# Patient Record
Sex: FEMALE | Race: WHITE | NOT HISPANIC OR LATINO | URBAN - METROPOLITAN AREA
[De-identification: names, ages, dates, MRNs, and addresses within clinical notes are randomized per-mention and may not be internally consistent; named-entity substitution may affect disease eponyms.]

---

## 2017-10-01 ENCOUNTER — INPATIENT (INPATIENT)
Facility: HOSPITAL | Age: 82
LOS: 10 days | Discharge: NURSING, REHAB OR LONG-TERM CARE | DRG: 291 | End: 2017-10-12
Attending: INTERNAL MEDICINE | Admitting: INTERNAL MEDICINE
Payer: MEDICARE

## 2017-10-01 VITALS
DIASTOLIC BLOOD PRESSURE: 69 MMHG | WEIGHT: 100.09 LBS | RESPIRATION RATE: 23 BRPM | SYSTOLIC BLOOD PRESSURE: 166 MMHG | HEIGHT: 60 IN | HEART RATE: 58 BPM | TEMPERATURE: 98 F | OXYGEN SATURATION: 95 %

## 2017-10-01 DIAGNOSIS — J90 PLEURAL EFFUSION, NOT ELSEWHERE CLASSIFIED: ICD-10-CM

## 2017-10-01 DIAGNOSIS — K21.9 GASTRO-ESOPHAGEAL REFLUX DISEASE WITHOUT ESOPHAGITIS: ICD-10-CM

## 2017-10-01 DIAGNOSIS — I35.0 NONRHEUMATIC AORTIC (VALVE) STENOSIS: ICD-10-CM

## 2017-10-01 DIAGNOSIS — F03.91 UNSPECIFIED DEMENTIA WITH BEHAVIORAL DISTURBANCE: ICD-10-CM

## 2017-10-01 DIAGNOSIS — E03.9 HYPOTHYROIDISM, UNSPECIFIED: ICD-10-CM

## 2017-10-01 DIAGNOSIS — I25.10 ATHEROSCLEROTIC HEART DISEASE OF NATIVE CORONARY ARTERY WITHOUT ANGINA PECTORIS: ICD-10-CM

## 2017-10-01 DIAGNOSIS — R06.00 DYSPNEA, UNSPECIFIED: ICD-10-CM

## 2017-10-01 DIAGNOSIS — J44.9 CHRONIC OBSTRUCTIVE PULMONARY DISEASE, UNSPECIFIED: ICD-10-CM

## 2017-10-01 DIAGNOSIS — S72.002A FRACTURE OF UNSPECIFIED PART OF NECK OF LEFT FEMUR, INITIAL ENCOUNTER FOR CLOSED FRACTURE: Chronic | ICD-10-CM

## 2017-10-01 DIAGNOSIS — I50.9 HEART FAILURE, UNSPECIFIED: ICD-10-CM

## 2017-10-01 DIAGNOSIS — R13.10 DYSPHAGIA, UNSPECIFIED: ICD-10-CM

## 2017-10-01 LAB
ALBUMIN SERPL ELPH-MCNC: 3.4 G/DL — SIGNIFICANT CHANGE UP (ref 3.3–5)
ALP SERPL-CCNC: 89 U/L — SIGNIFICANT CHANGE UP (ref 30–120)
ALT FLD-CCNC: 25 U/L DA — SIGNIFICANT CHANGE UP (ref 10–60)
ANION GAP SERPL CALC-SCNC: 10 MMOL/L — SIGNIFICANT CHANGE UP (ref 5–17)
APPEARANCE UR: CLEAR — SIGNIFICANT CHANGE UP
APTT BLD: 31 SEC — SIGNIFICANT CHANGE UP (ref 27.5–37.4)
AST SERPL-CCNC: 40 U/L — SIGNIFICANT CHANGE UP (ref 10–40)
BACTERIA # UR AUTO: ABNORMAL
BILIRUB SERPL-MCNC: 1.4 MG/DL — HIGH (ref 0.2–1.2)
BILIRUB UR-MCNC: NEGATIVE — SIGNIFICANT CHANGE UP
BUN SERPL-MCNC: 26 MG/DL — HIGH (ref 7–23)
CALCIUM SERPL-MCNC: 8.8 MG/DL — SIGNIFICANT CHANGE UP (ref 8.4–10.5)
CHLORIDE SERPL-SCNC: 105 MMOL/L — SIGNIFICANT CHANGE UP (ref 96–108)
CK MB CFR SERPL CALC: 3.8 NG/ML — HIGH (ref 0–3.6)
CO2 SERPL-SCNC: 24 MMOL/L — SIGNIFICANT CHANGE UP (ref 22–31)
COLOR SPEC: YELLOW — SIGNIFICANT CHANGE UP
CREAT SERPL-MCNC: 1.33 MG/DL — HIGH (ref 0.5–1.3)
DIFF PNL FLD: ABNORMAL
EOSINOPHIL NFR BLD AUTO: 1 % — SIGNIFICANT CHANGE UP (ref 0–6)
EPI CELLS # UR: SIGNIFICANT CHANGE UP
GIANT PLATELETS BLD QL SMEAR: PRESENT — SIGNIFICANT CHANGE UP
GLUCOSE SERPL-MCNC: 94 MG/DL — SIGNIFICANT CHANGE UP (ref 70–99)
GLUCOSE UR QL: NEGATIVE MG/DL — SIGNIFICANT CHANGE UP
HCT VFR BLD CALC: 40.5 % — SIGNIFICANT CHANGE UP (ref 34.5–45)
HGB BLD-MCNC: 12.6 G/DL — SIGNIFICANT CHANGE UP (ref 11.5–15.5)
INR BLD: 1.01 RATIO — SIGNIFICANT CHANGE UP (ref 0.88–1.16)
KETONES UR-MCNC: NEGATIVE — SIGNIFICANT CHANGE UP
LACTATE SERPL-SCNC: 2 MMOL/L — SIGNIFICANT CHANGE UP (ref 0.7–2)
LEUKOCYTE ESTERASE UR-ACNC: ABNORMAL
LYMPHOCYTES # BLD AUTO: 37 % — SIGNIFICANT CHANGE UP (ref 13–44)
MCHC RBC-ENTMCNC: 29.1 PG — SIGNIFICANT CHANGE UP (ref 27–34)
MCHC RBC-ENTMCNC: 31.1 GM/DL — LOW (ref 32–36)
MCV RBC AUTO: 93.4 FL — SIGNIFICANT CHANGE UP (ref 80–100)
MONOCYTES NFR BLD AUTO: 7 % — SIGNIFICANT CHANGE UP (ref 2–14)
NEUTROPHILS NFR BLD AUTO: 47 % — SIGNIFICANT CHANGE UP (ref 43–77)
NEUTS BAND # BLD: 5 % — SIGNIFICANT CHANGE UP (ref 0–8)
NITRITE UR-MCNC: NEGATIVE — SIGNIFICANT CHANGE UP
NT-PROBNP SERPL-SCNC: HIGH PG/ML (ref 0–450)
PH UR: 7 — SIGNIFICANT CHANGE UP (ref 5–8)
PLAT MORPH BLD: NORMAL — SIGNIFICANT CHANGE UP
PLATELET # BLD AUTO: 122 K/UL — LOW (ref 150–400)
POTASSIUM SERPL-MCNC: 5 MMOL/L — SIGNIFICANT CHANGE UP (ref 3.5–5.3)
POTASSIUM SERPL-SCNC: 5 MMOL/L — SIGNIFICANT CHANGE UP (ref 3.5–5.3)
PROT SERPL-MCNC: 6 G/DL — SIGNIFICANT CHANGE UP (ref 6–8.3)
PROT UR-MCNC: 30 MG/DL
PROTHROM AB SERPL-ACNC: 11 SEC — SIGNIFICANT CHANGE UP (ref 9.8–12.7)
RBC # BLD: 4.34 M/UL — SIGNIFICANT CHANGE UP (ref 3.8–5.2)
RBC # FLD: 15 % — HIGH (ref 10.3–14.5)
RBC BLD AUTO: NORMAL — SIGNIFICANT CHANGE UP
RBC CASTS # UR COMP ASSIST: ABNORMAL /HPF (ref 0–4)
SODIUM SERPL-SCNC: 139 MMOL/L — SIGNIFICANT CHANGE UP (ref 135–145)
SP GR SPEC: 1.01 — SIGNIFICANT CHANGE UP (ref 1.01–1.02)
TROPONIN I SERPL-MCNC: 0.04 NG/ML — SIGNIFICANT CHANGE UP (ref 0.02–0.06)
TSH SERPL-MCNC: 10.12 UIU/ML — HIGH (ref 0.27–4.2)
UROBILINOGEN FLD QL: NEGATIVE MG/DL — SIGNIFICANT CHANGE UP
VARIANT LYMPHS # BLD: 3 % — SIGNIFICANT CHANGE UP (ref 0–6)
WBC # BLD: 13 K/UL — HIGH (ref 3.8–10.5)
WBC # FLD AUTO: 13 K/UL — HIGH (ref 3.8–10.5)
WBC UR QL: ABNORMAL

## 2017-10-01 PROCEDURE — 99285 EMERGENCY DEPT VISIT HI MDM: CPT

## 2017-10-01 PROCEDURE — 71010: CPT | Mod: 26

## 2017-10-01 PROCEDURE — 76775 US EXAM ABDO BACK WALL LIM: CPT | Mod: 26

## 2017-10-01 PROCEDURE — 93010 ELECTROCARDIOGRAM REPORT: CPT

## 2017-10-01 RX ORDER — ALBUTEROL 90 UG/1
2 AEROSOL, METERED ORAL EVERY 6 HOURS
Qty: 0 | Refills: 0 | Status: DISCONTINUED | OUTPATIENT
Start: 2017-10-01 | End: 2017-10-03

## 2017-10-01 RX ORDER — LEVOTHYROXINE SODIUM 125 MCG
75 TABLET ORAL DAILY
Qty: 0 | Refills: 0 | Status: DISCONTINUED | OUTPATIENT
Start: 2017-10-01 | End: 2017-10-10

## 2017-10-01 RX ORDER — TIOTROPIUM BROMIDE 18 UG/1
1 CAPSULE ORAL; RESPIRATORY (INHALATION) DAILY
Qty: 0 | Refills: 0 | Status: DISCONTINUED | OUTPATIENT
Start: 2017-10-01 | End: 2017-10-03

## 2017-10-01 RX ORDER — FUROSEMIDE 40 MG
40 TABLET ORAL DAILY
Qty: 0 | Refills: 0 | Status: DISCONTINUED | OUTPATIENT
Start: 2017-10-02 | End: 2017-10-03

## 2017-10-01 RX ORDER — FLUTICASONE PROPIONATE 50 MCG
1 SPRAY, SUSPENSION NASAL DAILY
Qty: 0 | Refills: 0 | Status: DISCONTINUED | OUTPATIENT
Start: 2017-10-01 | End: 2017-10-12

## 2017-10-01 RX ORDER — FUROSEMIDE 40 MG
20 TABLET ORAL DAILY
Qty: 0 | Refills: 0 | Status: DISCONTINUED | OUTPATIENT
Start: 2017-10-01 | End: 2017-10-01

## 2017-10-01 RX ORDER — POTASSIUM CHLORIDE 20 MEQ
10 PACKET (EA) ORAL DAILY
Qty: 0 | Refills: 0 | Status: DISCONTINUED | OUTPATIENT
Start: 2017-10-01 | End: 2017-10-10

## 2017-10-01 RX ORDER — CARVEDILOL PHOSPHATE 80 MG/1
3.12 CAPSULE, EXTENDED RELEASE ORAL EVERY 12 HOURS
Qty: 0 | Refills: 0 | Status: DISCONTINUED | OUTPATIENT
Start: 2017-10-01 | End: 2017-10-12

## 2017-10-01 RX ORDER — ISOSORBIDE DINITRATE 5 MG/1
30 TABLET ORAL DAILY
Qty: 0 | Refills: 0 | Status: DISCONTINUED | OUTPATIENT
Start: 2017-10-01 | End: 2017-10-12

## 2017-10-01 RX ORDER — BUPROPION HYDROCHLORIDE 150 MG/1
150 TABLET, EXTENDED RELEASE ORAL DAILY
Qty: 0 | Refills: 0 | Status: DISCONTINUED | OUTPATIENT
Start: 2017-10-01 | End: 2017-10-12

## 2017-10-01 RX ORDER — IPRATROPIUM/ALBUTEROL SULFATE 18-103MCG
3 AEROSOL WITH ADAPTER (GRAM) INHALATION ONCE
Qty: 0 | Refills: 0 | Status: COMPLETED | OUTPATIENT
Start: 2017-10-01 | End: 2017-10-01

## 2017-10-01 RX ORDER — CARBIDOPA AND LEVODOPA 25; 100 MG/1; MG/1
1 TABLET ORAL DAILY
Qty: 0 | Refills: 0 | Status: DISCONTINUED | OUTPATIENT
Start: 2017-10-01 | End: 2017-10-12

## 2017-10-01 RX ORDER — PANTOPRAZOLE SODIUM 20 MG/1
40 TABLET, DELAYED RELEASE ORAL
Qty: 0 | Refills: 0 | Status: DISCONTINUED | OUTPATIENT
Start: 2017-10-01 | End: 2017-10-12

## 2017-10-01 RX ORDER — BUDESONIDE AND FORMOTEROL FUMARATE DIHYDRATE 160; 4.5 UG/1; UG/1
2 AEROSOL RESPIRATORY (INHALATION)
Qty: 0 | Refills: 0 | Status: DISCONTINUED | OUTPATIENT
Start: 2017-10-01 | End: 2017-10-03

## 2017-10-01 RX ORDER — INFLUENZA VIRUS VACCINE 15; 15; 15; 15 UG/.5ML; UG/.5ML; UG/.5ML; UG/.5ML
0.5 SUSPENSION INTRAMUSCULAR ONCE
Qty: 0 | Refills: 0 | Status: COMPLETED | OUTPATIENT
Start: 2017-10-01 | End: 2017-10-09

## 2017-10-01 RX ORDER — FUROSEMIDE 40 MG
20 TABLET ORAL ONCE
Qty: 0 | Refills: 0 | Status: COMPLETED | OUTPATIENT
Start: 2017-10-01 | End: 2017-10-01

## 2017-10-01 RX ADMIN — Medication 10 MILLIEQUIVALENT(S): at 12:51

## 2017-10-01 RX ADMIN — TIOTROPIUM BROMIDE 1 CAPSULE(S): 18 CAPSULE ORAL; RESPIRATORY (INHALATION) at 18:22

## 2017-10-01 RX ADMIN — PANTOPRAZOLE SODIUM 40 MILLIGRAM(S): 20 TABLET, DELAYED RELEASE ORAL at 10:51

## 2017-10-01 RX ADMIN — Medication 1 TABLET(S): at 12:27

## 2017-10-01 RX ADMIN — Medication 1 SPRAY(S): at 12:48

## 2017-10-01 RX ADMIN — Medication 75 MICROGRAM(S): at 12:26

## 2017-10-01 RX ADMIN — BUPROPION HYDROCHLORIDE 150 MILLIGRAM(S): 150 TABLET, EXTENDED RELEASE ORAL at 12:25

## 2017-10-01 RX ADMIN — Medication 20 MILLIGRAM(S): at 10:08

## 2017-10-01 RX ADMIN — CARVEDILOL PHOSPHATE 3.12 MILLIGRAM(S): 80 CAPSULE, EXTENDED RELEASE ORAL at 18:22

## 2017-10-01 RX ADMIN — ISOSORBIDE DINITRATE 30 MILLIGRAM(S): 5 TABLET ORAL at 12:29

## 2017-10-01 RX ADMIN — Medication 3 MILLILITER(S): at 08:39

## 2017-10-01 RX ADMIN — BUDESONIDE AND FORMOTEROL FUMARATE DIHYDRATE 2 PUFF(S): 160; 4.5 AEROSOL RESPIRATORY (INHALATION) at 18:59

## 2017-10-01 RX ADMIN — Medication 10 MILLIGRAM(S): at 10:54

## 2017-10-01 RX ADMIN — Medication 20 MILLIGRAM(S): at 10:55

## 2017-10-01 RX ADMIN — ALBUTEROL 2 PUFF(S): 90 AEROSOL, METERED ORAL at 18:59

## 2017-10-01 RX ADMIN — CARBIDOPA AND LEVODOPA 1 TABLET(S): 25; 100 TABLET ORAL at 12:26

## 2017-10-01 NOTE — ED ADULT NURSE REASSESSMENT NOTE - NS ED NURSE REASSESS COMMENT FT1
pt noted to have a bandage wrapped around bottom of left foot bandage taken down to reveal area approx the size of a half dollar area was raised and hard to palpation. no open wound seen. no drainage or swelling in or around the area.  Pts son Hiren La a podiatrist [place] are re bandaged area. He states that the area is a hard large calcification on the bottom of pts left foot. He requested that staff do not remove the bandage and the bandage should not get wet.  This information was passed on to the RN Lizzie on 1 east.

## 2017-10-01 NOTE — H&P ADULT - ASSESSMENT
89 yr old with PMH of Dementia,depression,severe aortic stenosis, chf, CVA- haemorruagic stroke in 2005,severe GI bleed 4 yrs back,pleural effusion, gait disbility following CVA,possible parkinsonafter CVa,hypothyroid, lactose intolerant,allergic to penicillin and contrast dye,recently treated with levofloxacin for possible lung infection, sent from assisted living for SOB, as per son pt does have intermittent episodes of SOB, and is usually better if stays upright, and possible also chokes on food, loosing weight , takes puree diet , no fever, no loss of appetite, ambulates with walker , no bowel complaint,    history obtained from SON who is a podiatry  Pt has living will is full code at  present  admitted with SOB has elevated pro BNP, with b/l pleural effusion, most likely acute exacerbation of CHF with severe aortic stenosis,h/o CVA, frailitity, ambulatory disabilty , with possible dysphagia, arf,

## 2017-10-01 NOTE — CONSULT NOTE ADULT - ASSESSMENT
89 year old female, history of chronic pleural effusions, Aortic Stenosis, CAD, depression, came in with SOB.  Given IV Lasix with improvement.  Now comfortable.

## 2017-10-01 NOTE — ED ADULT NURSE NOTE - OBJECTIVE STATEMENT
89 year old female BIBA from [facility] living with the chief complaint of difficulty breathing [id number] started last night . pt arrives in no acute distress   respirations even and non labored   oxygen saturation 94% on room air.

## 2017-10-01 NOTE — H&P ADULT - HISTORY OF PRESENT ILLNESS
89 yr old with PMH of Dementia,depression,severe aortic stenosis, chf, CVA- haemorruagic stroke in 2005,severe GI bleed 4 yrs back,pleural effusion, gait disbility following CVA,possible parkinsonafter CVa,hypothyroid, lactose intolerant,allergic to penicillin and contrast dye,recently treated with levofloxacin for possible lung infection, sent from assisted living for SOB, as per son pt does have intermittent episodes of SOB, and is usually better if stays upright, and possible also chokes on food, loosing weight , takes puree diet , no fever, no loss of appetite, ambulates with walker , no bowel complaint,    history obtained from SON who is a podiatry  Pt has living will is full code at  present

## 2017-10-01 NOTE — H&P ADULT - PMH
Anxiety    COPD (chronic obstructive pulmonary disease)    Coronary artery disease    Dementia with behavioral disturbance, unspecified dementia type    Gastrointestinal hemorrhage, unspecified gastrointestinal hemorrhage type    GERD (gastroesophageal reflux disease)    Hypothyroidism    Nontraumatic intracerebral hemorrhage, unspecified cerebral location, unspecified laterality    Parkinson's disease    Rhinitis

## 2017-10-01 NOTE — CONSULT NOTE ADULT - SUBJECTIVE AND OBJECTIVE BOX
PULMONARY/CRITICAL CARE        Patient is a 89y old  Female who presents with a chief complaint of acute sob, resolved with lasix. CXR showed bilat effusions, left greater than right. Denies CP.  BRIEF HOSPITAL COURSE: ***    Events last 24 hours: ***    PAST MEDICAL & SURGICAL HISTORY:  Dementia with behavioral disturbance, unspecified dementia type  Gastrointestinal hemorrhage, unspecified gastrointestinal hemorrhage type  Nontraumatic intracerebral hemorrhage, unspecified cerebral location, unspecified laterality  Rhinitis  Anxiety  GERD (gastroesophageal reflux disease)  Hypothyroidism  Coronary artery disease  COPD (chronic obstructive pulmonary disease)  Parkinson's disease  Aortic stenosis    Allergies    IV Contrast (Unknown)  penicillin (Unknown)    Intolerances      FAMILY HISTORY and SOCIAL HX: No cigs, etoh. lives in [facility].      Review of Systems:  CONSTITUTIONAL: No fever, chills, or fatigue  EYES: No eye pain, visual disturbances, or discharge  ENMT:  No difficulty hearing, tinnitus, vertigo; No sinus or throat pain  NECK: No pain or stiffness  RESPIRATORY: No cough, wheezing, chills or hemoptysis; Sob shortness of breath  CARDIOVASCULAR: No chest pain, palpitations, dizziness, or leg swelling  GASTROINTESTINAL: No abdominal or epigastric pain. No nausea, vomiting, or hematemesis; No diarrhea or constipation. No melena or hematochezia.  GENITOURINARY: No dysuria, frequency, hematuria, or incontinence  NEUROLOGICAL: No headaches, memory loss, loss of strength, numbness, or tremors  SKIN: No itching, burning, rashes, or lesions   MUSCULOSKELETAL: No joint pain or swelling; No muscle, back, or extremity pain  PSYCHIATRIC: No depression, anxiety, mood swings, or difficulty sleeping      Medications:    enalapril 10 milliGRAM(s) Oral daily  isosorbide   dinitrate Tablet (ISORDIL) 30 milliGRAM(s) Oral daily  carvedilol 3.125 milliGRAM(s) Oral every 12 hours  furosemide   Injectable 20 milliGRAM(s) IV Push daily    ALBUTerol    90 MICROgram(s) HFA Inhaler 2 Puff(s) Inhalation every 6 hours  tiotropium 18 MICROgram(s) Capsule 1 Capsule(s) Inhalation daily  buDESOnide 160 MICROgram(s)/formoterol 4.5 MICROgram(s) Inhaler 2 Puff(s) Inhalation two times a day    carbidopa/levodopa  25/100 1 Tablet(s) Oral daily  buPROPion XL . 150 milliGRAM(s) Oral daily        pantoprazole    Tablet 40 milliGRAM(s) Oral before breakfast      levothyroxine 75 MICROGram(s) Oral daily    potassium chloride    Tablet ER 10 milliEquivalent(s) Oral daily  multivitamin 1 Tablet(s) Oral daily      fluticasone propionate 50 MICROgram(s)/spray Nasal Spray 1 Spray(s) Both Nostrils daily            ICU Vital Signs Last 24 Hrs  T(C): 36.3 (01 Oct 2017 08:56), Max: 36.4 (01 Oct 2017 07:44)  T(F): 97.3 (01 Oct 2017 08:56), Max: 97.5 (01 Oct 2017 07:44)  HR: 60 (01 Oct 2017 08:56) (55 - 60)  BP: 184/70 (01 Oct 2017 08:56) (166/69 - 184/70)  BP(mean): --  ABP: --  ABP(mean): --  RR: 20 (01 Oct 2017 08:56) (20 - 23)  SpO2: 97% (01 Oct 2017 08:56) (95% - 97%)    Vital Signs Last 24 Hrs  T(C): 36.3 (01 Oct 2017 08:56), Max: 36.4 (01 Oct 2017 07:44)  T(F): 97.3 (01 Oct 2017 08:56), Max: 97.5 (01 Oct 2017 07:44)  HR: 60 (01 Oct 2017 08:56) (55 - 60)  BP: 184/70 (01 Oct 2017 08:56) (166/69 - 184/70)  BP(mean): --  RR: 20 (01 Oct 2017 08:56) (20 - 23)  SpO2: 97% (01 Oct 2017 08:56) (95% - 97%)        I&O's Detail        LABS:                        12.6   13.0  )-----------( 122      ( 01 Oct 2017 08:30 )             40.5     10-    139  |  105  |  26<H>  ----------------------------<  94  5.0   |  24  |  1.33<H>    Ca    8.8      01 Oct 2017 08:30    TPro  6.0  /  Alb  3.4  /  TBili  1.4<H>  /  DBili  x   /  AST  40  /  ALT  25  /  AlkPhos  89  10-01      CARDIAC MARKERS ( 01 Oct 2017 08:30 )  .045 ng/mL / x     / x     / x     / 3.8 ng/mL      CAPILLARY BLOOD GLUCOSE        PT/INR - ( 01 Oct 2017 08:30 )   PT: 11.0 sec;   INR: 1.01 ratio         PTT - ( 01 Oct 2017 08:30 )  PTT:31.0 sec  Urinalysis Basic - ( 01 Oct 2017 09:22 )    Color: Yellow / Appearance: Clear / S.010 / pH: x  Gluc: x / Ketone: Negative  / Bili: Negative / Urobili: Negative mg/dL   Blood: x / Protein: 30 mg/dL / Nitrite: Negative   Leuk Esterase: Small / RBC: 6-10 /HPF / WBC 6-10   Sq Epi: x / Non Sq Epi: Few / Bacteria: Few      CULTURES:      Physical Examination:    General: No acute distress.      HEENT: Pupils equal, reactive to light.  Symmetric.    PULM: Crackles right base, decreased bs, dull to percuss 1/3 left, right base.    CVS: Regular rate and rhythm, 1/6 sys murmur base, has JVD, no rubs, or gallops    ABD: Soft, nondistended, nontender, normoactive bowel sounds, no masses    EXT: 1 plus edema, nontender    SKIN: Warm and well perfused, no rashes noted.    NEURO: Alert, oriented, interactive, nonfocal    RADIOLOGY: ***    CRITICAL CARE TIME SPENT: ***

## 2017-10-01 NOTE — PHYSICAL THERAPY INITIAL EVALUATION ADULT - PERTINENT HX OF CURRENT PROBLEM, REHAB EVAL
weakness, SOB:sent from assisted living for SOB, as per son pt does have intermittent episodes of SOB, and is usually better if stays upright, and possible also chokes on food, loosing weight

## 2017-10-01 NOTE — PATIENT PROFILE ADULT. - PMH
Anxiety    CLL (chronic lymphocytic leukemia)    COPD (chronic obstructive pulmonary disease)    Coronary artery disease    Dementia with behavioral disturbance, unspecified dementia type    Gastrointestinal hemorrhage, unspecified gastrointestinal hemorrhage type    GERD (gastroesophageal reflux disease)    Hypothyroidism    Nontraumatic intracerebral hemorrhage, unspecified cerebral location, unspecified laterality    Rhinitis

## 2017-10-01 NOTE — ED PROVIDER NOTE - MEDICAL DECISION MAKING DETAILS
89 yo female from [facility] with resp distress today. PE unrevealing. Plan - cxr, ekg, labs, duoneb. COPD vs CHF vs Pneumonia.

## 2017-10-01 NOTE — PHYSICAL THERAPY INITIAL EVALUATION ADULT - CRITERIA FOR SKILLED THERAPEUTIC INTERVENTIONS
anticipated equipment needs at discharge/d/c home with HCPT, pt has RW/anticipated discharge recommendation/impairments found

## 2017-10-01 NOTE — ED PROVIDER NOTE - PMH
Anxiety    COPD (chronic obstructive pulmonary disease)    Coronary artery disease    GERD (gastroesophageal reflux disease)    Hypothyroidism    Parkinson's disease    Rhinitis

## 2017-10-01 NOTE — CONSULT NOTE ADULT - SUBJECTIVE AND OBJECTIVE BOX
CARDIOLOGY CONSULT NOTE    Patient is a 89y Female with a known history of :  Coronary artery disease (I25.10)  Hypothyroidism (E03.9)  Pleural effusion (J90)  Dysphagia, unspecified type (R13.10)  Gastroesophageal reflux disease without esophagitis (K21.9)  Hypothyroidism, unspecified type (E03.9)  Chronic obstructive pulmonary disease, unspecified COPD type (J44.9)  Dementia with behavioral disturbance, unspecified dementia type (F03.91)  Congestive heart failure, unspecified congestive heart failure chronicity, unspecified congestive heart failure type (I50.9)    HPI:  89 yr old with PMH of Dementia,depression,severe aortic stenosis, chf, CVA- haemorruagic stroke in 2005,severe GI bleed 4 yrs back,pleural effusion, gait disbility following CVA,possible parkinsonafter CVa,hypothyroid, lactose intolerant,allergic to penicillin and contrast dye,recently treated with levofloxacin for possible lung infection, sent from assisted living for SOB, as per son pt does have intermittent episodes of SOB, and is usually better if stays upright, and possible also chokes on food, loosing weight , takes puree diet , no fever, no loss of appetite, ambulates with walker , no bowel complaint,    history obtained from SON who is a podiatry  Pt has living will is full code at  present (01 Oct 2017 10:07)    In ER given IV Lasix with resolution of SOB.      REVIEW OF SYSTEMS:    CONSTITUTIONAL: Sitting up in bed eating breakfast.  EYES: No eye pain, visual disturbances, or discharge  ENMT:  No difficulty hearing, tinnitus, vertigo; No sinus or throat pain  NECK: No pain or stiffness    RESPIRATORY:  Periodic episodes of SOB  CARDIOVASCULAR: No chest pain, palpitations, dizziness, or leg swelling  GASTROINTESTINAL: No abdominal or epigastric pain. No nausea, vomiting, or hematemesis; No diarrhea or constipation. No melena or hematochezia.    NEUROLOGICAL: No headaches, memory loss, loss of strength, numbness, or dougie      MUSCULOSKELETAL: No joint pain or swelling; No muscle, back, or extremity pain  PSYCHIATRIC: very pleasant, answers appropriately    ALLERGY AND IMMUNOLOGIC: No hives or eczema    MEDICATIONS  (STANDING):  enalapril 10 milliGRAM(s) Oral daily  isosorbide   dinitrate Tablet (ISORDIL) 30 milliGRAM(s) Oral daily  carbidopa/levodopa  25/100 1 Tablet(s) Oral daily  carvedilol 3.125 milliGRAM(s) Oral every 12 hours  ALBUTerol    90 MICROgram(s) HFA Inhaler 2 Puff(s) Inhalation every 6 hours  tiotropium 18 MICROgram(s) Capsule 1 Capsule(s) Inhalation daily  buDESOnide 160 MICROgram(s)/formoterol 4.5 MICROgram(s) Inhaler 2 Puff(s) Inhalation two times a day  furosemide   Injectable 20 milliGRAM(s) IV Push daily  potassium chloride    Tablet ER 10 milliEquivalent(s) Oral daily  fluticasone propionate 50 MICROgram(s)/spray Nasal Spray 1 Spray(s) Both Nostrils daily  pantoprazole    Tablet 40 milliGRAM(s) Oral before breakfast  buPROPion XL . 150 milliGRAM(s) Oral daily  levothyroxine 75 MICROGram(s) Oral daily  multivitamin 1 Tablet(s) Oral daily    MEDICATIONS  (PRN):      ALLERGIES: IV Contrast (Unknown)  penicillin (Unknown)      FAMILY HISTORY:      Social History:  Alochol:   Smoking:   Drug Use:   Marital Status:     PHYSICAL EXAMINATION:  -----------------------------  T(C): 36.3 (10-01-17 @ 08:56), Max: 36.4 (10-01-17 @ 07:44)  HR: 60 (10-01-17 @ 08:56) (55 - 60)  BP: 184/70 (10-01-17 @ 08:56) (166/69 - 184/70)  RR: 20 (10-01-17 @ 08:56) (20 - 23)  SpO2: 97% (10-01-17 @ 08:56) (95% - 97%)  Wt(kg): --    Height (cm): 152.4 (10-01 @ 07:44)  Weight (kg): 45.4 (10-01 @ 07:44)  BMI (kg/m2): 19.5 (10-01 @ 07:44)  BSA (m2): 1.39 (10-01 @ 07:44)    Constitutional: well developed, normal appearance, well groomed, well nourished, no deformities and no acute distress.   Eyes: the conjunctiva exhibited no abnormalities and the eyelids demonstrated no xanthelasmas.   HEENT: normal oral mucosa, no oral pallor and no oral cyanosis.   Neck: no JVD, carotid upstroke minimal delay   Pulmonary: good air flow both sides.  Difficult to evaluate as patient eating  Cardiovascular: heart rate and rhythm were normal, normal S1 and S2 good.  I/VI systolic blow Aorta  Musculoskeletal: the gait could not be assessed..   Extremities: no clubbing of the fingernails, no localized cyanosis, no petechial hemorrhages and no ischemic changes.   Skin: normal skin color and pigmentation, no rash, no venous stasis, no skin lesions, no skin ulcer and no xanthoma was observed.   Psychiatric: pleasant disposition.    LABS:   --------    CBC Full  -  ( 01 Oct 2017 08:30 )  WBC Count : 13.0 K/uL  Hemoglobin : 12.6 g/dL  Hematocrit : 40.5 %  Platelet Count - Automated : 122 K/uL  Mean Cell Volume : 93.4 fl  Mean Cell Hemoglobin : 29.1 pg  Mean Cell Hemoglobin Concentration : 31.1 gm/dL  Auto Neutrophil # : x  Auto Lymphocyte # : x  Auto Monocyte # : x  Auto Eosinophil # : x  Auto Basophil # : x  Auto Neutrophil % : x  Auto Lymphocyte % : x  Auto Monocyte % : x  Auto Eosinophil % : x  Auto Basophil % : x        10-01    139  |  105  |  26<H>  ----------------------------<  94  5.0   |  24  |  1.33<H>    Ca    8.8      01 Oct 2017 08:30    TPro  6.0  /  Alb  3.4  /  TBili  1.4<H>  /  DBili  x   /  AST  40  /  ALT  25  /  AlkPhos  89  10-01      CARDIAC MARKERS ( 01 Oct 2017 08:30 )  .045 ng/mL / x     / x     / x     / 3.8 ng/mL         PT/INR - ( 01 Oct 2017 08:30 )   PT: 11.0 sec;   INR: 1.01 ratio         PTT - ( 01 Oct 2017 08:30 )  PTT:31.0 sec  10-01 @ 08:30 BNP: 18136 pg/mL    10-01 @ 08:30 CPK total:--, CKMB --, Troponin I - .045 ng/mL            RADIOLOGY:  -----------------      ECG:     ECHO: CARDIOLOGY CONSULT NOTE    Patient is a 89y Female with a known history of :  Coronary artery disease (I25.10)  Hypothyroidism (E03.9)  Pleural effusion (J90)  Dysphagia, unspecified type (R13.10)  Gastroesophageal reflux disease without esophagitis (K21.9)  Hypothyroidism, unspecified type (E03.9)  Chronic obstructive pulmonary disease, unspecified COPD type (J44.9)  Dementia with behavioral disturbance, unspecified dementia type (F03.91)  Congestive heart failure, unspecified congestive heart failure chronicity, unspecified congestive heart failure type (I50.9)    HPI:  89 yr old with PMH of Dementia,depression,severe aortic stenosis, chf, CVA- haemorruagic stroke in 2005,severe GI bleed 4 yrs back,pleural effusion, gait disbility following CVA,possible parkinsonafter CVa,hypothyroid, lactose intolerant,allergic to penicillin and contrast dye,recently treated with levofloxacin for possible lung infection, sent from assisted living for SOB, as per son pt does have intermittent episodes of SOB, and is usually better if stays upright, and possible also chokes on food, loosing weight , takes puree diet , no fever, no loss of appetite, ambulates with walker , no bowel complaint,    history obtained from SON who is a podiatry  Pt has living will is full code at  present (01 Oct 2017 10:07)    In ER given IV Lasix with resolution of SOB.      REVIEW OF SYSTEMS:    CONSTITUTIONAL: Sitting up in bed eating breakfast.  EYES: No eye pain, visual disturbances, or discharge  ENMT:  No difficulty hearing, tinnitus, vertigo; No sinus or throat pain  NECK: No pain or stiffness    RESPIRATORY:  Periodic episodes of SOB  CARDIOVASCULAR: No chest pain, palpitations, dizziness, or leg swelling  GASTROINTESTINAL: No abdominal or epigastric pain. No nausea, vomiting, or hematemesis; No diarrhea or constipation. No melena or hematochezia.    NEUROLOGICAL: No headaches, memory loss, loss of strength, numbness, or dougie      MUSCULOSKELETAL: No joint pain or swelling; No muscle, back, or extremity pain  PSYCHIATRIC: very pleasant, answers appropriately    ALLERGY AND IMMUNOLOGIC: No hives or eczema    MEDICATIONS  (STANDING):  enalapril 10 milliGRAM(s) Oral daily  isosorbide   dinitrate Tablet (ISORDIL) 30 milliGRAM(s) Oral daily  carbidopa/levodopa  25/100 1 Tablet(s) Oral daily  carvedilol 3.125 milliGRAM(s) Oral every 12 hours  ALBUTerol    90 MICROgram(s) HFA Inhaler 2 Puff(s) Inhalation every 6 hours  tiotropium 18 MICROgram(s) Capsule 1 Capsule(s) Inhalation daily  buDESOnide 160 MICROgram(s)/formoterol 4.5 MICROgram(s) Inhaler 2 Puff(s) Inhalation two times a day  furosemide   Injectable 20 milliGRAM(s) IV Push daily  potassium chloride    Tablet ER 10 milliEquivalent(s) Oral daily  fluticasone propionate 50 MICROgram(s)/spray Nasal Spray 1 Spray(s) Both Nostrils daily  pantoprazole    Tablet 40 milliGRAM(s) Oral before breakfast  buPROPion XL . 150 milliGRAM(s) Oral daily  levothyroxine 75 MICROGram(s) Oral daily  multivitamin 1 Tablet(s) Oral daily    MEDICATIONS  (PRN):      ALLERGIES: IV Contrast (Unknown)  penicillin (Unknown)      FAMILY HISTORY:      Social History:  Alochol:   Smoking:   Drug Use:   Marital Status:     PHYSICAL EXAMINATION:  -----------------------------  T(C): 36.3 (10-01-17 @ 08:56), Max: 36.4 (10-01-17 @ 07:44)  HR: 60 (10-01-17 @ 08:56) (55 - 60)  BP: 184/70 (10-01-17 @ 08:56) (166/69 - 184/70)  RR: 20 (10-01-17 @ 08:56) (20 - 23)  SpO2: 97% (10-01-17 @ 08:56) (95% - 97%)  Wt(kg): --    Height (cm): 152.4 (10-01 @ 07:44)  Weight (kg): 45.4 (10-01 @ 07:44)  BMI (kg/m2): 19.5 (10-01 @ 07:44)  BSA (m2): 1.39 (10-01 @ 07:44)    Constitutional: well developed, normal appearance, well groomed, well nourished, no deformities and no acute distress.   Eyes: the conjunctiva exhibited no abnormalities and the eyelids demonstrated no xanthelasmas.   HEENT: normal oral mucosa, no oral pallor and no oral cyanosis.   Neck: no JVD, carotid upstroke minimal delay   Pulmonary: good air flow both sides.  Difficult to evaluate as patient eating  Cardiovascular: heart rate and rhythm were normal, normal S1 and S2 good.  I/VI systolic blow Aorta  Musculoskeletal: the gait could not be assessed..   Extremities: no clubbing of the fingernails, no localized cyanosis, no petechial hemorrhages and no ischemic changes.   Skin: normal skin color and pigmentation, no rash, no venous stasis, no skin lesions, no skin ulcer and no xanthoma was observed.   Psychiatric: pleasant disposition.    LABS:   --------    CBC Full  -  ( 01 Oct 2017 08:30 )  WBC Count : 13.0 K/uL  Hemoglobin : 12.6 g/dL  Hematocrit : 40.5 %  Platelet Count - Automated : 122 K/uL  Mean Cell Volume : 93.4 fl  Mean Cell Hemoglobin : 29.1 pg  Mean Cell Hemoglobin Concentration : 31.1 gm/dL  Auto Neutrophil # : x  Auto Lymphocyte # : x  Auto Monocyte # : x  Auto Eosinophil # : x  Auto Basophil # : x  Auto Neutrophil % : x  Auto Lymphocyte % : x  Auto Monocyte % : x  Auto Eosinophil % : x  Auto Basophil % : x        10-01    139  |  105  |  26<H>  ----------------------------<  94  5.0   |  24  |  1.33<H>    Ca    8.8      01 Oct 2017 08:30    TPro  6.0  /  Alb  3.4  /  TBili  1.4<H>  /  DBili  x   /  AST  40  /  ALT  25  /  AlkPhos  89  10-01      CARDIAC MARKERS ( 01 Oct 2017 08:30 )  .045 ng/mL / x     / x     / x     / 3.8 ng/mL         PT/INR - ( 01 Oct 2017 08:30 )   PT: 11.0 sec;   INR: 1.01 ratio         PTT - ( 01 Oct 2017 08:30 )  PTT:31.0 sec  10-01 @ 08:30 BNP: 03251 pg/mL    10-01 @ 08:30 CPK total:--, CKMB --, Troponin I - .045 ng/mL            RADIOLOGY:  Bilat pleural effusions.  -----------------      ECG: NSR 60/min., PAC, LAHB    ECHO:  Await

## 2017-10-02 LAB
ANION GAP SERPL CALC-SCNC: 10 MMOL/L — SIGNIFICANT CHANGE UP (ref 5–17)
BUN SERPL-MCNC: 29 MG/DL — HIGH (ref 7–23)
CALCIUM SERPL-MCNC: 8.7 MG/DL — SIGNIFICANT CHANGE UP (ref 8.4–10.5)
CHLORIDE SERPL-SCNC: 105 MMOL/L — SIGNIFICANT CHANGE UP (ref 96–108)
CO2 SERPL-SCNC: 26 MMOL/L — SIGNIFICANT CHANGE UP (ref 22–31)
CREAT SERPL-MCNC: 1.41 MG/DL — HIGH (ref 0.5–1.3)
GLUCOSE SERPL-MCNC: 90 MG/DL — SIGNIFICANT CHANGE UP (ref 70–99)
HCT VFR BLD CALC: 35.1 % — SIGNIFICANT CHANGE UP (ref 34.5–45)
HGB BLD-MCNC: 11.2 G/DL — LOW (ref 11.5–15.5)
MCHC RBC-ENTMCNC: 29.6 PG — SIGNIFICANT CHANGE UP (ref 27–34)
MCHC RBC-ENTMCNC: 31.8 GM/DL — LOW (ref 32–36)
MCV RBC AUTO: 92.9 FL — SIGNIFICANT CHANGE UP (ref 80–100)
MRSA PCR RESULT.: DETECTED
PLATELET # BLD AUTO: 120 K/UL — LOW (ref 150–400)
POTASSIUM SERPL-MCNC: 4 MMOL/L — SIGNIFICANT CHANGE UP (ref 3.5–5.3)
POTASSIUM SERPL-SCNC: 4 MMOL/L — SIGNIFICANT CHANGE UP (ref 3.5–5.3)
PROCALCITONIN SERPL-MCNC: <0.05 NG/ML — SIGNIFICANT CHANGE UP (ref 0–0.04)
RBC # BLD: 3.78 M/UL — LOW (ref 3.8–5.2)
RBC # FLD: 14.1 % — SIGNIFICANT CHANGE UP (ref 10.3–14.5)
S AUREUS DNA NOSE QL NAA+PROBE: DETECTED
SODIUM SERPL-SCNC: 141 MMOL/L — SIGNIFICANT CHANGE UP (ref 135–145)
WBC # BLD: 9.4 K/UL — SIGNIFICANT CHANGE UP (ref 3.8–10.5)
WBC # FLD AUTO: 9.4 K/UL — SIGNIFICANT CHANGE UP (ref 3.8–10.5)

## 2017-10-02 PROCEDURE — 71010: CPT | Mod: 26

## 2017-10-02 PROCEDURE — 93306 TTE W/DOPPLER COMPLETE: CPT | Mod: 26

## 2017-10-02 RX ADMIN — Medication 40 MILLIGRAM(S): at 05:39

## 2017-10-02 RX ADMIN — ALBUTEROL 2 PUFF(S): 90 AEROSOL, METERED ORAL at 17:52

## 2017-10-02 RX ADMIN — TIOTROPIUM BROMIDE 1 CAPSULE(S): 18 CAPSULE ORAL; RESPIRATORY (INHALATION) at 06:31

## 2017-10-02 RX ADMIN — Medication 10 MILLIGRAM(S): at 05:38

## 2017-10-02 RX ADMIN — BUDESONIDE AND FORMOTEROL FUMARATE DIHYDRATE 2 PUFF(S): 160; 4.5 AEROSOL RESPIRATORY (INHALATION) at 17:54

## 2017-10-02 RX ADMIN — BUDESONIDE AND FORMOTEROL FUMARATE DIHYDRATE 2 PUFF(S): 160; 4.5 AEROSOL RESPIRATORY (INHALATION) at 06:31

## 2017-10-02 RX ADMIN — CARBIDOPA AND LEVODOPA 1 TABLET(S): 25; 100 TABLET ORAL at 12:02

## 2017-10-02 RX ADMIN — BUPROPION HYDROCHLORIDE 150 MILLIGRAM(S): 150 TABLET, EXTENDED RELEASE ORAL at 12:02

## 2017-10-02 RX ADMIN — ISOSORBIDE DINITRATE 30 MILLIGRAM(S): 5 TABLET ORAL at 12:02

## 2017-10-02 RX ADMIN — ALBUTEROL 2 PUFF(S): 90 AEROSOL, METERED ORAL at 08:16

## 2017-10-02 RX ADMIN — PANTOPRAZOLE SODIUM 40 MILLIGRAM(S): 20 TABLET, DELAYED RELEASE ORAL at 06:32

## 2017-10-02 RX ADMIN — Medication 10 MILLIEQUIVALENT(S): at 12:02

## 2017-10-02 RX ADMIN — Medication 1 TABLET(S): at 12:02

## 2017-10-02 RX ADMIN — CARVEDILOL PHOSPHATE 3.12 MILLIGRAM(S): 80 CAPSULE, EXTENDED RELEASE ORAL at 17:52

## 2017-10-02 RX ADMIN — CARVEDILOL PHOSPHATE 3.12 MILLIGRAM(S): 80 CAPSULE, EXTENDED RELEASE ORAL at 05:38

## 2017-10-02 RX ADMIN — Medication 75 MICROGRAM(S): at 05:37

## 2017-10-02 NOTE — SWALLOW BEDSIDE ASSESSMENT ADULT - SWALLOW EVAL: RECOMMENDED FEEDING/EATING TECHNIQUES
allow for swallow between intakes/maintain upright posture during/after eating for 30 mins/check mouth frequently for oral residue/pocketing/no straws/position upright (90 degrees)/small sips/bites/oral hygiene

## 2017-10-02 NOTE — SWALLOW BEDSIDE ASSESSMENT ADULT - COMMENTS
Pt alert, oriented x2, cooperative. Pt with PMH significant for CHF, hemorraghic CVA, dementia.  According to pt's daughter, pt consumes a puree diet with thin liquids PTA at assisted living facility.  Pt has incomplete dentition.  Pt's daughter reported that the pt's remaining teeth will soon be removed.  Pt presents with oropharyngeal dysphagia characterized by adequate oral prep, labored formation of the bolus, sufficient mastication, latent AP transport, swallow delay.  Overt s/s of aspiration noted for thin liquids.  Pt safely tolerating dysphagia 2 mechanical soft with nectar thickened liquids at this time.  When pt has her teeth removed, pt may need puree consistencies.

## 2017-10-02 NOTE — PROGRESS NOTE ADULT - SUBJECTIVE AND OBJECTIVE BOX
PULMONARY/CRITICAL CARE      INTERVAL HPI/OVERNIGHT EVENTS: Still c/o sob. No cp. Alert, confused. No fever.    89y FemaleHPI:  89 yr old with PMH of Dementia,depression,severe aortic stenosis, chf, CVA- haemorruagic stroke in 2005,severe GI bleed 4 yrs back,pleural effusion, gait disbility following CVA,possible parkinsonafter CVa,hypothyroid, lactose intolerant,allergic to penicillin and contrast dye,recently treated with levofloxacin for possible lung infection, sent from assisted living for SOB, as per son pt does have intermittent episodes of SOB, and is usually better if stays upright, and possible also chokes on food, loosing weight , takes puree diet , no fever, no loss of appetite, ambulates with walker , no bowel complaint,    history obtained from SON who is a podiatry  Pt has living will is full code at  present (01 Oct 2017 10:07)        PAST MEDICAL & SURGICAL HISTORY:  CLL (chronic lymphocytic leukemia)  Dementia with behavioral disturbance, unspecified dementia type  Gastrointestinal hemorrhage, unspecified gastrointestinal hemorrhage type  Nontraumatic intracerebral hemorrhage, unspecified cerebral location, unspecified laterality  Rhinitis  Anxiety  GERD (gastroesophageal reflux disease)  Hypothyroidism  Coronary artery disease  COPD (chronic obstructive pulmonary disease)  Closed fracture of neck of left femur, initial encounter      CXR unchanged  ICU Vital Signs Last 24 Hrs  T(C): 36.8 (02 Oct 2017 10:42), Max: 36.8 (02 Oct 2017 10:42)  T(F): 98.2 (02 Oct 2017 10:42), Max: 98.2 (02 Oct 2017 10:42)  HR: 57 (02 Oct 2017 10:42) (57 - 91)  BP: 151/80 (02 Oct 2017 10:42) (143/71 - 181/72)  BP(mean): --  ABP: --  ABP(mean): --  RR: 18 (02 Oct 2017 10:42) (18 - 22)  SpO2: 96% (02 Oct 2017 10:42) (95% - 100%)    Qtts:     I&O's Summary          REVIEW OF SYSTEMS:    CONSTITUTIONAL: No fever, weight loss, or fatigue  EYES: No eye pain, visual disturbances, or discharge  ENMT:  No difficulty hearing, tinnitus, vertigo; No sinus or throat pain  NECK: No pain or stiffness  BREASTS: No pain, masses, or nipple discharge  RESPIRATORY: No cough, wheezing, chills or hemoptysis; Mild shortness of breath  CARDIOVASCULAR: No chest pain, palpitations, dizziness, or leg swelling  GASTROINTESTINAL: No abdominal or epigastric pain. No nausea, vomiting, or hematemesis; No diarrhea or constipation. No melena or hematochezia.  GENITOURINARY: No dysuria, frequency, hematuria, or incontinence  NEUROLOGICAL: No headaches, , numbness, or tremors  SKIN: No itching, burning, rashes, or lesions   LYMPH NODES: No enlarged glands  ENDOCRINE: No heat or cold intolerance; No hair loss  MUSCULOSKELETAL: No joint pain or swelling; No muscle, back, or extremity pain, no calf tenderness  PSYCHIATRIC: No depression, anxiety,  or difficulty sleeping  HEME/LYMPH: No easy bruising, or bleeding gums  ALLERGY AND IMMUNOLOGIC: No hives or eczema      PHYSICAL EXAM:    GENERAL: NAD, well-groomed, well-developed, NAD  HEAD:  Atraumatic, Normocephalic  EYES: EOMI, PERRLA, conjunctiva and sclera clear  ENMT: No tonsillar erythema, exudates, or enlargement; Moist mucous membranes, Good dentition, No lesions  NECK: Supple, No JVD, Normal thyroid  NERVOUS SYSTEM:  Alert ; Motor Strength 5/5 B/L upper and lower extremities  CHEST/LUNG: Clear to percussion bilaterally; No rales, rhonchi, wheezing, or rubs  HEART: Regular rate and rhythm; No murmurs, rubs, or gallops  ABDOMEN: Soft, Nontender, Nondistended; Bowel sounds present  EXTREMITIES:  2+ Peripheral Pulses, No clubbing, cyanosis, or edema  LYMPH: No lymphadenopathy noted  SKIN: No rashes or lesions        LABS:                        11.2   9.4   )-----------( 120      ( 02 Oct 2017 07:05 )             35.1     10-    141  |  105  |  29<H>  ----------------------------<  90  4.0   |  26  |  1.41<H>    Ca    8.7      02 Oct 2017 07:05    TPro  6.0  /  Alb  3.4  /  TBili  1.4<H>  /  DBili  x   /  AST  40  /  ALT  25  /  AlkPhos  89  10-01    PT/INR - ( 01 Oct 2017 08:30 )   PT: 11.0 sec;   INR: 1.01 ratio         PTT - ( 01 Oct 2017 08:30 )  PTT:31.0 sec  Urinalysis Basic - ( 01 Oct 2017 09:22 )    Color: Yellow / Appearance: Clear / S.010 / pH: x  Gluc: x / Ketone: Negative  / Bili: Negative / Urobili: Negative mg/dL   Blood: x / Protein: 30 mg/dL / Nitrite: Negative   Leuk Esterase: Small / RBC: 6-10 /HPF / WBC 6-10   Sq Epi: x / Non Sq Epi: Few / Bacteria: Few        vanco through     RADIOLOGY & ADDITIONAL STUDIES:      CRITICAL CARE TIME SPENT:

## 2017-10-02 NOTE — DIETITIAN INITIAL EVALUATION ADULT. - NS AS NUTRI INTERV MEALS SNACK
Fluid - modified diet/General/healthful diet/pureed diet c nectar flds, diet consistency going forward per SLP eval, rec adding low na restriction given chf, rec adding ensure pudding tid

## 2017-10-02 NOTE — SWALLOW BEDSIDE ASSESSMENT ADULT - ASR SWALLOW ASPIRATION MONITOR
fever/pneumonia/upper respiratory infection/throat clearing/change of breathing pattern/oral hygiene/position upright (89Y)/cough/gurgly voice

## 2017-10-02 NOTE — PROGRESS NOTE ADULT - SUBJECTIVE AND OBJECTIVE BOX
Patient is a 89y Female with a known history of :  Aortic stenosis (I35.0)  Coronary artery disease (I25.10)  Hypothyroidism (E03.9)  Pleural effusion (J90)  Dysphagia, unspecified type (R13.10)  Gastroesophageal reflux disease without esophagitis (K21.9)  Hypothyroidism, unspecified type (E03.9)  Chronic obstructive pulmonary disease, unspecified COPD type (J44.9)  Dementia with behavioral disturbance, unspecified dementia type (F03.91)  Congestive heart failure, unspecified congestive heart failure chronicity, unspecified congestive heart failure type (I50.9)    HPI:  89 yr old with PMH of Dementia,depression,severe aortic stenosis, chf, CVA- haemorruagic stroke in 2005,severe GI bleed 4 yrs back,pleural effusion, gait disbility following CVA,possible parkinsonafter CVa,hypothyroid, lactose intolerant,allergic to penicillin and contrast dye,recently treated with levofloxacin for possible lung infection, sent from assisted living for SOB, as per son pt does have intermittent episodes of SOB, and is usually better if stays upright, and possible also chokes on food, loosing weight , takes puree diet , no fever, no loss of appetite, ambulates with walker , no bowel complaint,    history obtained from SON who is a podiatry  Pt has living will is full code at  present (01 Oct 2017 10:07)        MEDICATIONS  (STANDING):  enalapril 10 milliGRAM(s) Oral daily  isosorbide   dinitrate Tablet (ISORDIL) 30 milliGRAM(s) Oral daily  carbidopa/levodopa  25/100 1 Tablet(s) Oral daily  carvedilol 3.125 milliGRAM(s) Oral every 12 hours  ALBUTerol    90 MICROgram(s) HFA Inhaler 2 Puff(s) Inhalation every 6 hours  tiotropium 18 MICROgram(s) Capsule 1 Capsule(s) Inhalation daily  buDESOnide 160 MICROgram(s)/formoterol 4.5 MICROgram(s) Inhaler 2 Puff(s) Inhalation two times a day  potassium chloride    Tablet ER 10 milliEquivalent(s) Oral daily  fluticasone propionate 50 MICROgram(s)/spray Nasal Spray 1 Spray(s) Both Nostrils daily  pantoprazole    Tablet 40 milliGRAM(s) Oral before breakfast  buPROPion XL . 150 milliGRAM(s) Oral daily  levothyroxine 75 MICROGram(s) Oral daily  multivitamin 1 Tablet(s) Oral daily  furosemide   Injectable 40 milliGRAM(s) IV Push daily  influenza   Vaccine 0.5 milliLiter(s) IntraMuscular once    MEDICATIONS  (PRN):      ALLERGIES: IV Contrast (Unknown)  penicillin (Unknown)      FAMILY HISTORY:      Social history:  Alochol:   Smoking:   Drug Use:   Marital Status:     PHYSICAL EXAMINATION:  -----------------------------  T(C): 36.5 (10-02-17 @ 05:48), Max: 36.7 (10-02-17 @ 00:44)  HR: 63 (10-02-17 @ 05:48) (55 - 91)  BP: 164/77 (10-02-17 @ 05:48) (143/71 - 184/70)  RR: 18 (10-02-17 @ 05:48) (18 - 22)  SpO2: 100% (10-02-17 @ 05:48) (95% - 100%)  Wt(kg): --        Constitutional: in bed, confused  Eyes: the conjunctiva exhibited no abnormalities and the eyelids demonstrated no xanthelasmas.   HEENT: normal oral mucosa, no oral pallor and no oral cyanosis.   Neck: normal jugular venous A waves present, normal jugular venous V waves present and no jugular venous leong A waves.   Pulmonary:clear, good air flow, but bases could not be evaluated  Cardiovascular: heart rate and rhythm were normal, normal S1 and S2 distant  Musculoskeletal: the gait could not be assessed..   Extremities: no clubbing of the fingernails, no localized cyanosis, no petechial hemorrhages and no ischemic changes.   Skin: normal skin color and pigmentation, no rash, no venous stasis, no skin lesions, no skin ulcer and no xanthoma was observed.   Psychiatric: confused    LABS:   --------  CBC Full  -  ( 02 Oct 2017 07:05 )  WBC Count : 9.4 K/uL  Hemoglobin : 11.2 g/dL  Hematocrit : 35.1 %  Platelet Count - Automated : 120 K/uL  Mean Cell Volume : 92.9 fl  Mean Cell Hemoglobin : 29.6 pg  Mean Cell Hemoglobin Concentration : 31.8 gm/dL  Auto Neutrophil # : x  Auto Lymphocyte # : x  Auto Monocyte # : x  Auto Eosinophil # : x  Auto Basophil # : x  Auto Neutrophil % : x  Auto Lymphocyte % : x  Auto Monocyte % : x  Auto Eosinophil % : x  Auto Basophil % : x      10-02    141  |  105  |  29<H>  ----------------------------<  90  4.0   |  26  |  1.41<H>    Ca    8.7      02 Oct 2017 07:05    TPro  6.0  /  Alb  3.4  /  TBili  1.4<H>  /  DBili  x   /  AST  40  /  ALT  25  /  AlkPhos  89  10-01       PT/INR - ( 01 Oct 2017 08:30 )   PT: 11.0 sec;   INR: 1.01 ratio         PTT - ( 01 Oct 2017 08:30 )  PTT:31.0 sec  10-01 @ 08:30 BNP: 80295 pg/mL    10-01 @ 08:30 CPK total:--, CKMB --, Troponin I - .045 ng/mL    10/2/2017:  On Tele bed = NSR minimal ectopy.  Confused, but no apparent SOB.  Await Echo  Labs = Hypothyroid        Radiology:

## 2017-10-02 NOTE — SWALLOW BEDSIDE ASSESSMENT ADULT - PHARYNGEAL PHASE
Cough post oral intake/Wet vocal quality post oral intake/Delayed pharyngeal swallow/Throat clear post oral intake Delayed pharyngeal swallow

## 2017-10-02 NOTE — PROGRESS NOTE ADULT - ASSESSMENT
89 year old female, brought to ER for bouts of SOB. Chronic? bilateral effusions  noted. Given IV Lasix with relief.  Now feeling better. Await Echo for EF and to evaluate the Aortic Valve.

## 2017-10-02 NOTE — PROGRESS NOTE ADULT - SUBJECTIVE AND OBJECTIVE BOX
Patient is a 89y old  Female who presents with a chief complaint of Dysnea (01 Oct 2017 13:50)      INTERVAL HPI/OVERNIGHT EVENTS:noacute event    MEDICATIONS  (STANDING):  enalapril 10 milliGRAM(s) Oral daily  isosorbide   dinitrate Tablet (ISORDIL) 30 milliGRAM(s) Oral daily  carbidopa/levodopa  25/100 1 Tablet(s) Oral daily  carvedilol 3.125 milliGRAM(s) Oral every 12 hours  ALBUTerol    90 MICROgram(s) HFA Inhaler 2 Puff(s) Inhalation every 6 hours  tiotropium 18 MICROgram(s) Capsule 1 Capsule(s) Inhalation daily  buDESOnide 160 MICROgram(s)/formoterol 4.5 MICROgram(s) Inhaler 2 Puff(s) Inhalation two times a day  potassium chloride    Tablet ER 10 milliEquivalent(s) Oral daily  fluticasone propionate 50 MICROgram(s)/spray Nasal Spray 1 Spray(s) Both Nostrils daily  pantoprazole    Tablet 40 milliGRAM(s) Oral before breakfast  buPROPion XL . 150 milliGRAM(s) Oral daily  levothyroxine 75 MICROGram(s) Oral daily  multivitamin 1 Tablet(s) Oral daily  furosemide   Injectable 40 milliGRAM(s) IV Push daily  influenza   Vaccine 0.5 milliLiter(s) IntraMuscular once    MEDICATIONS  (PRN):      Allergies    IV Contrast (Unknown)  penicillin (Unknown)    Intolerances          Vital Signs Last 24 Hrs  T(C): 36.5 (02 Oct 2017 05:48), Max: 36.7 (02 Oct 2017 00:44)  T(F): 97.7 (02 Oct 2017 05:48), Max: 98 (02 Oct 2017 00:44)  HR: 63 (02 Oct 2017 05:48) (63 - 91)  BP: 164/77 (02 Oct 2017 05:48) (143/71 - 181/72)  BP(mean): --  RR: 18 (02 Oct 2017 05:48) (18 - 22)  SpO2: 100% (02 Oct 2017 05:48) (95% - 100%)    LABS:                        11.2   9.4   )-----------( 120      ( 02 Oct 2017 07:05 )             35.1     10-02    141  |  105  |  29<H>  ----------------------------<  90  4.0   |  26  |  1.41<H>    Ca    8.7      02 Oct 2017 07:05    TPro  6.0  /  Alb  3.4  /  TBili  1.4<H>  /  DBili  x   /  AST  40  /  ALT  25  /  AlkPhos  89  10-01    PT/INR - ( 01 Oct 2017 08:30 )   PT: 11.0 sec;   INR: 1.01 ratio         PTT - ( 01 Oct 2017 08:30 )  PTT:31.0 sec  Urinalysis Basic - ( 01 Oct 2017 09:22 )    Color: Yellow / Appearance: Clear / S.010 / pH: x  Gluc: x / Ketone: Negative  / Bili: Negative / Urobili: Negative mg/dL   Blood: x / Protein: 30 mg/dL / Nitrite: Negative   Leuk Esterase: Small / RBC: 6-10 /HPF / WBC 6-10   Sq Epi: x / Non Sq Epi: Few / Bacteria: Few      CAPILLARY BLOOD GLUCOSE                  RADIOLOGY & ADDITIONAL TESTS:    Imaging Personally Reviewed:  [x ] YES  [ ] NO    Consultant(s) Notes Reviewed:  [x ] YES  [ ] NO    Care Discussed with Consultants/Other Providers [x ] YES  [ ] NO

## 2017-10-02 NOTE — DIETITIAN INITIAL EVALUATION ADULT. - OTHER INFO
89F adm c [address] [facility] c SOB, found to be in CHF; on lasix. Pt c hx dementia, unable to obtain significant past nutrition hx, pt does states she has a good appetite at present, which was confirmed by NA who reports ~100% po intake of entree at breakfast. Pt currently on Dys#1 pureed diet c nectar thick flds, as per H&P, pt noted to have choked on food in past- also states pt was on pureed diet pta, however, per transfer papers pt on OBEY, lactose free diet at [facility]. SLP consult pending, asp precautions in place. Skin: sacrum, L heel, R heel, L elbow- all stage I pressure ulcers. Pt would benefit from nutritional supplement given compromised skin integrity, <BMI for age; recommend ensure pudding at meals.

## 2017-10-02 NOTE — SWALLOW BEDSIDE ASSESSMENT ADULT - ORAL PHASE
Decreased anterior-posterior movement of the bolus Within functional limits Delayed oral transit time/Decreased anterior-posterior movement of the bolus

## 2017-10-03 DIAGNOSIS — C91.10 CHRONIC LYMPHOCYTIC LEUKEMIA OF B-CELL TYPE NOT HAVING ACHIEVED REMISSION: ICD-10-CM

## 2017-10-03 DIAGNOSIS — J44.9 CHRONIC OBSTRUCTIVE PULMONARY DISEASE, UNSPECIFIED: ICD-10-CM

## 2017-10-03 RX ORDER — ALBUTEROL 90 UG/1
2.5 AEROSOL, METERED ORAL EVERY 6 HOURS
Qty: 0 | Refills: 0 | Status: DISCONTINUED | OUTPATIENT
Start: 2017-10-03 | End: 2017-10-12

## 2017-10-03 RX ORDER — FUROSEMIDE 40 MG
20 TABLET ORAL DAILY
Qty: 0 | Refills: 0 | Status: DISCONTINUED | OUTPATIENT
Start: 2017-10-03 | End: 2017-10-09

## 2017-10-03 RX ORDER — ACETAMINOPHEN 500 MG
650 TABLET ORAL EVERY 6 HOURS
Qty: 0 | Refills: 0 | Status: DISCONTINUED | OUTPATIENT
Start: 2017-10-03 | End: 2017-10-12

## 2017-10-03 RX ADMIN — ALBUTEROL 2 PUFF(S): 90 AEROSOL, METERED ORAL at 00:26

## 2017-10-03 RX ADMIN — ISOSORBIDE DINITRATE 30 MILLIGRAM(S): 5 TABLET ORAL at 11:17

## 2017-10-03 RX ADMIN — BUDESONIDE AND FORMOTEROL FUMARATE DIHYDRATE 2 PUFF(S): 160; 4.5 AEROSOL RESPIRATORY (INHALATION) at 05:26

## 2017-10-03 RX ADMIN — Medication 1 TABLET(S): at 11:17

## 2017-10-03 RX ADMIN — Medication 75 MICROGRAM(S): at 05:25

## 2017-10-03 RX ADMIN — CARVEDILOL PHOSPHATE 3.12 MILLIGRAM(S): 80 CAPSULE, EXTENDED RELEASE ORAL at 17:13

## 2017-10-03 RX ADMIN — Medication 650 MILLIGRAM(S): at 13:46

## 2017-10-03 RX ADMIN — ALBUTEROL 2 PUFF(S): 90 AEROSOL, METERED ORAL at 05:32

## 2017-10-03 RX ADMIN — Medication 40 MILLIGRAM(S): at 05:25

## 2017-10-03 RX ADMIN — PANTOPRAZOLE SODIUM 40 MILLIGRAM(S): 20 TABLET, DELAYED RELEASE ORAL at 06:44

## 2017-10-03 RX ADMIN — Medication 10 MILLIGRAM(S): at 05:30

## 2017-10-03 RX ADMIN — TIOTROPIUM BROMIDE 1 CAPSULE(S): 18 CAPSULE ORAL; RESPIRATORY (INHALATION) at 06:44

## 2017-10-03 RX ADMIN — Medication 1 SPRAY(S): at 11:18

## 2017-10-03 RX ADMIN — CARVEDILOL PHOSPHATE 3.12 MILLIGRAM(S): 80 CAPSULE, EXTENDED RELEASE ORAL at 05:31

## 2017-10-03 RX ADMIN — Medication 10 MILLIEQUIVALENT(S): at 11:17

## 2017-10-03 RX ADMIN — CARBIDOPA AND LEVODOPA 1 TABLET(S): 25; 100 TABLET ORAL at 11:17

## 2017-10-03 RX ADMIN — BUPROPION HYDROCHLORIDE 150 MILLIGRAM(S): 150 TABLET, EXTENDED RELEASE ORAL at 11:17

## 2017-10-03 NOTE — PROGRESS NOTE ADULT - ASSESSMENT
89 yr old with PMH of Dementia,depression,severe aortic stenosis, chf, CVA- haemorruagic stroke in 2005,severe GI bleed 4 yrs back,pleural effusion, gait disbility following CVA,possible parkinsonafter CVa,hypothyroid, lactose intolerant,allergic to penicillin and contrast dye,recently treated with levofloxacin for possible lung infection, sent from assisted living for SOB, as per son pt does have intermittent episodes of SOB, and is usually better if stays upright, and possible also chokes on food, loosing weight , takes puree diet , no fever, no loss of appetite, ambulates with walker , no bowel complaint,    history obtained from SON who is a podiatry  Pt has living will is full code at  present  admitted with SOB has elevated pro BNP, with b/l pleural effusion, most likely acute exacerbation of CHF with aortic insufficiency,h/o CVA, frailitity, ambulatory disabilty , with possible dysphagia, arf,

## 2017-10-03 NOTE — PROGRESS NOTE ADULT - SUBJECTIVE AND OBJECTIVE BOX
Patient is a 89y Female with a known history of :  COPD (chronic obstructive pulmonary disease) (J44.9)  CLL (chronic lymphocytic leukemia) (C91.10)  Aortic stenosis (I35.0)  Coronary artery disease (I25.10)  Hypothyroidism (E03.9)  Pleural effusion (J90)  Dysphagia, unspecified type (R13.10)  Gastroesophageal reflux disease without esophagitis (K21.9)  Hypothyroidism, unspecified type (E03.9)  Chronic obstructive pulmonary disease, unspecified COPD type (J44.9)  Dementia with behavioral disturbance, unspecified dementia type (F03.91)  Congestive heart failure, unspecified congestive heart failure chronicity, unspecified congestive heart failure type (I50.9)    HPI:  89 yr old with PMH of Dementia,depression,severe aortic stenosis, chf, CVA- haemorruagic stroke in 2005,severe GI bleed 4 yrs back,pleural effusion, gait disbility following CVA,possible parkinsonafter CVa,hypothyroid, lactose intolerant,allergic to penicillin and contrast dye,recently treated with levofloxacin for possible lung infection, sent from assisted living for SOB, as per son pt does have intermittent episodes of SOB, and is usually better if stays upright, and possible also chokes on food, loosing weight , takes puree diet , no fever, no loss of appetite, ambulates with walker , no bowel complaint,    history obtained from SON who is a podiatry  Pt has living will is full code at  present (01 Oct 2017 10:07)      REVIEW OF SYSTEMS:    CONSTITUTIONAL: No fever, weight loss, or fatigue  EYES: No eye pain, visual disturbances, or discharge  ENMT:  No difficulty hearing, tinnitus, vertigo; No sinus or throat pain  NECK: No pain or stiffness  BREASTS: No pain, masses, or nipple discharge  RESPIRATORY: No cough, wheezing, chills or hemoptysis; No shortness of breath  CARDIOVASCULAR: No chest pain, palpitations, dizziness, or leg swelling  GASTROINTESTINAL: No abdominal or epigastric pain. No nausea, vomiting, or hematemesis; No diarrhea or constipation. No melena or hematochezia.  GENITOURINARY: No dysuria, frequency, hematuria, or incontinence  NEUROLOGICAL: No headaches, memory loss, loss of strength, numbness, or tremors  SKIN: No itching, burning, rashes, or lesions   LYMPH NODES: No enlarged glands  ENDOCRINE: No heat or cold intolerance; No hair loss  MUSCULOSKELETAL: No joint pain or swelling; No muscle, back, or extremity pain  PSYCHIATRIC: No depression, anxiety, mood swings, or difficulty sleeping  HEME/LYMPH: No easy bruising, or bleeding gums  ALLERGY AND IMMUNOLOGIC: No hives or eczema    MEDICATIONS  (STANDING):  buPROPion XL . 150 milliGRAM(s) Oral daily  carbidopa/levodopa  25/100 1 Tablet(s) Oral daily  carvedilol 3.125 milliGRAM(s) Oral every 12 hours  enalapril 10 milliGRAM(s) Oral daily  fluticasone propionate 50 MICROgram(s)/spray Nasal Spray 1 Spray(s) Both Nostrils daily  furosemide   Injectable 40 milliGRAM(s) IV Push daily  influenza   Vaccine 0.5 milliLiter(s) IntraMuscular once  isosorbide   dinitrate Tablet (ISORDIL) 30 milliGRAM(s) Oral daily  levothyroxine 75 MICROGram(s) Oral daily  multivitamin 1 Tablet(s) Oral daily  pantoprazole    Tablet 40 milliGRAM(s) Oral before breakfast  potassium chloride    Tablet ER 10 milliEquivalent(s) Oral daily    MEDICATIONS  (PRN):  ALBUTerol    0.083% 2.5 milliGRAM(s) Nebulizer every 6 hours PRN Shortness of Breath and/or Wheezing      ALLERGIES: IV Contrast (Unknown)  penicillin (Unknown)      FAMILY HISTORY:      Social history:  Alochol:   Smoking:   Drug Use:   Marital Status:     PHYSICAL EXAMINATION:  -----------------------------  T(C): 36.4 (10-03-17 @ 05:39), Max: 37.4 (10-02-17 @ 17:06)  HR: 58 (10-03-17 @ 05:39) (57 - 74)  BP: 185/48 (10-03-17 @ 05:39) (119/69 - 185/48)  RR: 18 (10-03-17 @ 05:39) (18 - 18)  SpO2: 92% (10-03-17 @ 05:39) (91% - 96%)  Wt(kg): --    10-02 @ 07:01  -  10-03 @ 07:00  --------------------------------------------------------  IN:    Oral Fluid: 250 mL  Total IN: 250 mL    OUT:  Total OUT: 0 mL    Total NET: 250 mL      10-03 @ 07:01  -  10-03 @ 08:58  --------------------------------------------------------  IN:    Oral Fluid: 420 mL  Total IN: 420 mL    OUT:  Total OUT: 0 mL    Total NET: 420 mL            Constitutional: well developed, normal appearance, well groomed, well nourished, no deformities and no acute distress.   Eyes: the conjunctiva exhibited no abnormalities and the eyelids demonstrated no xanthelasmas.   HEENT: normal oral mucosa, no oral pallor and no oral cyanosis.   Neck: normal jugular venous A waves present, normal jugular venous V waves present and no jugular venous leong A waves.   Pulmonary: no respiratory distress, normal respiratory rhythm and effort, no accessory muscle use and lungs were clear to auscultation bilaterally.   Cardiovascular: heart rate and rhythm were normal, normal S1 and S2 and no murmur, gallop, rub, heave or thrill are present. Distant sounds.  Musculoskeletal: the gait could not be assessed.  Extremities: no clubbing of the fingernails, no localized cyanosis, no petechial hemorrhages and no ischemic changes.   Skin: normal skin color and pigmentation, no rash, no venous stasis, no skin lesions, no skin ulcer and no xanthoma was observed.   Psychiatric: oriented to person, place, and time, the affect was normal, the mood was normal and not feeling anxious.     LABS:   --------  10-02    141  |  105  |  29<H>  ----------------------------<  90  4.0   |  26  |  1.41<H>    Ca    8.7      02 Oct 2017 07:05                           11.2   9.4   )-----------( 120      ( 02 Oct 2017 07:05 )             35.1       EKG: NSR, APC, LAD/LAHB, NSST-T wave changes.      Culture Results:   10,000 - 49,000 CFU/mL Escherichia coli (10-01 @ 19:22)  Culture Results:   No growth to date. (10-01 @ 11:54)  Culture Results:   No growth to date. (10-01 @ 11:54)    10-01 @ 19:22    Organism --   Gram Stain Blood -- Gram Stain --  Specimen Source .Urine Catheterized  Culture-Blood --    10-01 @ 11:54    Organism --   Gram Stain Blood -- Gram Stain --  Specimen Source .Blood Blood  Culture-Blood --        Radiology:    < from: US Transthoracic Echocardiogram w/Doppler Complete (10.02.17 @ 09:05) >    EXAM:  US TTE W DOPPLER COMPLETE                                  PROCEDURE DATE:  10/02/2017          INTERPRETATION:    Indication: CHF    Technician: Mannie Tolliver    Study Quality:  fair     Measurements:   A root3.0 cm,LA 5.9cm, LVEDD 4.5cm,2.6cm, septal wall   thickness 1.3 cm, posterior wall thickness 1.3 cm   AV velocity 1,7   m/sec, MV velocity 1.1 m /sec  EF 65%    Mitral Valve: mild mitral annular calcification .thickened mitral valve   with moderate regurgitation ,  Aortic Valve:Thickened aortic valve with moderate to  severe   regurgitation   Left Atrium:  severely enlarged  Left Ventricle: Normal size , moderate left ventricular hypertrophy with   normal EF   ,  Pericardium: Trace pericardial effusion   Tricuspid Valve: Appears normal with mild regurgitation with RVSp 31  mm   hg   Pulmonic Valve: appears normal with mild regurgitation   Right Ventricle: Normal size with normal systolic function   Right Atrium: enlarged    Large pleural effusion noted     SUMMARY:  1. Left ventricular hypertrophy with normal EF  2. Biatrial enlargement   3. aortic sclerosis moderate to  severe AI   4. mild Tricuspid regurgitation with RVSP 31 mm hg .  5. Moderate mitral regurgitation.                  MINE [facility] MEDICINE/CARDIOLOGY  This document has been electronically signed. Oct  3 2017  8:16AM                < end of copied text >

## 2017-10-03 NOTE — PROGRESS NOTE ADULT - SUBJECTIVE AND OBJECTIVE BOX
Date/Time Patient Seen:  		  Referring MD:   Data Reviewed	       Patient is a 89y old  Female who presents with a chief complaint of Dysnea (01 Oct 2017 13:50)  in bed  seen and examined  vs and meds reviewed      Subjective/HPI     PAST MEDICAL & SURGICAL HISTORY:  CLL (chronic lymphocytic leukemia)  Dementia with behavioral disturbance, unspecified dementia type  Gastrointestinal hemorrhage, unspecified gastrointestinal hemorrhage type  Nontraumatic intracerebral hemorrhage, unspecified cerebral location, unspecified laterality  Rhinitis  Anxiety  GERD (gastroesophageal reflux disease)  Hypothyroidism  Coronary artery disease  COPD (chronic obstructive pulmonary disease)  Parkinson's disease  Closed fracture of neck of left femur, initial encounter        Medication list         MEDICATIONS  (STANDING):  buPROPion XL . 150 milliGRAM(s) Oral daily  carbidopa/levodopa  25/100 1 Tablet(s) Oral daily  carvedilol 3.125 milliGRAM(s) Oral every 12 hours  enalapril 10 milliGRAM(s) Oral daily  fluticasone propionate 50 MICROgram(s)/spray Nasal Spray 1 Spray(s) Both Nostrils daily  furosemide   Injectable 40 milliGRAM(s) IV Push daily  influenza   Vaccine 0.5 milliLiter(s) IntraMuscular once  isosorbide   dinitrate Tablet (ISORDIL) 30 milliGRAM(s) Oral daily  levothyroxine 75 MICROGram(s) Oral daily  multivitamin 1 Tablet(s) Oral daily  pantoprazole    Tablet 40 milliGRAM(s) Oral before breakfast  potassium chloride    Tablet ER 10 milliEquivalent(s) Oral daily    MEDICATIONS  (PRN):  ALBUTerol    0.083% 2.5 milliGRAM(s) Nebulizer every 6 hours PRN Shortness of Breath and/or Wheezing         Vitals log        ICU Vital Signs Last 24 Hrs  T(C): 36.4 (03 Oct 2017 05:39), Max: 37.4 (02 Oct 2017 17:06)  T(F): 97.5 (03 Oct 2017 05:39), Max: 99.4 (02 Oct 2017 17:06)  HR: 58 (03 Oct 2017 05:39) (57 - 74)  BP: 185/48 (03 Oct 2017 05:39) (119/69 - 185/48)  BP(mean): --  ABP: --  ABP(mean): --  RR: 18 (03 Oct 2017 05:39) (18 - 18)  SpO2: 92% (03 Oct 2017 05:39) (91% - 96%)           Input and Output:  I&O's Detail    02 Oct 2017 07:01  -  03 Oct 2017 07:00  --------------------------------------------------------  IN:    Oral Fluid: 250 mL  Total IN: 250 mL    OUT:  Total OUT: 0 mL    Total NET: 250 mL          Lab Data                        11.2   9.4   )-----------( 120      ( 02 Oct 2017 07:05 )             35.1     10-02    141  |  105  |  29<H>  ----------------------------<  90  4.0   |  26  |  1.41<H>    Ca    8.7      02 Oct 2017 07:05    TPro  6.0  /  Alb  3.4  /  TBili  1.4<H>  /  DBili  x   /  AST  40  /  ALT  25  /  AlkPhos  89  10-01      CARDIAC MARKERS ( 01 Oct 2017 08:30 )  .045 ng/mL / x     / x     / x     / 3.8 ng/mL        Review of Systems	      Objective     Physical Examination    head at  heart - s1s2  lungs - dec BS  abd - soft      Pertinent Lab findings & Imaging      Desire:  NO   Adequate UO     I&O's Detail    02 Oct 2017 07:01  -  03 Oct 2017 07:00  --------------------------------------------------------  IN:    Oral Fluid: 250 mL  Total IN: 250 mL    OUT:  Total OUT: 0 mL    Total NET: 250 mL               Discussed with:     Cultures:	        Radiology

## 2017-10-03 NOTE — PROGRESS NOTE ADULT - SUBJECTIVE AND OBJECTIVE BOX
Patient is a 89y old  Female who presents with a chief complaint of Dysnea (01 Oct 2017 13:50)      INTERVAL HPI/OVERNIGHT EVENTS:no acute event     MEDICATIONS  (STANDING):  buPROPion XL . 150 milliGRAM(s) Oral daily  carbidopa/levodopa  25/100 1 Tablet(s) Oral daily  carvedilol 3.125 milliGRAM(s) Oral every 12 hours  enalapril 10 milliGRAM(s) Oral daily  fluticasone propionate 50 MICROgram(s)/spray Nasal Spray 1 Spray(s) Both Nostrils daily  furosemide    Tablet 20 milliGRAM(s) Oral daily  influenza   Vaccine 0.5 milliLiter(s) IntraMuscular once  isosorbide   dinitrate Tablet (ISORDIL) 30 milliGRAM(s) Oral daily  levothyroxine 75 MICROGram(s) Oral daily  multivitamin 1 Tablet(s) Oral daily  pantoprazole    Tablet 40 milliGRAM(s) Oral before breakfast  potassium chloride    Tablet ER 10 milliEquivalent(s) Oral daily    MEDICATIONS  (PRN):  ALBUTerol    0.083% 2.5 milliGRAM(s) Nebulizer every 6 hours PRN Shortness of Breath and/or Wheezing      Allergies    IV Contrast (Unknown)  penicillin (Unknown)    Intolerances          Vital Signs Last 24 Hrs  T(C): 36.4 (03 Oct 2017 05:39), Max: 37.4 (02 Oct 2017 17:06)  T(F): 97.5 (03 Oct 2017 05:39), Max: 99.4 (02 Oct 2017 17:06)  HR: 58 (03 Oct 2017 05:39) (57 - 74)  BP: 155/75 (03 Oct 2017 09:32) (119/69 - 185/48)  BP(mean): --  RR: 18 (03 Oct 2017 09:32) (18 - 18)  SpO2: 95% (03 Oct 2017 09:32) (91% - 96%)    LABS:                        11.2   9.4   )-----------( 120      ( 02 Oct 2017 07:05 )             35.1     10-02    141  |  105  |  29<H>  ----------------------------<  90  4.0   |  26  |  1.41<H>    Ca    8.7      02 Oct 2017 07:05          CAPILLARY BLOOD GLUCOSE              10-02 @ 07:01  -  10-03 @ 07:00  --------------------------------------------------------  IN: 250 mL / OUT: 0 mL / NET: 250 mL    10-03 @ 07:01  -  10-03 @ 09:35  --------------------------------------------------------  IN: 420 mL / OUT: 0 mL / NET: 420 mL          RADIOLOGY & ADDITIONAL TESTS:    Imaging Personally Reviewed:  [x ] YES  [ ] NO    Consultant(s) Notes Reviewed:  [x ] YES  [ ] NO    Care Discussed with Consultants/Other Providers [ x] YES  [ ] NO

## 2017-10-03 NOTE — PROGRESS NOTE ADULT - ASSESSMENT
89 year old female, history dementia, CLL, ?aortic stenosis, pleural effuisons, s/p CVA. Brought to ER for shortness of breath. Chronic? bilateral effusions  noted. Originally given IV Lasix with relief.  Now feeling better.     10/3/17   No complaints offered.  Patient eating breakfast.  No significant arrhythmias noted.    Plan:  - Continue present therapy.  - Decrease Lasix.  - Ambulate with assistance if possible.  - See above Echocardiogram report.  - Being followed by Pulmonary.

## 2017-10-04 DIAGNOSIS — I35.1 NONRHEUMATIC AORTIC (VALVE) INSUFFICIENCY: ICD-10-CM

## 2017-10-04 DIAGNOSIS — A49.9 BACTERIAL INFECTION, UNSPECIFIED: ICD-10-CM

## 2017-10-04 LAB
ANION GAP SERPL CALC-SCNC: 8 MMOL/L — SIGNIFICANT CHANGE UP (ref 5–17)
BUN SERPL-MCNC: 44 MG/DL — HIGH (ref 7–23)
CALCIUM SERPL-MCNC: 8.8 MG/DL — SIGNIFICANT CHANGE UP (ref 8.4–10.5)
CHLORIDE SERPL-SCNC: 104 MMOL/L — SIGNIFICANT CHANGE UP (ref 96–108)
CO2 SERPL-SCNC: 28 MMOL/L — SIGNIFICANT CHANGE UP (ref 22–31)
CREAT SERPL-MCNC: 1.74 MG/DL — HIGH (ref 0.5–1.3)
GLUCOSE SERPL-MCNC: 93 MG/DL — SIGNIFICANT CHANGE UP (ref 70–99)
HCT VFR BLD CALC: 35.2 % — SIGNIFICANT CHANGE UP (ref 34.5–45)
HGB BLD-MCNC: 11.6 G/DL — SIGNIFICANT CHANGE UP (ref 11.5–15.5)
MCHC RBC-ENTMCNC: 30.1 PG — SIGNIFICANT CHANGE UP (ref 27–34)
MCHC RBC-ENTMCNC: 32.8 GM/DL — SIGNIFICANT CHANGE UP (ref 32–36)
MCV RBC AUTO: 91.8 FL — SIGNIFICANT CHANGE UP (ref 80–100)
PLATELET # BLD AUTO: 110 K/UL — LOW (ref 150–400)
POTASSIUM SERPL-MCNC: 4.2 MMOL/L — SIGNIFICANT CHANGE UP (ref 3.5–5.3)
POTASSIUM SERPL-SCNC: 4.2 MMOL/L — SIGNIFICANT CHANGE UP (ref 3.5–5.3)
RBC # BLD: 3.84 M/UL — SIGNIFICANT CHANGE UP (ref 3.8–5.2)
RBC # FLD: 14.5 % — SIGNIFICANT CHANGE UP (ref 10.3–14.5)
SODIUM SERPL-SCNC: 140 MMOL/L — SIGNIFICANT CHANGE UP (ref 135–145)
WBC # BLD: 9.2 K/UL — SIGNIFICANT CHANGE UP (ref 3.8–10.5)
WBC # FLD AUTO: 9.2 K/UL — SIGNIFICANT CHANGE UP (ref 3.8–10.5)

## 2017-10-04 RX ORDER — HYDRALAZINE HCL 50 MG
25 TABLET ORAL EVERY 8 HOURS
Qty: 0 | Refills: 0 | Status: DISCONTINUED | OUTPATIENT
Start: 2017-10-04 | End: 2017-10-06

## 2017-10-04 RX ORDER — ERTAPENEM SODIUM 1 G/1
1000 INJECTION, POWDER, LYOPHILIZED, FOR SOLUTION INTRAMUSCULAR; INTRAVENOUS ONCE
Qty: 0 | Refills: 0 | Status: COMPLETED | OUTPATIENT
Start: 2017-10-04 | End: 2017-10-04

## 2017-10-04 RX ADMIN — Medication 25 MILLIGRAM(S): at 21:41

## 2017-10-04 RX ADMIN — ISOSORBIDE DINITRATE 30 MILLIGRAM(S): 5 TABLET ORAL at 12:11

## 2017-10-04 RX ADMIN — Medication 1 SPRAY(S): at 12:11

## 2017-10-04 RX ADMIN — ERTAPENEM SODIUM 120 MILLIGRAM(S): 1 INJECTION, POWDER, LYOPHILIZED, FOR SOLUTION INTRAMUSCULAR; INTRAVENOUS at 08:22

## 2017-10-04 RX ADMIN — CARVEDILOL PHOSPHATE 3.12 MILLIGRAM(S): 80 CAPSULE, EXTENDED RELEASE ORAL at 05:49

## 2017-10-04 RX ADMIN — Medication 10 MILLIGRAM(S): at 05:49

## 2017-10-04 RX ADMIN — BUPROPION HYDROCHLORIDE 150 MILLIGRAM(S): 150 TABLET, EXTENDED RELEASE ORAL at 12:11

## 2017-10-04 RX ADMIN — Medication 10 MILLIEQUIVALENT(S): at 12:11

## 2017-10-04 RX ADMIN — Medication 25 MILLIGRAM(S): at 13:25

## 2017-10-04 RX ADMIN — Medication 1 TABLET(S): at 12:11

## 2017-10-04 RX ADMIN — PANTOPRAZOLE SODIUM 40 MILLIGRAM(S): 20 TABLET, DELAYED RELEASE ORAL at 05:49

## 2017-10-04 RX ADMIN — Medication 20 MILLIGRAM(S): at 05:49

## 2017-10-04 RX ADMIN — CARBIDOPA AND LEVODOPA 1 TABLET(S): 25; 100 TABLET ORAL at 12:11

## 2017-10-04 RX ADMIN — Medication 75 MICROGRAM(S): at 05:49

## 2017-10-04 NOTE — CONSULT NOTE ADULT - SUBJECTIVE AND OBJECTIVE BOX
HPI:  89 yr old with PMH of Dementia,depression, severe AS, chf, sent from assisted living due  to SOB. No documented fever chills n/v/diarrhea. Pt denies any symptoms. Pt is a poor   historian, Denies urinary symptoms. uA unremarkable but Ucx grew ESBL 10-50K cfu/ml.    Infectious Disease consult was called to evaluate pt for poss UTI.    Past Medical & Surgical Hx:  PAST MEDICAL & SURGICAL HISTORY:  CVA- haemorrhagic stroke in 2005  CLL (chronic lymphocytic leukemia)  Dementia with behavioral disturbance, unspecified dementia type  Gastrointestinal hemorrhage, unspecified gastrointestinal hemorrhage type  Nontraumatic intracerebral hemorrhage, unspecified cerebral location, unspecified laterality  Rhinitis  Anxiety  GERD (gastroesophageal reflux disease)  Hypothyroidism  Coronary artery disease  COPD (chronic obstructive pulmonary disease)  Closed fracture of neck of left femur, initial encounter      Social History--  EtOH: denies   Tobacco: denies   Drug Use: denies   Resident of [facility]    FAMILY HISTORY:  Noncontributory    Allergies  IV Contrast (Unknown)  penicillin (Unknown)    Home Medications:   * Patient Currently Takes Medications as of 01-Oct-2017 09:20 documented in Structured Notes  · 	Wellbutrin  mg/24 hours oral tablet, extended release: 1 tab(s) orally every 24 hours, Last Dose Taken:    · 	Calcium 600+D 600 mg-200 intl units oral tablet: 1 tab(s) orally once a day, Last Dose Taken:    · 	carbidopa-levodopa 25 mg-100 mg oral tablet: 1 tab(s) orally once a day, Last Dose Taken:    · 	enalapril 10 mg oral tablet: 1 tab(s) orally once a day, Last Dose Taken:    · 	isosorbide dinitrate 30 mg oral tablet: 1 tab(s) orally once a day, Last Dose Taken:    · 	levothyroxine 75 mcg (0.075 mg) oral tablet: 1 tab(s) orally once a day, Last Dose Taken:    · 	Multiple Vitamins oral tablet: 1 tab(s) orally once a day, Last Dose Taken:    · 	omeprazole 20 mg oral delayed release capsule: 1 cap(s) orally once a day, Last Dose Taken:    · 	carvedilol 3.125 mg oral tablet: 1 tab(s) orally 2 times a day, Last Dose Taken:    · 	fluticasone 50 mcg/inh nasal spray: 1 spray(s) in each nostril once a day, Last Dose Taken:    · 	DuoNeb 0.5 mg-2.5 mg/3 mL inhalation solution: 3 milliliter(s) inhaled 2 times a day, Last Dose Taken:    · 	Symbicort 160 mcg-4.5 mcg/inh inhalation aerosol: 2 puff(s) inhaled 2 times a day, Last Dose Taken:    · 	bacitracin 500 units/g topical ointment: Apply topically to affected area once a day, Last Dose Taken:    · 	furosemide 20 mg oral tablet: 1 tab(s) orally once a day  · 	potassium chloride 10 mEq oral tablet, extended release: 1 tab(s) orally once a day, Last Dose Taken:        Current Inpatient Medications :    ANTIBIOTICS:   NONE    OTHER RELEVANT MEDICATIONS :  acetaminophen   Tablet 650 milliGRAM(s) Oral every 6 hours PRN  ALBUTerol    0.083% 2.5 milliGRAM(s) Nebulizer every 6 hours PRN  buPROPion XL . 150 milliGRAM(s) Oral daily  carbidopa/levodopa  25/100 1 Tablet(s) Oral daily  carvedilol 3.125 milliGRAM(s) Oral every 12 hours  fluticasone propionate 50 MICROgram(s)/spray Nasal Spray 1 Spray(s) Both Nostrils daily  furosemide    Tablet 20 milliGRAM(s) Oral daily  hydrALAZINE 25 milliGRAM(s) Oral every 8 hours  influenza   Vaccine 0.5 milliLiter(s) IntraMuscular once  isosorbide   dinitrate Tablet (ISORDIL) 30 milliGRAM(s) Oral daily  levothyroxine 75 MICROGram(s) Oral daily  multivitamin 1 Tablet(s) Oral daily  pantoprazole    Tablet 40 milliGRAM(s) Oral before breakfast  potassium chloride    Tablet ER 10 milliEquivalent(s) Oral daily      ROS:  Unable to obtain due to :     ROS:  CONSTITUTIONAL:  Negative fever or chills, feels well,   EYES:  Negative  blurry vision or double vision  CARDIOVASCULAR:  Negative for chest pain or palpitations  RESPIRATORY:  Negative for cough, wheezing, or SOB   GASTROINTESTINAL:  Negative for nausea, vomiting, diarrhea, constipation, or abdominal pain  GENITOURINARY:  Negative frequency, urgency , dysuria or hematuria   NEUROLOGIC:  No headache, dizziness, lightheadedness +confusion  All other systems were reviewed and are negative      I&O's Detail    03 Oct 2017 07:01  -  04 Oct 2017 07:00  --------------------------------------------------------  IN:    IV PiggyBack: 100 mL    Oral Fluid: 1160 mL  Total IN: 1260 mL    OUT:  Total OUT: 0 mL    Total NET: 1260 mL      04 Oct 2017 07:01  -  04 Oct 2017 17:08  --------------------------------------------------------  IN:    Oral Fluid: 670 mL  Total IN: 670 mL    OUT:  Total OUT: 0 mL    Total NET: 670 mL    Physical Exam:  Vital Signs Last 24 Hrs  T(C): 36.8 (04 Oct 2017 11:59), Max: 36.8 (03 Oct 2017 17:24)  T(F): 98.3 (04 Oct 2017 11:59), Max: 98.3 (03 Oct 2017 17:24)  HR: 64 (04 Oct 2017 11:59) (64 - 75)  BP: 159/80 (04 Oct 2017 11:59) (122/78 - 159/80)  BP(mean): --  RR: 17 (04 Oct 2017 11:59) (16 - 18)  SpO2: 99% (04 Oct 2017 11:59) (92% - 99%)      General: No acute distress  Eyes: sclera anicteric, pupils equal and reactive to light  ENMT: buccal mucosa moist, pharynx not injected  Neck: supple, trachea midline  Lungs: clear, no wheeze/rhonchi  Cardiovascular: regular rate and rhythm, S1 S2  Abdomen: soft, nontender, no organomegaly present, bowel sounds normal  Neurological:  alert confused, Cranial Nerves II-XII grossly intact  Skin:no increased ecchymosis/petechiae/purpura  Lymph Nodes: no palpable cervical/supraclavicular lymph nodes enlargements  Extremities: no cyanosis/clubbing/edema    Labs:     10-04    140  |  104  |  44<H>  ----------------------------<  93  4.2   |  28  |  1.74<H>    Ca    8.8      04 Oct 2017 07:42                            11.6   9.2   )-----------( 110      ( 04 Oct 2017 07:42 )             35.2     RECENT CULTURES:  Culture - Urine (10.01.17 @ 19:22)    -  Amikacin: S <=8    -  Ampicillin: R >16    -  Ampicillin/Sulbactam: R 8/4    -  Aztreonam: R 16    -  Cefazolin: R >16    -  Cefepime: R >16    -  Cefoxitin: S 8    -  Ceftazidime: R 8    -  Ceftriaxone: R >32    -  Ciprofloxacin: R >2    -  Ertapenem: S <=0.5    -  Gentamicin: S <=1    -  Imipenem: S <=1    -  Levofloxacin: R >4    -  Meropenem: S <=1    -  Nitrofurantoin: S <=32    -  Piperacillin/Tazobactam: R <=8    -  Tobramycin: S <=2    -  Trimethoprim/Sulfamethoxazole: R >2/38    Specimen Source: .Urine Catheterized    Culture Results:   10,000 - 49,000 CFU/mL Escherichia coli ESBL    Organism Identification: Escherichia coli ESBL    Organism: Escherichia coli ESBL    Method Type: ZAINA    Culture - Blood (10.01.17 @ 11:54)    Specimen Source: .Blood Blood    Culture Results:   No growth to date.          RADIOLOGY & ADDITIONAL STUDIES:    EXAM:  CHEST PORTABLE ROUTINE                                  PROCEDURE DATE:  10/02/2017          INTERPRETATION:  Clinical information: CHF, effusion    Portable study, 6:14 AM.    Comparison exam dated 10/1/2017, 8:00 AM.    Cardiac monitor leads present. Bilateral pleural effusions left greater   than right, unchanged since prior study. Vascular congestive changes   present.   Heart size could not be evaluated, loss of definition of   cardiac contours. No acute osseous abnormalities.    IMPRESSION:    No significant change since prior exam.      Assessment :   89 yr old with PMH of Dementia, severe AS, admitted  with SOB   COPD/CHF exacerbation  Asymptomatic bacteruria with ESBL EColi  No clinical evidence for UTI    Plan :   No indication for antibiotic  Stable from ID standpoint    Continue with present regiment .  Appropriate use of antibiotics and adverse effects reviewed.    I have discussed the above plan of care with patient/family in detail. They expressed understanding of the treatment plan . Risks, benefits and alternatives discussed in detail.   I have asked if they have any questions or concerns and appropriately addressed them to the best of my ability .      > 35 minutes spent in direct patient care reviewing  the notes, lab data/ imaging , discussion with multidisciplinary team. All questions were addressed and answered to the best of my capacity .    Thank you for allowing me to participate in the care of your patient .      Darien Boateng MD  Infectious Disease  [phone]

## 2017-10-04 NOTE — PROGRESS NOTE ADULT - SUBJECTIVE AND OBJECTIVE BOX
Date/Time Patient Seen:  		  Referring MD:   Data Reviewed	       Patient is a 89y old  Female who presents with a chief complaint of Dysnea (01 Oct 2017 13:50)  in bed  seen and examined  vs and meds reviewed      Subjective/HPI     PAST MEDICAL & SURGICAL HISTORY:  CLL (chronic lymphocytic leukemia)  Dementia with behavioral disturbance, unspecified dementia type  Gastrointestinal hemorrhage, unspecified gastrointestinal hemorrhage type  Nontraumatic intracerebral hemorrhage, unspecified cerebral location, unspecified laterality  Rhinitis  Anxiety  GERD (gastroesophageal reflux disease)  Hypothyroidism  Coronary artery disease  COPD (chronic obstructive pulmonary disease)  Parkinson's disease  Closed fracture of neck of left femur, initial encounter        Medication list         MEDICATIONS  (STANDING):  buPROPion XL . 150 milliGRAM(s) Oral daily  carbidopa/levodopa  25/100 1 Tablet(s) Oral daily  carvedilol 3.125 milliGRAM(s) Oral every 12 hours  enalapril 10 milliGRAM(s) Oral daily  ertapenem  IVPB 1000 milliGRAM(s) IV Intermittent once  fluticasone propionate 50 MICROgram(s)/spray Nasal Spray 1 Spray(s) Both Nostrils daily  furosemide    Tablet 20 milliGRAM(s) Oral daily  influenza   Vaccine 0.5 milliLiter(s) IntraMuscular once  isosorbide   dinitrate Tablet (ISORDIL) 30 milliGRAM(s) Oral daily  levothyroxine 75 MICROGram(s) Oral daily  multivitamin 1 Tablet(s) Oral daily  pantoprazole    Tablet 40 milliGRAM(s) Oral before breakfast  potassium chloride    Tablet ER 10 milliEquivalent(s) Oral daily    MEDICATIONS  (PRN):  acetaminophen   Tablet 650 milliGRAM(s) Oral every 6 hours PRN moderate pain 4-6  ALBUTerol    0.083% 2.5 milliGRAM(s) Nebulizer every 6 hours PRN Shortness of Breath and/or Wheezing         Vitals log        ICU Vital Signs Last 24 Hrs  T(C): 36.7 (04 Oct 2017 05:37), Max: 36.8 (03 Oct 2017 17:24)  T(F): 98 (04 Oct 2017 05:37), Max: 98.3 (03 Oct 2017 17:24)  HR: 65 (04 Oct 2017 05:37) (64 - 75)  BP: 155/75 (04 Oct 2017 05:37) (109/58 - 155/75)  BP(mean): --  ABP: --  ABP(mean): --  RR: 18 (04 Oct 2017 05:37) (18 - 18)  SpO2: 95% (04 Oct 2017 05:37) (92% - 98%)           Input and Output:  I&O's Detail    02 Oct 2017 07:01  -  03 Oct 2017 07:00  --------------------------------------------------------  IN:    Oral Fluid: 250 mL  Total IN: 250 mL    OUT:  Total OUT: 0 mL    Total NET: 250 mL      03 Oct 2017 07:01  -  04 Oct 2017 06:48  --------------------------------------------------------  IN:    IV PiggyBack: 100 mL    Oral Fluid: 1160 mL  Total IN: 1260 mL    OUT:  Total OUT: 0 mL    Total NET: 1260 mL          Lab Data                        11.2   9.4   )-----------( 120      ( 02 Oct 2017 07:05 )             35.1     10-02    141  |  105  |  29<H>  ----------------------------<  90  4.0   |  26  |  1.41<H>    Ca    8.7      02 Oct 2017 07:05              Review of Systems	      Objective     Physical Examination    frail  weak  head at  heart - s1s2  lungs - dec BS  abd - soft      Pertinent Lab findings & Imaging      Desire:  NO   Adequate UO     I&O's Detail    02 Oct 2017 07:01  -  03 Oct 2017 07:00  --------------------------------------------------------  IN:    Oral Fluid: 250 mL  Total IN: 250 mL    OUT:  Total OUT: 0 mL    Total NET: 250 mL      03 Oct 2017 07:01  -  04 Oct 2017 06:48  --------------------------------------------------------  IN:    IV PiggyBack: 100 mL    Oral Fluid: 1160 mL  Total IN: 1260 mL    OUT:  Total OUT: 0 mL    Total NET: 1260 mL               Discussed with:     Cultures:	        Radiology

## 2017-10-04 NOTE — CONSULT NOTE ADULT - SUBJECTIVE AND OBJECTIVE BOX
Patient is a 89y old  Female who presents with a chief complaint of Dysnea (01 Oct 2017 13:50)    HPI:  89 yr old with PMH of Dementia,depression,severe aortic stenosis, chf, CVA- haemorruagic stroke in 2005,severe GI bleed 4 yrs back,pleural effusion, gait disbility following CVA,possible parkinsonafter CVa,hypothyroid, lactose intolerant,allergic to penicillin and contrast dye,recently treated with levofloxacin for possible lung infection, sent from assisted living for SOB, as per son pt does have intermittent episodes of SOB, and is usually better if stays upright, and possible also chokes on food, loosing weight , takes puree diet , no fever, no loss of appetite, ambulates with walker , no bowel complaint,    history obtained from SON who is a podiatry  Pt has living will is full code at  present (01 Oct 2017 10:07)    Renal consult called for worsening renal function. Pt on IV lasix.     PAST MEDICAL HISTORY:  CLL (chronic lymphocytic leukemia)  Dementia with behavioral disturbance, unspecified dementia type  Gastrointestinal hemorrhage, unspecified gastrointestinal hemorrhage type  Nontraumatic intracerebral hemorrhage, unspecified cerebral location, unspecified laterality  Rhinitis  Anxiety  GERD (gastroesophageal reflux disease)  Hypothyroidism  Coronary artery disease  COPD (chronic obstructive pulmonary disease)  Parkinson's disease      PAST SURGICAL HISTORY:  Closed fracture of neck of left femur, initial encounter      FAMILY HISTORY:      SOCIAL HISTORY: No smoking or alcohol use     Allergies    IV Contrast (Unknown)  penicillin (Unknown)    Home Medications:   * Patient Currently Takes Medications as of 01-Oct-2017 09:20 documented in Structured Notes  · 	Wellbutrin  mg/24 hours oral tablet, extended release: 1 tab(s) orally every 24 hours, Last Dose Taken:    · 	Calcium 600+D 600 mg-200 intl units oral tablet: 1 tab(s) orally once a day, Last Dose Taken:    · 	carbidopa-levodopa 25 mg-100 mg oral tablet: 1 tab(s) orally once a day, Last Dose Taken:    · 	enalapril 10 mg oral tablet: 1 tab(s) orally once a day, Last Dose Taken:    · 	isosorbide dinitrate 30 mg oral tablet: 1 tab(s) orally once a day, Last Dose Taken:    · 	levothyroxine 75 mcg (0.075 mg) oral tablet: 1 tab(s) orally once a day, Last Dose Taken:    · 	Multiple Vitamins oral tablet: 1 tab(s) orally once a day, Last Dose Taken:    · 	omeprazole 20 mg oral delayed release capsule: 1 cap(s) orally once a day, Last Dose Taken:    · 	carvedilol 3.125 mg oral tablet: 1 tab(s) orally 2 times a day, Last Dose Taken:    · 	fluticasone 50 mcg/inh nasal spray: 1 spray(s) in each nostril once a day, Last Dose Taken:    · 	DuoNeb 0.5 mg-2.5 mg/3 mL inhalation solution: 3 milliliter(s) inhaled 2 times a day, Last Dose Taken:    · 	Symbicort 160 mcg-4.5 mcg/inh inhalation aerosol: 2 puff(s) inhaled 2 times a day, Last Dose Taken:    · 	bacitracin 500 units/g topical ointment: Apply topically to affected area once a day, Last Dose Taken:    · 	furosemide 20 mg oral tablet: 1 tab(s) orally once a day  · 	potassium chloride 10 mEq oral tablet, extended release: 1 tab(s) orally once a day, Last Dose Taken:        MEDICATIONS  (STANDING):  buPROPion XL . 150 milliGRAM(s) Oral daily  carbidopa/levodopa  25/100 1 Tablet(s) Oral daily  carvedilol 3.125 milliGRAM(s) Oral every 12 hours  enalapril 10 milliGRAM(s) Oral daily  fluticasone propionate 50 MICROgram(s)/spray Nasal Spray 1 Spray(s) Both Nostrils daily  furosemide    Tablet 20 milliGRAM(s) Oral daily  influenza   Vaccine 0.5 milliLiter(s) IntraMuscular once  isosorbide   dinitrate Tablet (ISORDIL) 30 milliGRAM(s) Oral daily  levothyroxine 75 MICROGram(s) Oral daily  multivitamin 1 Tablet(s) Oral daily  pantoprazole    Tablet 40 milliGRAM(s) Oral before breakfast  potassium chloride    Tablet ER 10 milliEquivalent(s) Oral daily    MEDICATIONS  (PRN):  acetaminophen   Tablet 650 milliGRAM(s) Oral every 6 hours PRN moderate pain 4-6  ALBUTerol    0.083% 2.5 milliGRAM(s) Nebulizer every 6 hours PRN Shortness of Breath and/or Wheezing      REVIEW OF SYSTEMS:  General: NAD  Respiratory: + SOB  Cardiovascular: No CP or Palpitations	  Gastrointestinal: No nausea, Vomiting. No diarrhea  Genitourinary: No urinary complaints	  Musculoskeletal: No edema, No new rash or lesions	      T(F): 97.8 (10-04-17 @ 09:22), Max: 98.3 (10-03-17 @ 17:24)  HR: 66 (10-04-17 @ 09:22) (64 - 75)  BP: 156/75 (10-04-17 @ 09:22) (109/58 - 156/75)  RR: 16 (10-04-17 @ 09:22) (16 - 18)  SpO2: 97% (10-04-17 @ 09:22) (92% - 98%)  Wt(kg): --    PHYSICAL EXAM:  General: NAD  Respiratory: b/l air entry  Cardiovascular: S1 S2  Gastrointestinal: soft  Extremities: no Edema        10-04    140  |  104  |  44<H>  ----------------------------<  93  4.2   |  28  |  1.74<H>    Ca    8.8      04 Oct 2017 07:42                            11.6   9.2   )-----------( 110      ( 04 Oct 2017 07:42 )             35.2       Hematocrit: 35.2 % (10-04 @ 07:42)  Hemoglobin: 11.6 g/dL (10-04 @ 07:42)  Calcium, Total Serum: 8.8 mg/dL (10-04 @ 07:42)  Potassium, Serum: 4.2 mmol/L (10-04 @ 07:42)      Creatinine, Serum: 1.74 (10-04 @ 07:42)  Creatinine, Serum: 1.41 (10-02 @ 07:05)    < from: US Renal (10.01.17 @ 14:05) >  EXAM:  US KIDNEY(S)                                  PROCEDURE DATE:  10/01/2017          INTERPRETATION:  History: Elevated BUN/creatinine.    Renal ultrasound.    Limited study. Left kidney could not be visualized due to difficulty in   positioning, as per technologist note.  Right kidney relatively small measuring 7.8 cm and demonstrates diffuse   increased cortical echogenicity consistent with chronic medical renal   disease. No hydronephrosis calculus or renal mass on the right.  Incidental bilateral pleural effusions    Impression:   Limited. Left kidney not visualized.  Chronic medical renal disease right kidney.  No right hydronephrosis.    < end of copied text >                          I&O's Detail    03 Oct 2017 07:01  -  04 Oct 2017 07:00  --------------------------------------------------------  IN:    IV PiggyBack: 100 mL    Oral Fluid: 1160 mL  Total IN: 1260 mL    OUT:  Total OUT: 0 mL    Total NET: 1260 mL      04 Oct 2017 07:01  -  04 Oct 2017 10:51  --------------------------------------------------------  IN:    Oral Fluid: 250 mL  Total IN: 250 mL    OUT:  Total OUT: 0 mL    Total NET: 250 mL

## 2017-10-04 NOTE — PROGRESS NOTE ADULT - ASSESSMENT
89 yr old with PMH of Dementia,depression,severe aortic stenosis, chf, CVA- haemorruagic stroke in 2005,severe GI bleed 4 yrs back,pleural effusion, gait disbility following CVA,possible parkinsonafter CVa,hypothyroid, lactose intolerant,allergic to penicillin and contrast dye,recently treated with levofloxacin for possible lung infection, sent from assisted living for SOB, as per son pt does have intermittent episodes of SOB, and is usually better if stays upright, and possible also chokes on food, loosing weight , takes puree diet , no fever, no loss of appetite, ambulates with walker , no bowel complaint,    history obtained from SON who is a podiatry  Pt has living will is full code at  present  admitted with SOB has elevated pro BNP, with b/l pleural effusion, most likely acute exacerbation of CHF diastolic with aortic insufficiency,h/o CVA, frailitity, ambulatory disabilty , with possible dysphagia, arf, has ESBL UTI, received invanz one dose awaiting ID consult, echo b/l atrial enlargement with severe aortic incompetence

## 2017-10-04 NOTE — PROGRESS NOTE ADULT - SUBJECTIVE AND OBJECTIVE BOX
Patient is a 89y Female with a known history of :  ESBL (extended spectrum beta-lactamase) producing bacteria infection (A49.9)  COPD (chronic obstructive pulmonary disease) (J44.9)  CLL (chronic lymphocytic leukemia) (C91.10)  Aortic stenosis (I35.0)  Coronary artery disease (I25.10)  Hypothyroidism (E03.9)  Pleural effusion (J90)  Dysphagia, unspecified type (R13.10)  Gastroesophageal reflux disease without esophagitis (K21.9)  Hypothyroidism, unspecified type (E03.9)  Chronic obstructive pulmonary disease, unspecified COPD type (J44.9)  Dementia with behavioral disturbance, unspecified dementia type (F03.91)  Congestive heart failure, unspecified congestive heart failure chronicity, unspecified congestive heart failure type (I50.9)    HPI:  89 yr old with PMH of Dementia,depression,severe aortic stenosis, chf, CVA- haemorruagic stroke in 2005,severe GI bleed 4 yrs back,pleural effusion, gait disbility following CVA,possible parkinsonafter CVa,hypothyroid, lactose intolerant,allergic to penicillin and contrast dye,recently treated with levofloxacin for possible lung infection, sent from assisted living for SOB, as per son pt does have intermittent episodes of SOB, and is usually better if stays upright, and possible also chokes on food, loosing weight , takes puree diet , no fever, no loss of appetite, ambulates with walker , no bowel complaint,    history obtained from SON who is a podiatry  Pt has living will is full code at  present (01 Oct 2017 10:07)        MEDICATIONS  (STANDING):  buPROPion XL . 150 milliGRAM(s) Oral daily  carbidopa/levodopa  25/100 1 Tablet(s) Oral daily  carvedilol 3.125 milliGRAM(s) Oral every 12 hours  enalapril 10 milliGRAM(s) Oral daily  ertapenem  IVPB 1000 milliGRAM(s) IV Intermittent once  fluticasone propionate 50 MICROgram(s)/spray Nasal Spray 1 Spray(s) Both Nostrils daily  furosemide    Tablet 20 milliGRAM(s) Oral daily  influenza   Vaccine 0.5 milliLiter(s) IntraMuscular once  isosorbide   dinitrate Tablet (ISORDIL) 30 milliGRAM(s) Oral daily  levothyroxine 75 MICROGram(s) Oral daily  multivitamin 1 Tablet(s) Oral daily  pantoprazole    Tablet 40 milliGRAM(s) Oral before breakfast  potassium chloride    Tablet ER 10 milliEquivalent(s) Oral daily    MEDICATIONS  (PRN):  acetaminophen   Tablet 650 milliGRAM(s) Oral every 6 hours PRN moderate pain 4-6  ALBUTerol    0.083% 2.5 milliGRAM(s) Nebulizer every 6 hours PRN Shortness of Breath and/or Wheezing      ALLERGIES: IV Contrast (Unknown)  penicillin (Unknown)      FAMILY HISTORY:      Social history:  Alochol:   Smoking:   Drug Use:   Marital Status:     PHYSICAL EXAMINATION:  -----------------------------  T(C): 36.7 (10-04-17 @ 05:37), Max: 36.8 (10-03-17 @ 17:24)  HR: 65 (10-04-17 @ 05:37) (64 - 75)  BP: 155/75 (10-04-17 @ 05:37) (109/58 - 155/75)  RR: 18 (10-04-17 @ 05:37) (18 - 18)  SpO2: 95% (10-04-17 @ 05:37) (92% - 98%)  Wt(kg): --    10-03 @ 07:01  -  10-04 @ 07:00  --------------------------------------------------------  IN:    IV PiggyBack: 100 mL    Oral Fluid: 1160 mL  Total IN: 1260 mL    OUT:  Total OUT: 0 mL    Total NET: 1260 mL            Constitutional: frail  Eyes: the conjunctiva exhibited no abnormalities and the eyelids demonstrated no xanthelasmas.   HEENT: normal oral mucosa, no oral pallor and no oral cyanosis.   Neck: normal jugular venous A waves present, normal jugular venous V waves present and no jugular venous leong A waves.   Pulmonary: no respiratory distress, normal respiratory rhythm and effort, no accessory muscle use and lungs were clear to auscultation bilaterally.   Cardiovascular: heart rate and rhythm were normal, normal S1 and S2 distant  Abdomen: soft, non-tender, no hepato-splenomegaly and no abdominal mass palpated.   Musculoskeletal: the gait could not be assessed..   Extremities: no clubbing of the fingernails, no localized cyanosis, no petechial hemorrhages and no ischemic changes.   Skin: normal skin color and pigmentation, no rash, no venous stasis, no skin lesions, no skin ulcer and no xanthoma was observed.   Psychiatric: confused    LABS:   --------    10/4/2017:  On Tele = NSR minimal ectopy.  Echo demonstrated LVH with a good EF, moderate Aortic insufficiency, no Aortic stenosis, no pulmonary hypertension. Large pleural effusion seen.                         Radiology:

## 2017-10-04 NOTE — PROGRESS NOTE ADULT - SUBJECTIVE AND OBJECTIVE BOX
Patient is a 89y old  Female who presents with a chief complaint of Dysnea (01 Oct 2017 13:50)      INTERVAL HPI/OVERNIGHT EVENTS:c/o sob    MEDICATIONS  (STANDING):  buPROPion XL . 150 milliGRAM(s) Oral daily  carbidopa/levodopa  25/100 1 Tablet(s) Oral daily  carvedilol 3.125 milliGRAM(s) Oral every 12 hours  enalapril 10 milliGRAM(s) Oral daily  fluticasone propionate 50 MICROgram(s)/spray Nasal Spray 1 Spray(s) Both Nostrils daily  furosemide    Tablet 20 milliGRAM(s) Oral daily  influenza   Vaccine 0.5 milliLiter(s) IntraMuscular once  isosorbide   dinitrate Tablet (ISORDIL) 30 milliGRAM(s) Oral daily  levothyroxine 75 MICROGram(s) Oral daily  multivitamin 1 Tablet(s) Oral daily  pantoprazole    Tablet 40 milliGRAM(s) Oral before breakfast  potassium chloride    Tablet ER 10 milliEquivalent(s) Oral daily    MEDICATIONS  (PRN):  acetaminophen   Tablet 650 milliGRAM(s) Oral every 6 hours PRN moderate pain 4-6  ALBUTerol    0.083% 2.5 milliGRAM(s) Nebulizer every 6 hours PRN Shortness of Breath and/or Wheezing      Allergies    IV Contrast (Unknown)  penicillin (Unknown)    Intolerances          Vital Signs Last 24 Hrs  T(C): 36.6 (04 Oct 2017 09:22), Max: 36.8 (03 Oct 2017 17:24)  T(F): 97.8 (04 Oct 2017 09:22), Max: 98.3 (03 Oct 2017 17:24)  HR: 66 (04 Oct 2017 09:22) (64 - 75)  BP: 156/75 (04 Oct 2017 09:22) (109/58 - 156/75)  BP(mean): --  RR: 16 (04 Oct 2017 09:22) (16 - 18)  SpO2: 97% (04 Oct 2017 09:22) (92% - 98%)    LABS:                        11.6   9.2   )-----------( 110      ( 04 Oct 2017 07:42 )             35.2     10-04    140  |  104  |  44<H>  ----------------------------<  93  4.2   |  28  |  1.74<H>    Ca    8.8      04 Oct 2017 07:42          CAPILLARY BLOOD GLUCOSE              10-03 @ 07:01  -  10-04 @ 07:00  --------------------------------------------------------  IN: 1260 mL / OUT: 0 mL / NET: 1260 mL    10-04 @ 07:01  -  10-04 @ 10:21  --------------------------------------------------------  IN: 250 mL / OUT: 0 mL / NET: 250 mL  < from: US Transthoracic Echocardiogram w/Doppler Complete (10.02.17 @ 09:05) >  Measurements:   A root3.0 cm,LA 5.9cm, LVEDD 4.5cm,2.6cm, septal wall   thickness 1.3 cm, posterior wall thickness 1.3 cm   AV velocity 1,7   m/sec, MV velocity 1.1 m /sec  EF 65%    Mitral Valve: mild mitral annular calcification .thickened mitral valve   with moderate regurgitation ,  Aortic Valve:Thickened aortic valve with moderate to  severe   regurgitation   Left Atrium:  severely enlarged  Left Ventricle: Normal size , moderate left ventricular hypertrophy with   normal EF   ,  Pericardium: Trace pericardial effusion   Tricuspid Valve: Appears normal with mild regurgitation with RVSp 31  mm   hg   Pulmonic Valve: appears normal with mild regurgitation   Right Ventricle: Normal size with normal systolic function   Right Atrium: enlarged    Large pleural effusion noted     SUMMARY:  1. Left ventricular hypertrophy with normal EF  2. Biatrial enlargement   3. aortic sclerosis moderate to  severe AI   4. mild Tricuspid regurgitation with RVSP 31 mm hg .  5. Moderate mitral regurgitation.              < end of copied text >          RADIOLOGY & ADDITIONAL TESTS:    Imaging Personally Reviewed:  [x ] YES  [ ] NO    Consultant(s) Notes Reviewed:  [x ] YES  [ ] NO    Care Discussed with Consultants/Other Providers [x ] YES  [ ] NO

## 2017-10-04 NOTE — PROGRESS NOTE ADULT - ASSESSMENT
89 year old female, brought to ER for episodes of SOB.  Echo demonstrates LVH with moderate AI and no AS, no pulmonary hypertension. Chronic pleural effusions.

## 2017-10-04 NOTE — CONSULT NOTE ADULT - ASSESSMENT
·	CHAVO: On diuresis and ACEI rx  ·	Hypertension  ·	CHF    Will lower ACEI dose. Add hydralazine for BP control. Sono results noted. Small / atrophic kidney. One not visualized. Cardiology follow up. Monitor BP trend. Titrate BP meds as needed. Salt restriction. Will follow electrolytes and renal function trend. Further recommendations pending clinical course. Thank you for the courtesy of this referral.

## 2017-10-05 LAB
ANION GAP SERPL CALC-SCNC: 10 MMOL/L — SIGNIFICANT CHANGE UP (ref 5–17)
BUN SERPL-MCNC: 48 MG/DL — HIGH (ref 7–23)
CALCIUM SERPL-MCNC: 8.8 MG/DL — SIGNIFICANT CHANGE UP (ref 8.4–10.5)
CHLORIDE SERPL-SCNC: 103 MMOL/L — SIGNIFICANT CHANGE UP (ref 96–108)
CO2 SERPL-SCNC: 26 MMOL/L — SIGNIFICANT CHANGE UP (ref 22–31)
CREAT SERPL-MCNC: 1.82 MG/DL — HIGH (ref 0.5–1.3)
GLUCOSE SERPL-MCNC: 91 MG/DL — SIGNIFICANT CHANGE UP (ref 70–99)
HCT VFR BLD CALC: 36.2 % — SIGNIFICANT CHANGE UP (ref 34.5–45)
HGB BLD-MCNC: 11 G/DL — LOW (ref 11.5–15.5)
MCHC RBC-ENTMCNC: 28.2 PG — SIGNIFICANT CHANGE UP (ref 27–34)
MCHC RBC-ENTMCNC: 30.3 GM/DL — LOW (ref 32–36)
MCV RBC AUTO: 93.1 FL — SIGNIFICANT CHANGE UP (ref 80–100)
PLATELET # BLD AUTO: 104 K/UL — LOW (ref 150–400)
POTASSIUM SERPL-MCNC: 4.3 MMOL/L — SIGNIFICANT CHANGE UP (ref 3.5–5.3)
POTASSIUM SERPL-SCNC: 4.3 MMOL/L — SIGNIFICANT CHANGE UP (ref 3.5–5.3)
RBC # BLD: 3.88 M/UL — SIGNIFICANT CHANGE UP (ref 3.8–5.2)
RBC # FLD: 14.8 % — HIGH (ref 10.3–14.5)
SODIUM SERPL-SCNC: 139 MMOL/L — SIGNIFICANT CHANGE UP (ref 135–145)
WBC # BLD: 9.3 K/UL — SIGNIFICANT CHANGE UP (ref 3.8–10.5)
WBC # FLD AUTO: 9.3 K/UL — SIGNIFICANT CHANGE UP (ref 3.8–10.5)

## 2017-10-05 RX ADMIN — Medication 25 MILLIGRAM(S): at 22:08

## 2017-10-05 RX ADMIN — Medication 10 MILLIEQUIVALENT(S): at 13:00

## 2017-10-05 RX ADMIN — BUPROPION HYDROCHLORIDE 150 MILLIGRAM(S): 150 TABLET, EXTENDED RELEASE ORAL at 12:59

## 2017-10-05 RX ADMIN — Medication 1 TABLET(S): at 12:59

## 2017-10-05 RX ADMIN — Medication 2.5 MILLIGRAM(S): at 06:14

## 2017-10-05 RX ADMIN — CARVEDILOL PHOSPHATE 3.12 MILLIGRAM(S): 80 CAPSULE, EXTENDED RELEASE ORAL at 17:58

## 2017-10-05 RX ADMIN — Medication 25 MILLIGRAM(S): at 06:14

## 2017-10-05 RX ADMIN — PANTOPRAZOLE SODIUM 40 MILLIGRAM(S): 20 TABLET, DELAYED RELEASE ORAL at 06:14

## 2017-10-05 RX ADMIN — CARVEDILOL PHOSPHATE 3.12 MILLIGRAM(S): 80 CAPSULE, EXTENDED RELEASE ORAL at 06:14

## 2017-10-05 RX ADMIN — Medication 20 MILLIGRAM(S): at 06:14

## 2017-10-05 RX ADMIN — CARBIDOPA AND LEVODOPA 1 TABLET(S): 25; 100 TABLET ORAL at 13:00

## 2017-10-05 RX ADMIN — Medication 75 MICROGRAM(S): at 06:15

## 2017-10-05 RX ADMIN — Medication 25 MILLIGRAM(S): at 13:01

## 2017-10-05 RX ADMIN — ISOSORBIDE DINITRATE 30 MILLIGRAM(S): 5 TABLET ORAL at 13:00

## 2017-10-05 RX ADMIN — Medication 1 SPRAY(S): at 13:03

## 2017-10-05 NOTE — PROGRESS NOTE ADULT - ASSESSMENT
·	CHAVO: On diuresis and ACEI rx  ·	Hypertension  ·	CHF    Rising creatinine trend. On low dose ACEI now. If creatinine stable can be discharged tomorrow. Cardiology follow up. Monitor BP trend. Titrate BP meds as needed. Salt restriction. Will follow electrolytes and renal function trend. Avoid nephrotoxic meds as possible.

## 2017-10-05 NOTE — PROGRESS NOTE ADULT - SUBJECTIVE AND OBJECTIVE BOX
Patient is a 89y old  Female who presents with a chief complaint of Dysnea (01 Oct 2017 13:50)      Patient seen in follow up for CHAVO. Clinically better.     PAST MEDICAL HISTORY:  CLL (chronic lymphocytic leukemia)  Dementia with behavioral disturbance, unspecified dementia type  Gastrointestinal hemorrhage, unspecified gastrointestinal hemorrhage type  Nontraumatic intracerebral hemorrhage, unspecified cerebral location, unspecified laterality  Rhinitis  Anxiety  GERD (gastroesophageal reflux disease)  Hypothyroidism  Coronary artery disease  COPD (chronic obstructive pulmonary disease)  Parkinson's disease    MEDICATIONS  (STANDING):  buPROPion XL . 150 milliGRAM(s) Oral daily  carbidopa/levodopa  25/100 1 Tablet(s) Oral daily  carvedilol 3.125 milliGRAM(s) Oral every 12 hours  enalapril 2.5 milliGRAM(s) Oral daily  fluticasone propionate 50 MICROgram(s)/spray Nasal Spray 1 Spray(s) Both Nostrils daily  furosemide    Tablet 20 milliGRAM(s) Oral daily  hydrALAZINE 25 milliGRAM(s) Oral every 8 hours  influenza   Vaccine 0.5 milliLiter(s) IntraMuscular once  isosorbide   dinitrate Tablet (ISORDIL) 30 milliGRAM(s) Oral daily  levothyroxine 75 MICROGram(s) Oral daily  multivitamin 1 Tablet(s) Oral daily  pantoprazole    Tablet 40 milliGRAM(s) Oral before breakfast  potassium chloride    Tablet ER 10 milliEquivalent(s) Oral daily    MEDICATIONS  (PRN):  acetaminophen   Tablet 650 milliGRAM(s) Oral every 6 hours PRN moderate pain 4-6  ALBUTerol    0.083% 2.5 milliGRAM(s) Nebulizer every 6 hours PRN Shortness of Breath and/or Wheezing    T(C): 36.9 (10-05-17 @ 10:00), Max: 37 (10-05-17 @ 04:40)  HR: 65 (10-05-17 @ 10:00) (63 - 77)  BP: 155/80 (10-05-17 @ 10:00) (134/72 - 179/90)  RR: 22 (10-05-17 @ 10:00) (16 - 22)  SpO2: 95% (10-05-17 @ 10:00) (91% - 99%)  Wt(kg): --  I&O's Detail    04 Oct 2017 07:01  -  05 Oct 2017 07:00  --------------------------------------------------------  IN:    Oral Fluid: 670 mL  Total IN: 670 mL    OUT:  Total OUT: 0 mL    Total NET: 670 mL      05 Oct 2017 07:01  -  05 Oct 2017 12:37  --------------------------------------------------------  IN:    Oral Fluid: 250 mL  Total IN: 250 mL    OUT:  Total OUT: 0 mL    Total NET: 250 mL          PHYSICAL EXAM:  General: NAD  Respiratory: b/l air entry  Cardiovascular: S1 S2  Gastrointestinal: soft  Extremities:  no edema                          11.0   9.3   )-----------( 104      ( 05 Oct 2017 07:46 )             36.2     10-05    139  |  103  |  48<H>  ----------------------------<  91  4.3   |  26  |  1.82<H>    Ca    8.8      05 Oct 2017 07:46                Sodium, Serum: 139 (10-05 @ 07:46)  Sodium, Serum: 140 (10-04 @ 07:42)  Sodium, Serum: 141 (10-02 @ 07:05)    Creatinine, Serum: 1.82 (10-05 @ 07:46)  Creatinine, Serum: 1.74 (10-04 @ 07:42)  Creatinine, Serum: 1.41 (10-02 @ 07:05)    Potassium, Serum: 4.3 (10-05 @ 07:46)  Potassium, Serum: 4.2 (10-04 @ 07:42)  Potassium, Serum: 4.0 (10-02 @ 07:05)    Hemoglobin: 11.0 (10-05 @ 07:46)  Hemoglobin: 11.6 (10-04 @ 07:42)  Hemoglobin: 11.2 (10-02 @ 07:05)

## 2017-10-05 NOTE — PROGRESS NOTE ADULT - SUBJECTIVE AND OBJECTIVE BOX
Patient is a 89y old  Female who presents with a chief complaint of Dysnea (01 Oct 2017 13:50)      INTERVAL HPI/OVERNIGHT EVENTS:no acute event,     MEDICATIONS  (STANDING):  buPROPion XL . 150 milliGRAM(s) Oral daily  carbidopa/levodopa  25/100 1 Tablet(s) Oral daily  carvedilol 3.125 milliGRAM(s) Oral every 12 hours  enalapril 2.5 milliGRAM(s) Oral daily  fluticasone propionate 50 MICROgram(s)/spray Nasal Spray 1 Spray(s) Both Nostrils daily  furosemide    Tablet 20 milliGRAM(s) Oral daily  hydrALAZINE 25 milliGRAM(s) Oral every 8 hours  influenza   Vaccine 0.5 milliLiter(s) IntraMuscular once  isosorbide   dinitrate Tablet (ISORDIL) 30 milliGRAM(s) Oral daily  levothyroxine 75 MICROGram(s) Oral daily  multivitamin 1 Tablet(s) Oral daily  pantoprazole    Tablet 40 milliGRAM(s) Oral before breakfast  potassium chloride    Tablet ER 10 milliEquivalent(s) Oral daily    MEDICATIONS  (PRN):  acetaminophen   Tablet 650 milliGRAM(s) Oral every 6 hours PRN moderate pain 4-6  ALBUTerol    0.083% 2.5 milliGRAM(s) Nebulizer every 6 hours PRN Shortness of Breath and/or Wheezing      Allergies    IV Contrast (Unknown)  penicillin (Unknown)    Intolerances          Vital Signs Last 24 Hrs  T(C): 36.9 (05 Oct 2017 10:00), Max: 37 (05 Oct 2017 04:40)  T(F): 98.5 (05 Oct 2017 10:00), Max: 98.6 (05 Oct 2017 04:40)  HR: 65 (05 Oct 2017 10:00) (63 - 77)  BP: 155/80 (05 Oct 2017 10:00) (142/53 - 179/90)  BP(mean): --  RR: 22 (05 Oct 2017 10:00) (17 - 22)  SpO2: 95% (05 Oct 2017 10:00) (91% - 99%)    LABS:                        11.0   9.3   )-----------( 104      ( 05 Oct 2017 07:46 )             36.2     10-05    139  |  103  |  48<H>  ----------------------------<  91  4.3   |  26  |  1.82<H>    Ca    8.8      05 Oct 2017 07:46          CAPILLARY BLOOD GLUCOSE              10-04 @ 07:01  -  10-05 @ 07:00  --------------------------------------------------------  IN: 670 mL / OUT: 0 mL / NET: 670 mL    10-05 @ 07:01  -  10-05 @ 11:38  --------------------------------------------------------  IN: 250 mL / OUT: 0 mL / NET: 250 mL          RADIOLOGY & ADDITIONAL TESTS:    Imaging Personally Reviewed:  [x] YES  [ ] NO    Consultant(s) Notes Reviewed:  [ x] YES  [ ] NO    Care Discussed with Consultants/Other Providers [x ] YES  [ ] NO

## 2017-10-05 NOTE — PROGRESS NOTE ADULT - ASSESSMENT
89 yr old with PMH of Dementia,depression,severe aortic stenosis, chf, CVA- haemorruagic stroke in 2005,severe GI bleed 4 yrs back,pleural effusion, gait disbility following CVA,possible parkinsonafter CVa,hypothyroid, lactose intolerant,allergic to penicillin and contrast dye,recently treated with levofloxacin for possible lung infection, sent from assisted living for SOB, as per son pt does have intermittent episodes of SOB, and is usually better if stays upright, and possible also chokes on food, loosing weight , takes puree diet , no fever, no loss of appetite, ambulates with walker , no bowel complaint,    history obtained from SON who is a podiatry  Pt has living will is full code at  present  admitted with SOB has elevated pro BNP, with b/l pleural effusion, most likely acute exacerbation of CHF diastolic with aortic insufficiency,h/o CVA, frailitity, ambulatory disabilty , with possible dysphagia, arf, has ESBL UTI, received invanz one dose awaiting ID consult, echo b/l atrial enlargement with severe aortic incompetence, ARF with diuresis likely, nephro f up

## 2017-10-05 NOTE — PROGRESS NOTE ADULT - SUBJECTIVE AND OBJECTIVE BOX
LATOSHA MOONEY is a 89yFemale , patient examined and chart reviewed.     INTERVAL HPI/ OVERNIGHT EVENTS:   No events. Afebrile.    PAST MEDICAL & SURGICAL HISTORY:  CLL (chronic lymphocytic leukemia)  Dementia with behavioral disturbance, unspecified dementia type  Gastrointestinal hemorrhage, unspecified gastrointestinal hemorrhage type  Nontraumatic intracerebral hemorrhage, unspecified cerebral location, unspecified laterality  Rhinitis  Anxiety  GERD (gastroesophageal reflux disease)  Hypothyroidism  Coronary artery disease  COPD (chronic obstructive pulmonary disease)  Closed fracture of neck of left femur, initial encounter    For details regarding the patient's social history, family history, and other miscellaneous elements, please refer the initial infectious diseases consultation and/or the admitting history and physical examination for this admission.    ROS:  CONSTITUTIONAL:  Negative fever or chills  EYES:  Negative  blurry vision or double vision  CARDIOVASCULAR:  Negative for chest pain or palpitations  RESPIRATORY:  Negative for cough, wheezing, or SOB   GASTROINTESTINAL:  Negative for nausea, vomiting, diarrhea, constipation, or abdominal pain  GENITOURINARY:  Negative frequency, urgency or dysuria  NEUROLOGIC:  No headache, confusion, dizziness, lightheadedness  All other systems were reviewed and are negative     IV Contrast (Unknown)  penicillin (Unknown)      Current inpatient medications :    ANTIBIOTICS/RELEVANT:  NONE    acetaminophen   Tablet 650 milliGRAM(s) Oral every 6 hours PRN  ALBUTerol    0.083% 2.5 milliGRAM(s) Nebulizer every 6 hours PRN  buPROPion XL . 150 milliGRAM(s) Oral daily  carbidopa/levodopa  25/100 1 Tablet(s) Oral daily  carvedilol 3.125 milliGRAM(s) Oral every 12 hours  enalapril 2.5 milliGRAM(s) Oral daily  fluticasone propionate 50 MICROgram(s)/spray Nasal Spray 1 Spray(s) Both Nostrils daily  furosemide    Tablet 20 milliGRAM(s) Oral daily  hydrALAZINE 25 milliGRAM(s) Oral every 8 hours  isosorbide   dinitrate Tablet (ISORDIL) 30 milliGRAM(s) Oral daily  levothyroxine 75 MICROGram(s) Oral daily  multivitamin 1 Tablet(s) Oral daily  pantoprazole    Tablet 40 milliGRAM(s) Oral before breakfast  potassium chloride    Tablet ER 10 milliEquivalent(s) Oral daily      Objective:    10-04 @ 07:01  -  10-05 @ 07:00  --------------------------------------------------------  IN: 670 mL / OUT: 0 mL / NET: 670 mL    10-05 @ 07:01  -  10-05 @ 19:09  --------------------------------------------------------  IN: 670 mL / OUT: 0 mL / NET: 670 mL      T(C): 36.9 (10-05-17 @ 17:16), Max: 37 (10-05-17 @ 04:40)  HR: 67 (10-05-17 @ 17:16) (65 - 77)  BP: 164/64 (10-05-17 @ 17:16) (150/63 - 179/90)  RR: 17 (10-05-17 @ 17:16) (17 - 22)  SpO2: 96% (10-05-17 @ 17:16) (95% - 96%)  Wt(kg): --      Physical Exam:  General: No acute distress  Eyes: sclera anicteric, pupils equal and reactive to light  ENMT: buccal mucosa moist, pharynx not injected  Neck: supple, trachea midline  Lungs: Decreased, no wheeze/rhonchi  Cardiovascular: regular rate and rhythm, S1 S2  Abdomen: soft, nontender, no organomegaly present, bowel sounds normal  Neurological: alert and oriented x3, Cranial Nerves II-XII grossly intact  Skin: no increased ecchymosis/petechiae/purpura  Lymph Nodes: no palpable cervical/supraclavicular lymph nodes enlargements  Extremities: no cyanosis/clubbing +trace edema      LABS:                          11.0   9.3   )-----------( 104      ( 05 Oct 2017 07:46 )             36.2       10-05    139  |  103  |  48<H>  ----------------------------<  91  4.3   |  26  |  1.82<H>    Ca    8.8      05 Oct 2017 07:46      MICROBIOLOGY:  Culture - Urine (10.01.17 @ 19:22)    -  Amikacin: S <=8    -  Ampicillin: R >16    -  Ampicillin/Sulbactam: R 8/4    -  Aztreonam: R 16    -  Cefazolin: R >16    -  Cefepime: R >16    -  Cefoxitin: S 8    -  Ceftazidime: R 8    -  Ceftriaxone: R >32    -  Ciprofloxacin: R >2    -  Ertapenem: S <=0.5    -  Gentamicin: S <=1    -  Imipenem: S <=1    -  Levofloxacin: R >4    -  Meropenem: S <=1    -  Nitrofurantoin: S <=32    -  Piperacillin/Tazobactam: R <=8    -  Tobramycin: S <=2    -  Trimethoprim/Sulfamethoxazole: R >2/38    Specimen Source: .Urine Catheterized    Culture Results:   10,000 - 49,000 CFU/mL Escherichia coli ESBL    Organism Identification: Escherichia coli ESBL    Organism: Escherichia coli ESBL    Method Type: ZAINA      Culture - Blood (10.01.17 @ 11:54)    Specimen Source: .Blood Blood    Culture Results:   No growth to date.      Assessment :   89 yr old with PMH of Dementia, severe AS, admitted  with SOB   COPD/CHF exacerbation  Asymptomatic bacteruria with ESBL EColi  No clinical evidence for UTI    Plan :   No indication for antibiotic  Stable from ID standpoint    Continue with present regiment.  Appropriate use of antibiotics and adverse effects reviewed.      > 35 minutes were spent in direct patient care reviewing notes, medications ,labs data/ imaging , discussion with multidisciplinary team.    Thank you for allowing me to participate in care of your patient .    Darien Boateng MD  Infectious Disease  [phone]

## 2017-10-05 NOTE — PROGRESS NOTE ADULT - SUBJECTIVE AND OBJECTIVE BOX
Patient is a 89y Female with a known history of :  Aortic insufficiency (I35.1)  ESBL (extended spectrum beta-lactamase) producing bacteria infection (A49.9)  COPD (chronic obstructive pulmonary disease) (J44.9)  CLL (chronic lymphocytic leukemia) (C91.10)  Aortic stenosis (I35.0)  Coronary artery disease (I25.10)  Hypothyroidism (E03.9)  Pleural effusion (J90)  Dysphagia, unspecified type (R13.10)  Gastroesophageal reflux disease without esophagitis (K21.9)  Hypothyroidism, unspecified type (E03.9)  Chronic obstructive pulmonary disease, unspecified COPD type (J44.9)  Dementia with behavioral disturbance, unspecified dementia type (F03.91)  Congestive heart failure, unspecified congestive heart failure chronicity, unspecified congestive heart failure type (I50.9)    HPI:  89 yr old with PMH of Dementia,depression,severe aortic stenosis, chf, CVA- haemorruagic stroke in 2005,severe GI bleed 4 yrs back,pleural effusion, gait disbility following CVA,possible parkinsonafter CVa,hypothyroid, lactose intolerant,allergic to penicillin and contrast dye,recently treated with levofloxacin for possible lung infection, sent from assisted living for SOB, as per son pt does have intermittent episodes of SOB, and is usually better if stays upright, and possible also chokes on food, loosing weight , takes puree diet , no fever, no loss of appetite, ambulates with walker , no bowel complaint,    history obtained from SON who is a podiatry  Pt has living will is full code at  present (01 Oct 2017 10:07)      REVIEW OF SYSTEMS:    CONSTITUTIONAL: No fever, weight loss, or fatigue  EYES: No eye pain, visual disturbances, or discharge  ENMT:  No difficulty hearing, tinnitus, vertigo; No sinus or throat pain  NECK: No pain or stiffness  BREASTS: No pain, masses, or nipple discharge  RESPIRATORY: No cough, wheezing, chills or hemoptysis; No shortness of breath  CARDIOVASCULAR: No chest pain, palpitations, dizziness, or leg swelling  GASTROINTESTINAL: No abdominal or epigastric pain. No nausea, vomiting, or hematemesis; No diarrhea or constipation. No melena or hematochezia.  GENITOURINARY: No dysuria, frequency, hematuria, or incontinence  NEUROLOGICAL: No headaches, memory loss, loss of strength, numbness, or tremors  SKIN: No itching, burning, rashes, or lesions   LYMPH NODES: No enlarged glands  ENDOCRINE: No heat or cold intolerance; No hair loss  MUSCULOSKELETAL: No joint pain or swelling; No muscle, back, or extremity pain  PSYCHIATRIC: No depression, anxiety, mood swings, or difficulty sleeping  HEME/LYMPH: No easy bruising, or bleeding gums  ALLERGY AND IMMUNOLOGIC: No hives or eczema    MEDICATIONS  (STANDING):  buPROPion XL . 150 milliGRAM(s) Oral daily  carbidopa/levodopa  25/100 1 Tablet(s) Oral daily  carvedilol 3.125 milliGRAM(s) Oral every 12 hours  enalapril 2.5 milliGRAM(s) Oral daily  fluticasone propionate 50 MICROgram(s)/spray Nasal Spray 1 Spray(s) Both Nostrils daily  furosemide    Tablet 20 milliGRAM(s) Oral daily  hydrALAZINE 25 milliGRAM(s) Oral every 8 hours  influenza   Vaccine 0.5 milliLiter(s) IntraMuscular once  isosorbide   dinitrate Tablet (ISORDIL) 30 milliGRAM(s) Oral daily  levothyroxine 75 MICROGram(s) Oral daily  multivitamin 1 Tablet(s) Oral daily  pantoprazole    Tablet 40 milliGRAM(s) Oral before breakfast  potassium chloride    Tablet ER 10 milliEquivalent(s) Oral daily    MEDICATIONS  (PRN):  acetaminophen   Tablet 650 milliGRAM(s) Oral every 6 hours PRN moderate pain 4-6  ALBUTerol    0.083% 2.5 milliGRAM(s) Nebulizer every 6 hours PRN Shortness of Breath and/or Wheezing      ALLERGIES: IV Contrast (Unknown)  penicillin (Unknown)      FAMILY HISTORY:      Social history:  Alochol:   Smoking:   Drug Use:   Marital Status:     PHYSICAL EXAMINATION:  -----------------------------  T(C): 37 (10-05-17 @ 04:40), Max: 37 (10-05-17 @ 04:40)  HR: 67 (10-05-17 @ 04:40) (63 - 77)  BP: 179/90 (10-05-17 @ 04:40) (142/53 - 179/90)  RR: 18 (10-05-17 @ 04:40) (16 - 18)  SpO2: 96% (10-05-17 @ 04:40) (91% - 99%)  Wt(kg): --    10-04 @ 07:01  -  10-05 @ 07:00  --------------------------------------------------------  IN:    Oral Fluid: 670 mL  Total IN: 670 mL    OUT:  Total OUT: 0 mL    Total NET: 670 mL      10-05 @ 07:01  -  10-05 @ 09:12  --------------------------------------------------------  IN:    Oral Fluid: 250 mL  Total IN: 250 mL    OUT:  Total OUT: 0 mL    Total NET: 250 mL            Constitutional: well developed, normal appearance, well groomed, well nourished, no deformities and no acute distress.   Eyes: the conjunctiva exhibited no abnormalities and the eyelids demonstrated no xanthelasmas.   HEENT: normal oral mucosa, no oral pallor and no oral cyanosis.   Neck: normal jugular venous A waves present, normal jugular venous V waves present and no jugular venous leong A waves.   Pulmonary: no respiratory distress, normal respiratory rhythm and effort, no accessory muscle use and lungs were clear to auscultation bilaterally. Anteriorly.  Cardiovascular: heart rate and rhythm were normal, normal S1 and S2 and no murmur, gallop, rub, heave or thrill are present.   Musculoskeletal: the gait could not be assessed..   Extremities: no clubbing of the fingernails, no localized cyanosis, no petechial hemorrhages and no ischemic changes.   Skin: normal skin color and pigmentation, no rash, no venous stasis, no skin lesions, no skin ulcer and no xanthoma was observed.   Psychiatric: oriented to person, place, and time, the affect was normal, the mood was normal and not feeling anxious.     LABS:   --------  10-05    139  |  103  |  48<H>  ----------------------------<  91  4.3   |  26  |  1.82<H>    Ca    8.8      05 Oct 2017 07:46                           11.0   9.3   )-----------( 104      ( 05 Oct 2017 07:46 )             36.2                   Radiology:

## 2017-10-05 NOTE — PROGRESS NOTE ADULT - SUBJECTIVE AND OBJECTIVE BOX
Date/Time Patient Seen:  		  Referring MD:   Data Reviewed	       Patient is a 89y old  Female who presents with a chief complaint of Dysnea (01 Oct 2017 13:50)    in bed  seen and examined  vs and meds reviewed      Subjective/HPI     PAST MEDICAL & SURGICAL HISTORY:  CLL (chronic lymphocytic leukemia)  Dementia with behavioral disturbance, unspecified dementia type  Gastrointestinal hemorrhage, unspecified gastrointestinal hemorrhage type  Nontraumatic intracerebral hemorrhage, unspecified cerebral location, unspecified laterality  Rhinitis  Anxiety  GERD (gastroesophageal reflux disease)  Hypothyroidism  Coronary artery disease  COPD (chronic obstructive pulmonary disease)  Parkinson's disease  Closed fracture of neck of left femur, initial encounter        Medication list         MEDICATIONS  (STANDING):  buPROPion XL . 150 milliGRAM(s) Oral daily  carbidopa/levodopa  25/100 1 Tablet(s) Oral daily  carvedilol 3.125 milliGRAM(s) Oral every 12 hours  enalapril 2.5 milliGRAM(s) Oral daily  fluticasone propionate 50 MICROgram(s)/spray Nasal Spray 1 Spray(s) Both Nostrils daily  furosemide    Tablet 20 milliGRAM(s) Oral daily  hydrALAZINE 25 milliGRAM(s) Oral every 8 hours  influenza   Vaccine 0.5 milliLiter(s) IntraMuscular once  isosorbide   dinitrate Tablet (ISORDIL) 30 milliGRAM(s) Oral daily  levothyroxine 75 MICROGram(s) Oral daily  multivitamin 1 Tablet(s) Oral daily  pantoprazole    Tablet 40 milliGRAM(s) Oral before breakfast  potassium chloride    Tablet ER 10 milliEquivalent(s) Oral daily    MEDICATIONS  (PRN):  acetaminophen   Tablet 650 milliGRAM(s) Oral every 6 hours PRN moderate pain 4-6  ALBUTerol    0.083% 2.5 milliGRAM(s) Nebulizer every 6 hours PRN Shortness of Breath and/or Wheezing         Vitals log        ICU Vital Signs Last 24 Hrs  T(C): 37 (05 Oct 2017 04:40), Max: 37 (05 Oct 2017 04:40)  T(F): 98.6 (05 Oct 2017 04:40), Max: 98.6 (05 Oct 2017 04:40)  HR: 67 (05 Oct 2017 04:40) (63 - 77)  BP: 179/90 (05 Oct 2017 04:40) (142/53 - 179/90)  BP(mean): --  ABP: --  ABP(mean): --  RR: 18 (05 Oct 2017 04:40) (16 - 18)  SpO2: 96% (05 Oct 2017 04:40) (91% - 99%)           Input and Output:  I&O's Detail    03 Oct 2017 07:01  -  04 Oct 2017 07:00  --------------------------------------------------------  IN:    IV PiggyBack: 100 mL    Oral Fluid: 1160 mL  Total IN: 1260 mL    OUT:  Total OUT: 0 mL    Total NET: 1260 mL      04 Oct 2017 07:01  -  05 Oct 2017 06:35  --------------------------------------------------------  IN:    Oral Fluid: 670 mL  Total IN: 670 mL    OUT:  Total OUT: 0 mL    Total NET: 670 mL          Lab Data                        11.6   9.2   )-----------( 110      ( 04 Oct 2017 07:42 )             35.2     10-04    140  |  104  |  44<H>  ----------------------------<  93  4.2   |  28  |  1.74<H>    Ca    8.8      04 Oct 2017 07:42              Review of Systems	      Objective     Physical Examination    head at  heart - s1s2  lungs - dec BS  abd - soft      Pertinent Lab findings & Imaging      Desire:  NO   Adequate UO     I&O's Detail    03 Oct 2017 07:01  -  04 Oct 2017 07:00  --------------------------------------------------------  IN:    IV PiggyBack: 100 mL    Oral Fluid: 1160 mL  Total IN: 1260 mL    OUT:  Total OUT: 0 mL    Total NET: 1260 mL      04 Oct 2017 07:01  -  05 Oct 2017 06:35  --------------------------------------------------------  IN:    Oral Fluid: 670 mL  Total IN: 670 mL    OUT:  Total OUT: 0 mL    Total NET: 670 mL               Discussed with:     Cultures:	        Radiology

## 2017-10-05 NOTE — PROGRESS NOTE ADULT - ASSESSMENT
89 year old female, history dementia, CLL, ?aortic stenosis, pleural effuisons, s/p CVA. Brought to ER for shortness of breath. Chronic? bilateral effusions  noted. Originally given IV Lasix with relief.  Now feeling better.     Echocardiogram compatible with moderate/severe AI.    Mild tricuspid insufficiency and moderate MR.    10/5/17   No complaints offered.  Patient eating breakfast, semi-erect.  No significant arrhythmias noted.  Patient feeling "pretty good."    Plan:  - Continue present therapy.  - Ambulate with assistance if possible.  - Being followed by Pulmonary and Renal.  - D/C planning.

## 2017-10-06 DIAGNOSIS — N18.9 CHRONIC KIDNEY DISEASE, UNSPECIFIED: ICD-10-CM

## 2017-10-06 LAB
CULTURE RESULTS: SIGNIFICANT CHANGE UP
CULTURE RESULTS: SIGNIFICANT CHANGE UP
SPECIMEN SOURCE: SIGNIFICANT CHANGE UP
SPECIMEN SOURCE: SIGNIFICANT CHANGE UP

## 2017-10-06 RX ORDER — HYDRALAZINE HCL 50 MG
50 TABLET ORAL EVERY 8 HOURS
Qty: 0 | Refills: 0 | Status: DISCONTINUED | OUTPATIENT
Start: 2017-10-06 | End: 2017-10-12

## 2017-10-06 RX ADMIN — Medication 75 MICROGRAM(S): at 06:45

## 2017-10-06 RX ADMIN — Medication 1 TABLET(S): at 12:37

## 2017-10-06 RX ADMIN — Medication 10 MILLIEQUIVALENT(S): at 12:37

## 2017-10-06 RX ADMIN — CARVEDILOL PHOSPHATE 3.12 MILLIGRAM(S): 80 CAPSULE, EXTENDED RELEASE ORAL at 06:45

## 2017-10-06 RX ADMIN — CARBIDOPA AND LEVODOPA 1 TABLET(S): 25; 100 TABLET ORAL at 12:37

## 2017-10-06 RX ADMIN — Medication 20 MILLIGRAM(S): at 06:45

## 2017-10-06 RX ADMIN — Medication 25 MILLIGRAM(S): at 06:45

## 2017-10-06 RX ADMIN — BUPROPION HYDROCHLORIDE 150 MILLIGRAM(S): 150 TABLET, EXTENDED RELEASE ORAL at 12:37

## 2017-10-06 RX ADMIN — CARVEDILOL PHOSPHATE 3.12 MILLIGRAM(S): 80 CAPSULE, EXTENDED RELEASE ORAL at 17:48

## 2017-10-06 RX ADMIN — Medication 50 MILLIGRAM(S): at 15:15

## 2017-10-06 RX ADMIN — ISOSORBIDE DINITRATE 30 MILLIGRAM(S): 5 TABLET ORAL at 12:38

## 2017-10-06 RX ADMIN — Medication 2.5 MILLIGRAM(S): at 06:45

## 2017-10-06 RX ADMIN — Medication 1 SPRAY(S): at 12:38

## 2017-10-06 RX ADMIN — PANTOPRAZOLE SODIUM 40 MILLIGRAM(S): 20 TABLET, DELAYED RELEASE ORAL at 06:45

## 2017-10-06 RX ADMIN — Medication 50 MILLIGRAM(S): at 21:52

## 2017-10-06 NOTE — PROGRESS NOTE ADULT - ASSESSMENT
The patient is a 89 year old female with a history of HTN, CVA, CLL, dementia who is admitted with shortness of breath.    Plan:  - Continue furosemide for valvular disease / heart failure  - Continue carvedilol and hydralazine for HTN  - On isordil for ?CAD  - Discharge planning

## 2017-10-06 NOTE — PROGRESS NOTE ADULT - ASSESSMENT
89 yr old with PMH of Dementia,depression,severe aortic stenosis, chf, CVA- haemorruagic stroke in 2005,severe GI bleed 4 yrs back,pleural effusion, gait disbility following CVA,possible parkinsonafter CVa,hypothyroid, lactose intolerant,allergic to penicillin and contrast dye,recently treated with levofloxacin for possible lung infection, sent from assisted living for SOB, as per son pt does have intermittent episodes of SOB, and is usually better if stays upright, and possible also chokes on food, loosing weight , takes puree diet , no fever, no loss of appetite, ambulates with walker , no bowel complaint,    history obtained from SON who is a podiatry  Pt has living will is full code at  present  admitted with SOB has elevated pro BNP, with b/l pleural effusion, most likely acute exacerbation of CHF diastolic with aortic insufficiency,h/o CVA, frailitity, ambulatory disabilty , with possible dysphagia, arf, has ESBL UTI, received invanz one dose awaiting ID consult, echo b/l atrial enlargement with severe aortic incompetence, ARF with diuresis likely, nephro f up, hydartion

## 2017-10-06 NOTE — PROGRESS NOTE ADULT - SUBJECTIVE AND OBJECTIVE BOX
Date/Time Patient Seen:  		  Referring MD:   Data Reviewed	       Patient is a 89y old  Female who presents with a chief complaint of Dysnea (01 Oct 2017 13:50)    in bed  seen and examined  vs and meds reviewed        Subjective/HPI     PAST MEDICAL & SURGICAL HISTORY:  CLL (chronic lymphocytic leukemia)  Dementia with behavioral disturbance, unspecified dementia type  Gastrointestinal hemorrhage, unspecified gastrointestinal hemorrhage type  Nontraumatic intracerebral hemorrhage, unspecified cerebral location, unspecified laterality  Rhinitis  Anxiety  GERD (gastroesophageal reflux disease)  Hypothyroidism  Coronary artery disease  COPD (chronic obstructive pulmonary disease)  Parkinson's disease  Closed fracture of neck of left femur, initial encounter        Medication list         MEDICATIONS  (STANDING):  buPROPion XL . 150 milliGRAM(s) Oral daily  carbidopa/levodopa  25/100 1 Tablet(s) Oral daily  carvedilol 3.125 milliGRAM(s) Oral every 12 hours  enalapril 2.5 milliGRAM(s) Oral daily  fluticasone propionate 50 MICROgram(s)/spray Nasal Spray 1 Spray(s) Both Nostrils daily  furosemide    Tablet 20 milliGRAM(s) Oral daily  hydrALAZINE 25 milliGRAM(s) Oral every 8 hours  influenza   Vaccine 0.5 milliLiter(s) IntraMuscular once  isosorbide   dinitrate Tablet (ISORDIL) 30 milliGRAM(s) Oral daily  levothyroxine 75 MICROGram(s) Oral daily  multivitamin 1 Tablet(s) Oral daily  pantoprazole    Tablet 40 milliGRAM(s) Oral before breakfast  potassium chloride    Tablet ER 10 milliEquivalent(s) Oral daily    MEDICATIONS  (PRN):  acetaminophen   Tablet 650 milliGRAM(s) Oral every 6 hours PRN moderate pain 4-6  ALBUTerol    0.083% 2.5 milliGRAM(s) Nebulizer every 6 hours PRN Shortness of Breath and/or Wheezing         Vitals log        ICU Vital Signs Last 24 Hrs  T(C): 36.3 (06 Oct 2017 05:58), Max: 36.9 (05 Oct 2017 10:00)  T(F): 97.4 (06 Oct 2017 05:58), Max: 98.5 (05 Oct 2017 10:00)  HR: 63 (06 Oct 2017 05:58) (63 - 77)  BP: 167/83 (06 Oct 2017 05:58) (150/63 - 167/83)  BP(mean): --  ABP: --  ABP(mean): --  RR: 18 (06 Oct 2017 05:58) (17 - 22)  SpO2: 95% (06 Oct 2017 05:58) (94% - 96%)           Input and Output:  I&O's Detail    04 Oct 2017 07:01  -  05 Oct 2017 07:00  --------------------------------------------------------  IN:    Oral Fluid: 670 mL  Total IN: 670 mL    OUT:  Total OUT: 0 mL    Total NET: 670 mL      05 Oct 2017 07:01  -  06 Oct 2017 06:34  --------------------------------------------------------  IN:    Oral Fluid: 670 mL  Total IN: 670 mL    OUT:  Total OUT: 0 mL    Total NET: 670 mL          Lab Data                        11.0   9.3   )-----------( 104      ( 05 Oct 2017 07:46 )             36.2     10-05    139  |  103  |  48<H>  ----------------------------<  91  4.3   |  26  |  1.82<H>    Ca    8.8      05 Oct 2017 07:46              Review of Systems	      Objective     Physical Examination    head at  heart - s1s2  lungs - dec BS      Pertinent Lab findings & Imaging      Desire:  NO   Adequate UO     I&O's Detail    04 Oct 2017 07:01  -  05 Oct 2017 07:00  --------------------------------------------------------  IN:    Oral Fluid: 670 mL  Total IN: 670 mL    OUT:  Total OUT: 0 mL    Total NET: 670 mL      05 Oct 2017 07:01  -  06 Oct 2017 06:34  --------------------------------------------------------  IN:    Oral Fluid: 670 mL  Total IN: 670 mL    OUT:  Total OUT: 0 mL    Total NET: 670 mL               Discussed with:     Cultures:	        Radiology

## 2017-10-06 NOTE — PROGRESS NOTE ADULT - SUBJECTIVE AND OBJECTIVE BOX
LATOSHA MOONEY is a 89yFemale , patient examined and chart reviewed.     INTERVAL HPI/ OVERNIGHT EVENTS:   No events. Afebrile.  Doing well.    PAST MEDICAL & SURGICAL HISTORY:  CLL (chronic lymphocytic leukemia)  Dementia with behavioral disturbance, unspecified dementia type  Gastrointestinal hemorrhage, unspecified gastrointestinal hemorrhage type  Nontraumatic intracerebral hemorrhage, unspecified cerebral location, unspecified laterality  Rhinitis  Anxiety  GERD (gastroesophageal reflux disease)  Hypothyroidism  Coronary artery disease  COPD (chronic obstructive pulmonary disease)  Closed fracture of neck of left femur, initial encounter    For details regarding the patient's social history, family history, and other miscellaneous elements, please refer the initial infectious diseases consultation and/or the admitting history and physical examination for this admission.    ROS:  CONSTITUTIONAL:  Negative fever or chills  EYES:  Negative  blurry vision or double vision  CARDIOVASCULAR:  Negative for chest pain or palpitations  RESPIRATORY:  Negative for cough, wheezing, or SOB   GASTROINTESTINAL:  Negative for nausea, vomiting, diarrhea, constipation, or abdominal pain  GENITOURINARY:  Negative frequency, urgency or dysuria  NEUROLOGIC:  No headache, confusion, dizziness, lightheadedness  All other systems were reviewed and are negative     IV Contrast (Unknown)  penicillin (Unknown)      Current inpatient medications :  MEDICATIONS  (STANDING):  buPROPion XL . 150 milliGRAM(s) Oral daily  carbidopa/levodopa  25/100 1 Tablet(s) Oral daily  carvedilol 3.125 milliGRAM(s) Oral every 12 hours  fluticasone propionate 50 MICROgram(s)/spray Nasal Spray 1 Spray(s) Both Nostrils daily  furosemide    Tablet 20 milliGRAM(s) Oral daily  hydrALAZINE 50 milliGRAM(s) Oral every 8 hours  influenza   Vaccine 0.5 milliLiter(s) IntraMuscular once  isosorbide   dinitrate Tablet (ISORDIL) 30 milliGRAM(s) Oral daily  levothyroxine 75 MICROGram(s) Oral daily  multivitamin 1 Tablet(s) Oral daily  pantoprazole    Tablet 40 milliGRAM(s) Oral before breakfast  potassium chloride    Tablet ER 10 milliEquivalent(s) Oral daily    MEDICATIONS  (PRN):  acetaminophen   Tablet 650 milliGRAM(s) Oral every 6 hours PRN moderate pain 4-6  ALBUTerol    0.083% 2.5 milliGRAM(s) Nebulizer every 6 hours PRN Shortness of Breath and/or Wheezing        Objective:  I&O's Summary    05 Oct 2017 07:01  -  06 Oct 2017 07:00  --------------------------------------------------------  IN: 670 mL / OUT: 0 mL / NET: 670 mL      Vital Signs Last 24 Hrs  T(C): 36.5 (06 Oct 2017 17:06), Max: 36.9 (05 Oct 2017 21:15)  T(F): 97.7 (06 Oct 2017 17:06), Max: 98.4 (05 Oct 2017 21:15)  HR: 66 (06 Oct 2017 17:06) (63 - 69)  BP: 120/69 (06 Oct 2017 17:06) (117/59 - 167/83)  BP(mean): --  RR: 18 (06 Oct 2017 17:06) (16 - 24)  SpO2: 93% (06 Oct 2017 17:06) (91% - 95%)    Physical Exam:  General: No acute distress  Eyes: sclera anicteric, pupils equal and reactive to light  ENMT: buccal mucosa moist, pharynx not injected  Neck: supple, trachea midline  Lungs: Decreased, no wheeze/rhonchi  Cardiovascular: regular rate and rhythm, S1 S2  Abdomen: soft, nontender, no organomegaly present, bowel sounds normal  Neurological: alert and oriented x3, Cranial Nerves II-XII grossly intact  Skin: no increased ecchymosis/petechiae/purpura  Lymph Nodes: no palpable cervical/supraclavicular lymph nodes enlargements  Extremities: no cyanosis/clubbing +trace edema      LABS:                          11.0   9.3   )-----------( 104      ( 05 Oct 2017 07:46 )             36.2       10-05    139  |  103  |  48<H>  ----------------------------<  91  4.3   |  26  |  1.82<H>    Ca    8.8      05 Oct 2017 07:46      MICROBIOLOGY:  Culture - Urine (10.01.17 @ 19:22)    -  Amikacin: S <=8    -  Ampicillin: R >16    -  Ampicillin/Sulbactam: R 8/4    -  Aztreonam: R 16    -  Cefazolin: R >16    -  Cefepime: R >16    -  Cefoxitin: S 8    -  Ceftazidime: R 8    -  Ceftriaxone: R >32    -  Ciprofloxacin: R >2    -  Ertapenem: S <=0.5    -  Gentamicin: S <=1    -  Imipenem: S <=1    -  Levofloxacin: R >4    -  Meropenem: S <=1    -  Nitrofurantoin: S <=32    -  Piperacillin/Tazobactam: R <=8    -  Tobramycin: S <=2    -  Trimethoprim/Sulfamethoxazole: R >2/38    Specimen Source: .Urine Catheterized    Culture Results:   10,000 - 49,000 CFU/mL Escherichia coli ESBL    Organism Identification: Escherichia coli ESBL    Organism: Escherichia coli ESBL    Method Type: ZAINA      Culture - Blood (10.01.17 @ 11:54)    Specimen Source: .Blood Blood    Culture Results:   No growth to date.      Assessment :   89 yr old with PMH of Dementia, severe AS, admitted with SOB   COPD/CHF exacerbation  Asymptomatic bacteruria with ESBL EColi  No clinical evidence for UTI    Plan :   No indication for antibiotic  Stable from ID standpoint    Continue with present regiment.  Appropriate use of antibiotics and adverse effects reviewed.    > 25 minutes were spent in direct patient care reviewing notes, medications ,labs data/ imaging , discussion with multidisciplinary team.    Thank you for allowing me to participate in care of your patient .    Darien Boateng MD  Infectious Disease  [phone]

## 2017-10-06 NOTE — PROGRESS NOTE ADULT - SUBJECTIVE AND OBJECTIVE BOX
Patient is a 89y old  Female who presents with a chief complaint of Dysnea (01 Oct 2017 13:50)      INTERVAL HPI/OVERNIGHT EVENTS:  no new complaint    MEDICATIONS  (STANDING):  buPROPion XL . 150 milliGRAM(s) Oral daily  carbidopa/levodopa  25/100 1 Tablet(s) Oral daily  carvedilol 3.125 milliGRAM(s) Oral every 12 hours  enalapril 2.5 milliGRAM(s) Oral daily  fluticasone propionate 50 MICROgram(s)/spray Nasal Spray 1 Spray(s) Both Nostrils daily  furosemide    Tablet 20 milliGRAM(s) Oral daily  hydrALAZINE 25 milliGRAM(s) Oral every 8 hours  influenza   Vaccine 0.5 milliLiter(s) IntraMuscular once  isosorbide   dinitrate Tablet (ISORDIL) 30 milliGRAM(s) Oral daily  levothyroxine 75 MICROGram(s) Oral daily  multivitamin 1 Tablet(s) Oral daily  pantoprazole    Tablet 40 milliGRAM(s) Oral before breakfast  potassium chloride    Tablet ER 10 milliEquivalent(s) Oral daily    MEDICATIONS  (PRN):  acetaminophen   Tablet 650 milliGRAM(s) Oral every 6 hours PRN moderate pain 4-6  ALBUTerol    0.083% 2.5 milliGRAM(s) Nebulizer every 6 hours PRN Shortness of Breath and/or Wheezing      Allergies    IV Contrast (Unknown)  penicillin (Unknown)    Intolerances          Vital Signs Last 24 Hrs  T(C): 36.5 (06 Oct 2017 09:48), Max: 36.9 (05 Oct 2017 17:16)  T(F): 97.7 (06 Oct 2017 09:48), Max: 98.5 (05 Oct 2017 17:16)  HR: 67 (06 Oct 2017 09:48) (63 - 77)  BP: 156/74 (06 Oct 2017 09:48) (150/63 - 167/83)  BP(mean): --  RR: 16 (06 Oct 2017 09:48) (16 - 18)  SpO2: 92% (06 Oct 2017 09:48) (92% - 96%)    LABS:                        11.0   9.3   )-----------( 104      ( 05 Oct 2017 07:46 )             36.2     10-05    139  |  103  |  48<H>  ----------------------------<  91  4.3   |  26  |  1.82<H>    Ca    8.8      05 Oct 2017 07:46          CAPILLARY BLOOD GLUCOSE              10-05 @ 07:01  -  10-06 @ 07:00  --------------------------------------------------------  IN: 670 mL / OUT: 0 mL / NET: 670 mL          RADIOLOGY & ADDITIONAL TESTS:    Imaging Personally Reviewed:  [x ] YES  [ ] NO    Consultant(s) Notes Reviewed:  [x ] YES  [ ] NO    Care Discussed with Consultants/Other Providers [x ] YES  [ ] NO

## 2017-10-06 NOTE — PROGRESS NOTE ADULT - ASSESSMENT
·	CHAVO: On diuresis and ACEI rx  ·	Hypertension  ·	CHF    Rising creatinine trend. Will d/c ACEI. Increase hydralazine dose. Discharge planning If creatinine stable. Cardiology follow up. Monitor BP trend. Titrate BP meds as needed. Salt restriction. Will follow electrolytes and renal function trend. Avoid nephrotoxic meds as possible. May reume ACEI as outpt if renal function stable. ·	CHAVO: On diuresis and ACEI rx.  ·	Hypertension  ·	CHF    Rising creatinine trend. Will d/c ACEI. Increase hydralazine dose. Discharge planning If creatinine stable. Cardiology follow up. Monitor BP trend. Titrate BP meds as needed. Salt restriction. Will follow electrolytes and renal function trend. Avoid nephrotoxic meds as possible. May reume ACEI as outpt if renal function stable. Check bladder scan to r/o retention.

## 2017-10-06 NOTE — PROGRESS NOTE ADULT - SUBJECTIVE AND OBJECTIVE BOX
Patient is a 89y old  Female who presents with a chief complaint of Dysnea (01 Oct 2017 13:50)      Patient seen in follow up for CHAVO. Clinically better. Creatinine trending up.     PAST MEDICAL HISTORY:  CLL (chronic lymphocytic leukemia)  Dementia with behavioral disturbance, unspecified dementia type  Gastrointestinal hemorrhage, unspecified gastrointestinal hemorrhage type  Nontraumatic intracerebral hemorrhage, unspecified cerebral location, unspecified laterality  Rhinitis  Anxiety  GERD (gastroesophageal reflux disease)  Hypothyroidism  Coronary artery disease  COPD (chronic obstructive pulmonary disease)  Parkinson's disease    MEDICATIONS  (STANDING):  buPROPion XL . 150 milliGRAM(s) Oral daily  carbidopa/levodopa  25/100 1 Tablet(s) Oral daily  carvedilol 3.125 milliGRAM(s) Oral every 12 hours  fluticasone propionate 50 MICROgram(s)/spray Nasal Spray 1 Spray(s) Both Nostrils daily  furosemide    Tablet 20 milliGRAM(s) Oral daily  hydrALAZINE 50 milliGRAM(s) Oral every 8 hours  influenza   Vaccine 0.5 milliLiter(s) IntraMuscular once  isosorbide   dinitrate Tablet (ISORDIL) 30 milliGRAM(s) Oral daily  levothyroxine 75 MICROGram(s) Oral daily  multivitamin 1 Tablet(s) Oral daily  pantoprazole    Tablet 40 milliGRAM(s) Oral before breakfast  potassium chloride    Tablet ER 10 milliEquivalent(s) Oral daily    MEDICATIONS  (PRN):  acetaminophen   Tablet 650 milliGRAM(s) Oral every 6 hours PRN moderate pain 4-6  ALBUTerol    0.083% 2.5 milliGRAM(s) Nebulizer every 6 hours PRN Shortness of Breath and/or Wheezing    T(C): 36.5 (10-06-17 @ 09:48), Max: 37 (10-05-17 @ 04:40)  HR: 67 (10-06-17 @ 09:48) (63 - 77)  BP: 156/74 (10-06-17 @ 09:48) (142/53 - 179/90)  RR: 16 (10-06-17 @ 09:48)  SpO2: 92% (10-06-17 @ 09:48)  Wt(kg): --  I&O's Detail    05 Oct 2017 07:01  -  06 Oct 2017 07:00  --------------------------------------------------------  IN:    Oral Fluid: 670 mL  Total IN: 670 mL    OUT:  Total OUT: 0 mL    Total NET: 670 mL              PHYSICAL EXAM:  General: NAD  Respiratory: b/l air entry  Cardiovascular: S1 S2  Gastrointestinal: soft  Extremities:  no edema                    LABORATORY:                        11.0   9.3   )-----------( 104      ( 05 Oct 2017 07:46 )             36.2     10-05    139  |  103  |  48<H>  ----------------------------<  91  4.3   |  26  |  1.82<H>    Ca    8.8      05 Oct 2017 07:46      Sodium, Serum: 139 mmol/L (10-05 @ 07:46)    Potassium, Serum: 4.3 mmol/L (10-05 @ 07:46)    Hemoglobin: 11.0 g/dL (10-05 @ 07:46)  Hemoglobin: 11.6 g/dL (10-04 @ 07:42)    Creatinine, Serum 1.82 (10-05 @ 07:46)  Creatinine, Serum 1.74 (10-04 @ 07:42)

## 2017-10-06 NOTE — PROGRESS NOTE ADULT - SUBJECTIVE AND OBJECTIVE BOX
Chief Complaint: Shortness of breath    Interval Events: No events overnight    Review of Systems:  General: No fevers, chills, weight loss or gain  Skin: No rashes, color changes  Cardiovascular: No chest pain, orthopnea  Respiratory: No shortness of breath, cough  Gastrointestinal: No nausea, abdominal pain  Genitourinary: No incontinence, pain with urination  Musculoskeletal: No pain, swelling, decreased range of motion  Neurological: No headache, weakness  Psychiatric: No depression, anxiety  Endocrine: No weight loss or gain, increased thirst  All other systems are comprehensively negative.    Physical Exam:  Vitals:        Vital Signs Last 24 Hrs  T(C): 36.5 (06 Oct 2017 09:48), Max: 36.9 (05 Oct 2017 17:16)  T(F): 97.7 (06 Oct 2017 09:48), Max: 98.5 (05 Oct 2017 17:16)  HR: 67 (06 Oct 2017 09:48) (63 - 77)  BP: 156/74 (06 Oct 2017 09:48) (150/63 - 167/83)  BP(mean): --  RR: 16 (06 Oct 2017 09:48) (16 - 18)  SpO2: 92% (06 Oct 2017 09:48) (92% - 96%)  General: NAD  HEENT: MMM  Neck: No JVD, no carotid bruit  Lungs: CTAB  CV: RRR, nl S1/S2, no M/R/G  Abdomen: S/NT/ND, +BS  Extremities: No LE edema, no cyanosis  Neuro: AAOx3, non-focal  Skin: No rash    Labs:                        11.0   9.3   )-----------( 104      ( 05 Oct 2017 07:46 )             36.2     10-05    139  |  103  |  48<H>  ----------------------------<  91  4.3   |  26  |  1.82<H>    Ca    8.8      05 Oct 2017 07:46

## 2017-10-06 NOTE — PROGRESS NOTE ADULT - PROBLEM SELECTOR PLAN 1
nebs PRN  monitor sat  keep satr > 88 pct  optimize cvs regimen  prognosis poor  adv dementia  pt is DNR  dc plan under way

## 2017-10-07 DIAGNOSIS — N17.9 ACUTE KIDNEY FAILURE, UNSPECIFIED: ICD-10-CM

## 2017-10-07 LAB
ANION GAP SERPL CALC-SCNC: 9 MMOL/L — SIGNIFICANT CHANGE UP (ref 5–17)
BUN SERPL-MCNC: 46 MG/DL — HIGH (ref 7–23)
CALCIUM SERPL-MCNC: 9.1 MG/DL — SIGNIFICANT CHANGE UP (ref 8.4–10.5)
CHLORIDE SERPL-SCNC: 103 MMOL/L — SIGNIFICANT CHANGE UP (ref 96–108)
CO2 SERPL-SCNC: 27 MMOL/L — SIGNIFICANT CHANGE UP (ref 22–31)
CREAT SERPL-MCNC: 1.35 MG/DL — HIGH (ref 0.5–1.3)
GLUCOSE SERPL-MCNC: 87 MG/DL — SIGNIFICANT CHANGE UP (ref 70–99)
POTASSIUM SERPL-MCNC: 4.3 MMOL/L — SIGNIFICANT CHANGE UP (ref 3.5–5.3)
POTASSIUM SERPL-SCNC: 4.3 MMOL/L — SIGNIFICANT CHANGE UP (ref 3.5–5.3)
SODIUM SERPL-SCNC: 139 MMOL/L — SIGNIFICANT CHANGE UP (ref 135–145)

## 2017-10-07 RX ADMIN — CARVEDILOL PHOSPHATE 3.12 MILLIGRAM(S): 80 CAPSULE, EXTENDED RELEASE ORAL at 05:44

## 2017-10-07 RX ADMIN — Medication 50 MILLIGRAM(S): at 21:49

## 2017-10-07 RX ADMIN — Medication 1 SPRAY(S): at 12:25

## 2017-10-07 RX ADMIN — CARVEDILOL PHOSPHATE 3.12 MILLIGRAM(S): 80 CAPSULE, EXTENDED RELEASE ORAL at 18:20

## 2017-10-07 RX ADMIN — CARBIDOPA AND LEVODOPA 1 TABLET(S): 25; 100 TABLET ORAL at 12:24

## 2017-10-07 RX ADMIN — Medication 1 TABLET(S): at 13:26

## 2017-10-07 RX ADMIN — Medication 50 MILLIGRAM(S): at 12:24

## 2017-10-07 RX ADMIN — BUPROPION HYDROCHLORIDE 150 MILLIGRAM(S): 150 TABLET, EXTENDED RELEASE ORAL at 12:24

## 2017-10-07 RX ADMIN — Medication 20 MILLIGRAM(S): at 05:45

## 2017-10-07 RX ADMIN — Medication 50 MILLIGRAM(S): at 05:44

## 2017-10-07 RX ADMIN — Medication 75 MICROGRAM(S): at 05:45

## 2017-10-07 RX ADMIN — Medication 10 MILLIEQUIVALENT(S): at 13:26

## 2017-10-07 RX ADMIN — ISOSORBIDE DINITRATE 30 MILLIGRAM(S): 5 TABLET ORAL at 13:27

## 2017-10-07 RX ADMIN — PANTOPRAZOLE SODIUM 40 MILLIGRAM(S): 20 TABLET, DELAYED RELEASE ORAL at 05:47

## 2017-10-07 NOTE — PROGRESS NOTE ADULT - SUBJECTIVE AND OBJECTIVE BOX
Patient is a 89y old  Female who presents with a chief complaint of Dysnea (01 Oct 2017 13:50)      INTERVAL HPI/OVERNIGHT EVENTS:no new complain    MEDICATIONS  (STANDING):  buPROPion XL . 150 milliGRAM(s) Oral daily  carbidopa/levodopa  25/100 1 Tablet(s) Oral daily  carvedilol 3.125 milliGRAM(s) Oral every 12 hours  fluticasone propionate 50 MICROgram(s)/spray Nasal Spray 1 Spray(s) Both Nostrils daily  furosemide    Tablet 20 milliGRAM(s) Oral daily  hydrALAZINE 50 milliGRAM(s) Oral every 8 hours  influenza   Vaccine 0.5 milliLiter(s) IntraMuscular once  isosorbide   dinitrate Tablet (ISORDIL) 30 milliGRAM(s) Oral daily  levothyroxine 75 MICROGram(s) Oral daily  multivitamin 1 Tablet(s) Oral daily  pantoprazole    Tablet 40 milliGRAM(s) Oral before breakfast  potassium chloride    Tablet ER 10 milliEquivalent(s) Oral daily    MEDICATIONS  (PRN):  acetaminophen   Tablet 650 milliGRAM(s) Oral every 6 hours PRN moderate pain 4-6  ALBUTerol    0.083% 2.5 milliGRAM(s) Nebulizer every 6 hours PRN Shortness of Breath and/or Wheezing      Allergies    IV Contrast (Unknown)  penicillin (Unknown)    Intolerances          Vital Signs Last 24 Hrs  T(C): 36.5 (07 Oct 2017 09:31), Max: 36.7 (07 Oct 2017 05:42)  T(F): 97.7 (07 Oct 2017 09:31), Max: 98 (07 Oct 2017 05:42)  HR: 71 (07 Oct 2017 09:31) (59 - 71)  BP: 157/70 (07 Oct 2017 09:31) (117/59 - 159/78)  BP(mean): --  RR: 26 (07 Oct 2017 09:31) (16 - 26)  SpO2: 90% (07 Oct 2017 09:31) (90% - 93%)    LABS:    10-07    139  |  103  |  46<H>  ----------------------------<  87  4.3   |  27  |  1.35<H>    Ca    9.1      07 Oct 2017 07:32          CAPILLARY BLOOD GLUCOSE              10-06 @ 07:01  -  10-07 @ 07:00  --------------------------------------------------------  IN: 120 mL / OUT: 0 mL / NET: 120 mL          RADIOLOGY & ADDITIONAL TESTS:    Imaging Personally Reviewed:  x[ ] YES  [ ] NO    Consultant(s) Notes Reviewed:  [x ] YES  [ ] NO    Care Discussed with Consultants/Other Providers [x ] YES  [ ] NO

## 2017-10-07 NOTE — PROGRESS NOTE ADULT - SUBJECTIVE AND OBJECTIVE BOX
Date/Time Patient Seen:  		  Referring MD:   Data Reviewed	       Patient is a 89y old  Female who presents with a chief complaint of Dysnea (01 Oct 2017 13:50)  in bed  seen and examined  vs and meds reviewed        Subjective/HPI     PAST MEDICAL & SURGICAL HISTORY:  CLL (chronic lymphocytic leukemia)  Dementia with behavioral disturbance, unspecified dementia type  Gastrointestinal hemorrhage, unspecified gastrointestinal hemorrhage type  Nontraumatic intracerebral hemorrhage, unspecified cerebral location, unspecified laterality  Rhinitis  Anxiety  GERD (gastroesophageal reflux disease)  Hypothyroidism  Coronary artery disease  COPD (chronic obstructive pulmonary disease)  Parkinson's disease  Closed fracture of neck of left femur, initial encounter        Medication list         MEDICATIONS  (STANDING):  buPROPion XL . 150 milliGRAM(s) Oral daily  carbidopa/levodopa  25/100 1 Tablet(s) Oral daily  carvedilol 3.125 milliGRAM(s) Oral every 12 hours  fluticasone propionate 50 MICROgram(s)/spray Nasal Spray 1 Spray(s) Both Nostrils daily  furosemide    Tablet 20 milliGRAM(s) Oral daily  hydrALAZINE 50 milliGRAM(s) Oral every 8 hours  influenza   Vaccine 0.5 milliLiter(s) IntraMuscular once  isosorbide   dinitrate Tablet (ISORDIL) 30 milliGRAM(s) Oral daily  levothyroxine 75 MICROGram(s) Oral daily  multivitamin 1 Tablet(s) Oral daily  pantoprazole    Tablet 40 milliGRAM(s) Oral before breakfast  potassium chloride    Tablet ER 10 milliEquivalent(s) Oral daily    MEDICATIONS  (PRN):  acetaminophen   Tablet 650 milliGRAM(s) Oral every 6 hours PRN moderate pain 4-6  ALBUTerol    0.083% 2.5 milliGRAM(s) Nebulizer every 6 hours PRN Shortness of Breath and/or Wheezing         Vitals log        ICU Vital Signs Last 24 Hrs  T(C): 36.7 (07 Oct 2017 05:42), Max: 36.7 (07 Oct 2017 05:42)  T(F): 98 (07 Oct 2017 05:42), Max: 98 (07 Oct 2017 05:42)  HR: 62 (07 Oct 2017 05:42) (59 - 69)  BP: 159/78 (07 Oct 2017 05:42) (117/59 - 159/78)  BP(mean): --  ABP: --  ABP(mean): --  RR: 18 (07 Oct 2017 05:42) (16 - 24)  SpO2: 92% (07 Oct 2017 05:42) (90% - 93%)           Input and Output:  I&O's Detail    05 Oct 2017 07:01  -  06 Oct 2017 07:00  --------------------------------------------------------  IN:    Oral Fluid: 970 mL  Total IN: 970 mL    OUT:  Total OUT: 0 mL    Total NET: 970 mL      06 Oct 2017 07:01  -  07 Oct 2017 06:54  --------------------------------------------------------  IN:    Oral Fluid: 120 mL  Total IN: 120 mL    OUT:  Total OUT: 0 mL    Total NET: 120 mL          Lab Data                        11.0   9.3   )-----------( 104      ( 05 Oct 2017 07:46 )             36.2     10-05    139  |  103  |  48<H>  ----------------------------<  91  4.3   |  26  |  1.82<H>    Ca    8.8      05 Oct 2017 07:46              Review of Systems	      Objective     Physical Examination    head at  heart - s1s2  lungs - dec BS  abd - soft      Pertinent Lab findings & Imaging      Desire:  NO   Adequate UO     I&O's Detail    05 Oct 2017 07:01  -  06 Oct 2017 07:00  --------------------------------------------------------  IN:    Oral Fluid: 970 mL  Total IN: 970 mL    OUT:  Total OUT: 0 mL    Total NET: 970 mL      06 Oct 2017 07:01  -  07 Oct 2017 06:54  --------------------------------------------------------  IN:    Oral Fluid: 120 mL  Total IN: 120 mL    OUT:  Total OUT: 0 mL    Total NET: 120 mL               Discussed with:     Cultures:	        Radiology

## 2017-10-07 NOTE — PROGRESS NOTE ADULT - ASSESSMENT
89 yr old with PMH of Dementia,depression,severe aortic stenosis, chf, CVA- haemorruagic stroke in 2005,severe GI bleed 4 yrs back,pleural effusion, gait disbility following CVA,possible parkinsonafter CVa,hypothyroid, lactose intolerant,allergic to penicillin and contrast dye,recently treated with levofloxacin for possible lung infection, sent from assisted living for SOB, as per son pt does have intermittent episodes of SOB, and is usually better if stays upright, and possible also chokes on food, loosing weight , takes puree diet , no fever, no loss of appetite, ambulates with walker , no bowel complaint,    history obtained from SON who is a podiatry  Pt has living will is full code at  present  admitted with SOB has elevated pro BNP, with b/l pleural effusion, most likely acute exacerbation of CHF diastolic with aortic insufficiency,h/o CVA, frailitity, ambulatory disabilty , with possible dysphagia, arf, has ESBL UTI, received invanz one dose awaiting ID consult, echo b/l atrial enlargement with severe aortic incompetence, ARF with diuresis likely, nephro f up, hydartion to continue, trend bmp

## 2017-10-07 NOTE — PROGRESS NOTE ADULT - ASSESSMENT
89 year old female, history dementia, CLL, ?aortic stenosis, pleural effuisons, s/p CVA. Brought to ER for shortness of breath. Chronic? bilateral effusions  noted. Originally given IV Lasix with relief.  Now feeling better.     Echocardiogram compatible with moderate/severe AI.    Mild tricuspid insufficiency and moderate MR.    10/7/17   Condition essentially unchanged.  No complaints offered.  No significant arrhythmias noted.    Plan:  - Continue present therapy.  - Ambulate with assistance if possible.  - Being followed by Pulmonary and Renal.  - D/C planning.

## 2017-10-07 NOTE — PROGRESS NOTE ADULT - SUBJECTIVE AND OBJECTIVE BOX
Patient is a 89y Female with a known history of :  Acute renal failure, unspecified acute renal failure type (N17.9)  CKD (chronic kidney disease) (N18.9)  Aortic insufficiency (I35.1)  ESBL (extended spectrum beta-lactamase) producing bacteria infection (A49.9)  COPD (chronic obstructive pulmonary disease) (J44.9)  CLL (chronic lymphocytic leukemia) (C91.10)  Aortic stenosis (I35.0)  Coronary artery disease (I25.10)  Hypothyroidism (E03.9)  Pleural effusion (J90)  Dysphagia, unspecified type (R13.10)  Gastroesophageal reflux disease without esophagitis (K21.9)  Hypothyroidism, unspecified type (E03.9)  Chronic obstructive pulmonary disease, unspecified COPD type (J44.9)  Dementia with behavioral disturbance, unspecified dementia type (F03.91)  Congestive heart failure, unspecified congestive heart failure chronicity, unspecified congestive heart failure type (I50.9)    HPI:  89 yr old with PMH of Dementia,depression,severe aortic stenosis, chf, CVA- haemorruagic stroke in 2005,severe GI bleed 4 yrs back,pleural effusion, gait disbility following CVA,possible parkinsonafter CVa,hypothyroid, lactose intolerant,allergic to penicillin and contrast dye,recently treated with levofloxacin for possible lung infection, sent from assisted living for SOB, as per son pt does have intermittent episodes of SOB, and is usually better if stays upright, and possible also chokes on food, loosing weight , takes puree diet , no fever, no loss of appetite, ambulates with walker , no bowel complaint,    history obtained from SON who is a podiatry  Pt has living will is full code at  present (01 Oct 2017 10:07)      REVIEW OF SYSTEMS:    CONSTITUTIONAL: No fever, weight loss, or fatigue  EYES: No eye pain, visual disturbances, or discharge  ENMT:  No difficulty hearing, tinnitus, vertigo; No sinus or throat pain  NECK: No pain or stiffness  BREASTS: No pain, masses, or nipple discharge  RESPIRATORY: No cough, wheezing, chills or hemoptysis; No shortness of breath  CARDIOVASCULAR: No chest pain, palpitations, dizziness, or leg swelling  GASTROINTESTINAL: No abdominal or epigastric pain. No nausea, vomiting, or hematemesis; No diarrhea or constipation. No melena or hematochezia.  GENITOURINARY: No dysuria, frequency, hematuria, or incontinence  NEUROLOGICAL: No headaches, memory loss, loss of strength, numbness, or tremors  SKIN: No itching, burning, rashes, or lesions   LYMPH NODES: No enlarged glands  ENDOCRINE: No heat or cold intolerance; No hair loss  MUSCULOSKELETAL: No joint pain or swelling; No muscle, back, or extremity pain  PSYCHIATRIC: No depression, anxiety, mood swings, or difficulty sleeping  HEME/LYMPH: No easy bruising, or bleeding gums  ALLERGY AND IMMUNOLOGIC: No hives or eczema    MEDICATIONS  (STANDING):  buPROPion XL . 150 milliGRAM(s) Oral daily  carbidopa/levodopa  25/100 1 Tablet(s) Oral daily  carvedilol 3.125 milliGRAM(s) Oral every 12 hours  fluticasone propionate 50 MICROgram(s)/spray Nasal Spray 1 Spray(s) Both Nostrils daily  furosemide    Tablet 20 milliGRAM(s) Oral daily  hydrALAZINE 50 milliGRAM(s) Oral every 8 hours  influenza   Vaccine 0.5 milliLiter(s) IntraMuscular once  isosorbide   dinitrate Tablet (ISORDIL) 30 milliGRAM(s) Oral daily  levothyroxine 75 MICROGram(s) Oral daily  multivitamin 1 Tablet(s) Oral daily  pantoprazole    Tablet 40 milliGRAM(s) Oral before breakfast  potassium chloride    Tablet ER 10 milliEquivalent(s) Oral daily    MEDICATIONS  (PRN):  acetaminophen   Tablet 650 milliGRAM(s) Oral every 6 hours PRN moderate pain 4-6  ALBUTerol    0.083% 2.5 milliGRAM(s) Nebulizer every 6 hours PRN Shortness of Breath and/or Wheezing      ALLERGIES: IV Contrast (Unknown)  penicillin (Unknown)      FAMILY HISTORY:      Social history:  Alochol:   Smoking:   Drug Use:   Marital Status:     PHYSICAL EXAMINATION:  -----------------------------  T(C): 36.5 (10-07-17 @ 09:31), Max: 36.7 (10-07-17 @ 05:42)  HR: 71 (10-07-17 @ 09:31) (59 - 71)  BP: 157/70 (10-07-17 @ 09:31) (117/59 - 159/78)  RR: 26 (10-07-17 @ 09:31) (16 - 26)  SpO2: 90% (10-07-17 @ 09:31) (90% - 93%)  Wt(kg): --    10-06 @ 07:01  -  10-07 @ 07:00  --------------------------------------------------------  IN:    Oral Fluid: 120 mL  Total IN: 120 mL    OUT:  Total OUT: 0 mL    Total NET: 120 mL      10-07 @ 07:01  -  10-07 @ 11:40  --------------------------------------------------------  IN:    Oral Fluid: 420 mL  Total IN: 420 mL    OUT:  Total OUT: 0 mL    Total NET: 420 mL            Constitutional: well developed, normal appearance, well groomed, well nourished, no deformities and no acute distress.   Eyes: the conjunctiva exhibited no abnormalities and the eyelids demonstrated no xanthelasmas.   HEENT: normal oral mucosa, no oral pallor and no oral cyanosis.   Neck: normal jugular venous A waves present, normal jugular venous V waves present and no jugular venous leong A waves.   Pulmonary: no respiratory distress, normal respiratory rhythm and effort, no accessory muscle use and lungs were clear to auscultation bilaterally.   Cardiovascular: heart rate and rhythm were normal, normal S1 and S2 and no murmur, gallop, rub, heave or thrill are present.   Abdomen: soft, non-tender, no hepato-splenomegaly and no abdominal mass palpated.   Musculoskeletal: the gait could not be assessed..   Extremities: no clubbing of the fingernails, no localized cyanosis, no petechial hemorrhages and no ischemic changes.   Skin: normal skin color and pigmentation, no rash, no venous stasis, no skin lesions, no skin ulcer and no xanthoma was observed.   Psychiatric: oriented to person, place, and time, the affect was normal, the mood was normal and not feeling anxious.     LABS:   --------  10-07    139  |  103  |  46<H>  ----------------------------<  87  4.3   |  27  |  1.35<H>    Ca    9.1      07 Oct 2017 07:32                     Radiology:

## 2017-10-07 NOTE — PROGRESS NOTE ADULT - PROBLEM SELECTOR PLAN 1
albuterol PRN  HOB elevation  cvs regimen optimization  keep sat > 88 pct  chest pt as needed  seasonal vaccination  prognosis guarded with advanced age and multiple medical issues  pt is DNR  dc planning

## 2017-10-08 LAB
ANION GAP SERPL CALC-SCNC: 10 MMOL/L — SIGNIFICANT CHANGE UP (ref 5–17)
BUN SERPL-MCNC: 47 MG/DL — HIGH (ref 7–23)
CALCIUM SERPL-MCNC: 8.8 MG/DL — SIGNIFICANT CHANGE UP (ref 8.4–10.5)
CHLORIDE SERPL-SCNC: 104 MMOL/L — SIGNIFICANT CHANGE UP (ref 96–108)
CO2 SERPL-SCNC: 22 MMOL/L — SIGNIFICANT CHANGE UP (ref 22–31)
CREAT SERPL-MCNC: 1.3 MG/DL — SIGNIFICANT CHANGE UP (ref 0.5–1.3)
GLUCOSE SERPL-MCNC: 103 MG/DL — HIGH (ref 70–99)
HCT VFR BLD CALC: 39.9 % — SIGNIFICANT CHANGE UP (ref 34.5–45)
HGB BLD-MCNC: 12.4 G/DL — SIGNIFICANT CHANGE UP (ref 11.5–15.5)
MCHC RBC-ENTMCNC: 28.8 PG — SIGNIFICANT CHANGE UP (ref 27–34)
MCHC RBC-ENTMCNC: 30.9 GM/DL — LOW (ref 32–36)
MCV RBC AUTO: 93.2 FL — SIGNIFICANT CHANGE UP (ref 80–100)
PLATELET # BLD AUTO: 89 K/UL — LOW (ref 150–400)
POTASSIUM SERPL-MCNC: 4.6 MMOL/L — SIGNIFICANT CHANGE UP (ref 3.5–5.3)
POTASSIUM SERPL-SCNC: 4.6 MMOL/L — SIGNIFICANT CHANGE UP (ref 3.5–5.3)
RBC # BLD: 4.29 M/UL — SIGNIFICANT CHANGE UP (ref 3.8–5.2)
RBC # FLD: 14.9 % — HIGH (ref 10.3–14.5)
SODIUM SERPL-SCNC: 136 MMOL/L — SIGNIFICANT CHANGE UP (ref 135–145)
WBC # BLD: 9 K/UL — SIGNIFICANT CHANGE UP (ref 3.8–10.5)
WBC # FLD AUTO: 9 K/UL — SIGNIFICANT CHANGE UP (ref 3.8–10.5)

## 2017-10-08 RX ADMIN — ISOSORBIDE DINITRATE 30 MILLIGRAM(S): 5 TABLET ORAL at 11:12

## 2017-10-08 RX ADMIN — Medication 1 SPRAY(S): at 11:12

## 2017-10-08 RX ADMIN — Medication 20 MILLIGRAM(S): at 06:31

## 2017-10-08 RX ADMIN — Medication 50 MILLIGRAM(S): at 21:58

## 2017-10-08 RX ADMIN — Medication 75 MICROGRAM(S): at 06:30

## 2017-10-08 RX ADMIN — Medication 50 MILLIGRAM(S): at 06:31

## 2017-10-08 RX ADMIN — Medication 50 MILLIGRAM(S): at 14:22

## 2017-10-08 RX ADMIN — Medication 1 TABLET(S): at 11:12

## 2017-10-08 RX ADMIN — Medication 10 MILLIEQUIVALENT(S): at 11:12

## 2017-10-08 RX ADMIN — CARVEDILOL PHOSPHATE 3.12 MILLIGRAM(S): 80 CAPSULE, EXTENDED RELEASE ORAL at 17:11

## 2017-10-08 RX ADMIN — CARBIDOPA AND LEVODOPA 1 TABLET(S): 25; 100 TABLET ORAL at 11:12

## 2017-10-08 RX ADMIN — BUPROPION HYDROCHLORIDE 150 MILLIGRAM(S): 150 TABLET, EXTENDED RELEASE ORAL at 11:12

## 2017-10-08 RX ADMIN — CARVEDILOL PHOSPHATE 3.12 MILLIGRAM(S): 80 CAPSULE, EXTENDED RELEASE ORAL at 06:31

## 2017-10-08 NOTE — PROGRESS NOTE ADULT - SUBJECTIVE AND OBJECTIVE BOX
Patient is a 89y Female with a known history of :  Acute renal failure, unspecified acute renal failure type (N17.9)  CKD (chronic kidney disease) (N18.9)  Aortic insufficiency (I35.1)  ESBL (extended spectrum beta-lactamase) producing bacteria infection (A49.9)  COPD (chronic obstructive pulmonary disease) (J44.9)  CLL (chronic lymphocytic leukemia) (C91.10)  Aortic stenosis (I35.0)  Coronary artery disease (I25.10)  Hypothyroidism (E03.9)  Pleural effusion (J90)  Dysphagia, unspecified type (R13.10)  Gastroesophageal reflux disease without esophagitis (K21.9)  Hypothyroidism, unspecified type (E03.9)  Chronic obstructive pulmonary disease, unspecified COPD type (J44.9)  Dementia with behavioral disturbance, unspecified dementia type (F03.91)  Congestive heart failure, unspecified congestive heart failure chronicity, unspecified congestive heart failure type (I50.9)    HPI:  89 yr old with PMH of Dementia,depression,severe aortic stenosis, chf, CVA- haemorruagic stroke in 2005,severe GI bleed 4 yrs back,pleural effusion, gait disbility following CVA,possible parkinsonafter CVa,hypothyroid, lactose intolerant,allergic to penicillin and contrast dye,recently treated with levofloxacin for possible lung infection, sent from assisted living for SOB, as per son pt does have intermittent episodes of SOB, and is usually better if stays upright, and possible also chokes on food, loosing weight , takes puree diet , no fever, no loss of appetite, ambulates with walker , no bowel complaint,    history obtained from SON who is a podiatry  Pt has living will is full code at  present (01 Oct 2017 10:07)      REVIEW OF SYSTEMS:    CONSTITUTIONAL: No fever, weight loss, or fatigue  EYES: No eye pain, visual disturbances, or discharge  ENMT:  No difficulty hearing, tinnitus, vertigo; No sinus or throat pain  NECK: No pain or stiffness  BREASTS: No pain, masses, or nipple discharge  RESPIRATORY: No cough, wheezing, chills or hemoptysis; No shortness of breath  CARDIOVASCULAR: No chest pain, palpitations, dizziness, or leg swelling  GASTROINTESTINAL: No abdominal or epigastric pain. No nausea, vomiting, or hematemesis; No diarrhea or constipation. No melena or hematochezia.  GENITOURINARY: No dysuria, frequency, hematuria, or incontinence  NEUROLOGICAL: No headaches, memory loss, loss of strength, numbness, or tremors  SKIN: No itching, burning, rashes, or lesions   LYMPH NODES: No enlarged glands  ENDOCRINE: No heat or cold intolerance; No hair loss  MUSCULOSKELETAL: No joint pain or swelling; No muscle, back, or extremity pain  PSYCHIATRIC: No depression, anxiety, mood swings, or difficulty sleeping  HEME/LYMPH: No easy bruising, or bleeding gums  ALLERGY AND IMMUNOLOGIC: No hives or eczema    MEDICATIONS  (STANDING):  buPROPion XL . 150 milliGRAM(s) Oral daily  carbidopa/levodopa  25/100 1 Tablet(s) Oral daily  carvedilol 3.125 milliGRAM(s) Oral every 12 hours  fluticasone propionate 50 MICROgram(s)/spray Nasal Spray 1 Spray(s) Both Nostrils daily  furosemide    Tablet 20 milliGRAM(s) Oral daily  hydrALAZINE 50 milliGRAM(s) Oral every 8 hours  influenza   Vaccine 0.5 milliLiter(s) IntraMuscular once  isosorbide   dinitrate Tablet (ISORDIL) 30 milliGRAM(s) Oral daily  levothyroxine 75 MICROGram(s) Oral daily  multivitamin 1 Tablet(s) Oral daily  pantoprazole    Tablet 40 milliGRAM(s) Oral before breakfast  potassium chloride    Tablet ER 10 milliEquivalent(s) Oral daily    MEDICATIONS  (PRN):  acetaminophen   Tablet 650 milliGRAM(s) Oral every 6 hours PRN moderate pain 4-6  ALBUTerol    0.083% 2.5 milliGRAM(s) Nebulizer every 6 hours PRN Shortness of Breath and/or Wheezing      ALLERGIES: IV Contrast (Unknown)  penicillin (Unknown)      FAMILY HISTORY:      Social history:  Alochol:   Smoking:   Drug Use:   Marital Status:     PHYSICAL EXAMINATION:  -----------------------------  T(C): 36.7 (10-08-17 @ 05:00), Max: 38 (10-07-17 @ 21:45)  HR: 64 (10-08-17 @ 05:00) (64 - 80)  BP: 144/75 (10-08-17 @ 05:00) (124/66 - 151/74)  RR: 18 (10-08-17 @ 05:00) (18 - 18)  SpO2: 97% (10-08-17 @ 05:00) (90% - 97%)  Wt(kg): --    10-07 @ 07:01  -  10-08 @ 07:00  --------------------------------------------------------  IN:    Oral Fluid: 420 mL  Total IN: 420 mL    OUT:  Total OUT: 0 mL    Total NET: 420 mL      10-08 @ 07:01  -  10-08 @ 10:26  --------------------------------------------------------  IN:    Oral Fluid: 420 mL  Total IN: 420 mL    OUT:  Total OUT: 0 mL    Total NET: 420 mL            Constitutional: well developed, normal appearance, well groomed, well nourished, no deformities and no acute distress.   Eyes: the conjunctiva exhibited no abnormalities and the eyelids demonstrated no xanthelasmas.   HEENT: normal oral mucosa, no oral pallor and no oral cyanosis.   Neck: normal jugular venous A waves present, normal jugular venous V waves present and no jugular venous leong A waves.   Pulmonary: no respiratory distress, normal respiratory rhythm and effort, no accessory muscle use and lungs were clear to auscultation bilaterally.   Cardiovascular: heart rate and rhythm were normal, normal S1 and S2 and no murmur, gallop, rub, heave or thrill are present.   Musculoskeletal: the gait could not be assessed.  Extremities: no clubbing of the fingernails, no localized cyanosis, no petechial hemorrhages and no ischemic changes.   Skin: normal skin color and pigmentation, no rash, no venous stasis, no skin lesions, no skin ulcer and no xanthoma was observed.   Psychiatric: oriented to person, place, and time, the affect was normal, the mood was normal and not feeling anxious.     LABS:   --------  10-08    136  |  104  |  47<H>  ----------------------------<  103<H>  4.6   |  22  |  1.30    Ca    8.8      08 Oct 2017 07:46                           12.4   9.0   )-----------( 89       ( 08 Oct 2017 07:46 )             39.9                   Radiology:

## 2017-10-08 NOTE — PROGRESS NOTE ADULT - SUBJECTIVE AND OBJECTIVE BOX
Patient is a 89y old  Female who presents with a chief complaint of Dysnea (01 Oct 2017 13:50)      INTERVAL HPI/OVERNIGHT EVENTS:no new complaint    MEDICATIONS  (STANDING):  buPROPion XL . 150 milliGRAM(s) Oral daily  carbidopa/levodopa  25/100 1 Tablet(s) Oral daily  carvedilol 3.125 milliGRAM(s) Oral every 12 hours  fluticasone propionate 50 MICROgram(s)/spray Nasal Spray 1 Spray(s) Both Nostrils daily  furosemide    Tablet 20 milliGRAM(s) Oral daily  hydrALAZINE 50 milliGRAM(s) Oral every 8 hours  influenza   Vaccine 0.5 milliLiter(s) IntraMuscular once  isosorbide   dinitrate Tablet (ISORDIL) 30 milliGRAM(s) Oral daily  levothyroxine 75 MICROGram(s) Oral daily  multivitamin 1 Tablet(s) Oral daily  pantoprazole    Tablet 40 milliGRAM(s) Oral before breakfast  potassium chloride    Tablet ER 10 milliEquivalent(s) Oral daily    MEDICATIONS  (PRN):  acetaminophen   Tablet 650 milliGRAM(s) Oral every 6 hours PRN moderate pain 4-6  ALBUTerol    0.083% 2.5 milliGRAM(s) Nebulizer every 6 hours PRN Shortness of Breath and/or Wheezing      Allergies    IV Contrast (Unknown)  penicillin (Unknown)    Intolerances          Vital Signs Last 24 Hrs  T(C): 36.6 (08 Oct 2017 10:32), Max: 38 (07 Oct 2017 21:45)  T(F): 97.8 (08 Oct 2017 10:32), Max: 100.4 (07 Oct 2017 21:45)  HR: 64 (08 Oct 2017 10:32) (64 - 80)  BP: 151/70 (08 Oct 2017 10:32) (124/66 - 151/74)  BP(mean): --  RR: 16 (08 Oct 2017 10:32) (16 - 18)  SpO2: 92% (08 Oct 2017 10:32) (90% - 97%)    LABS:                        12.4   9.0   )-----------( 89       ( 08 Oct 2017 07:46 )             39.9     10-08    136  |  104  |  47<H>  ----------------------------<  103<H>  4.6   |  22  |  1.30    Ca    8.8      08 Oct 2017 07:46          CAPILLARY BLOOD GLUCOSE              10-07 @ 07:01  -  10-08 @ 07:00  --------------------------------------------------------  IN: 420 mL / OUT: 0 mL / NET: 420 mL    10-08 @ 07:01  -  10-08 @ 12:06  --------------------------------------------------------  IN: 420 mL / OUT: 0 mL / NET: 420 mL          RADIOLOGY & ADDITIONAL TESTS:    Imaging Personally Reviewed:  [x ] YES  [ ] NO    Consultant(s) Notes Reviewed:  [ x] YES  [ ] NO    Care Discussed with Consultants/Other Providers [x ] YES  [ ] NO

## 2017-10-08 NOTE — PROGRESS NOTE ADULT - SUBJECTIVE AND OBJECTIVE BOX
Date/Time Patient Seen:  		  Referring MD:   Data Reviewed	       Patient is a 89y old  Female who presents with a chief complaint of Dysnea (01 Oct 2017 13:50)    in bed  seen and examined  vs and meds reviewed      Subjective/HPI     PAST MEDICAL & SURGICAL HISTORY:  CLL (chronic lymphocytic leukemia)  Dementia with behavioral disturbance, unspecified dementia type  Gastrointestinal hemorrhage, unspecified gastrointestinal hemorrhage type  Nontraumatic intracerebral hemorrhage, unspecified cerebral location, unspecified laterality  Rhinitis  Anxiety  GERD (gastroesophageal reflux disease)  Hypothyroidism  Coronary artery disease  COPD (chronic obstructive pulmonary disease)  Parkinson's disease  Closed fracture of neck of left femur, initial encounter        Medication list         MEDICATIONS  (STANDING):  buPROPion XL . 150 milliGRAM(s) Oral daily  carbidopa/levodopa  25/100 1 Tablet(s) Oral daily  carvedilol 3.125 milliGRAM(s) Oral every 12 hours  fluticasone propionate 50 MICROgram(s)/spray Nasal Spray 1 Spray(s) Both Nostrils daily  furosemide    Tablet 20 milliGRAM(s) Oral daily  hydrALAZINE 50 milliGRAM(s) Oral every 8 hours  influenza   Vaccine 0.5 milliLiter(s) IntraMuscular once  isosorbide   dinitrate Tablet (ISORDIL) 30 milliGRAM(s) Oral daily  levothyroxine 75 MICROGram(s) Oral daily  multivitamin 1 Tablet(s) Oral daily  pantoprazole    Tablet 40 milliGRAM(s) Oral before breakfast  potassium chloride    Tablet ER 10 milliEquivalent(s) Oral daily    MEDICATIONS  (PRN):  acetaminophen   Tablet 650 milliGRAM(s) Oral every 6 hours PRN moderate pain 4-6  ALBUTerol    0.083% 2.5 milliGRAM(s) Nebulizer every 6 hours PRN Shortness of Breath and/or Wheezing         Vitals log        ICU Vital Signs Last 24 Hrs  T(C): 36.7 (08 Oct 2017 05:00), Max: 38 (07 Oct 2017 21:45)  T(F): 98 (08 Oct 2017 05:00), Max: 100.4 (07 Oct 2017 21:45)  HR: 64 (08 Oct 2017 05:00) (64 - 80)  BP: 144/75 (08 Oct 2017 05:00) (124/66 - 157/70)  BP(mean): --  ABP: --  ABP(mean): --  RR: 18 (08 Oct 2017 05:00) (18 - 26)  SpO2: 97% (08 Oct 2017 05:00) (90% - 97%)           Input and Output:  I&O's Detail    07 Oct 2017 07:01  -  08 Oct 2017 07:00  --------------------------------------------------------  IN:    Oral Fluid: 420 mL  Total IN: 420 mL    OUT:  Total OUT: 0 mL    Total NET: 420 mL          Lab Data    10-07    139  |  103  |  46<H>  ----------------------------<  87  4.3   |  27  |  1.35<H>    Ca    9.1      07 Oct 2017 07:32              Review of Systems	      Objective     Physical Examination    head at  heart - s1s2  lungs - dec BS  abd - soft      Pertinent Lab findings & Imaging      Desire:  NO   Adequate UO     I&O's Detail    07 Oct 2017 07:01  -  08 Oct 2017 07:00  --------------------------------------------------------  IN:    Oral Fluid: 420 mL  Total IN: 420 mL    OUT:  Total OUT: 0 mL    Total NET: 420 mL               Discussed with:     Cultures:	        Radiology

## 2017-10-08 NOTE — PROGRESS NOTE ADULT - ASSESSMENT
89 yr old with PMH of Dementia,depression,severe aortic stenosis, chf, CVA- haemorruagic stroke in 2005,severe GI bleed 4 yrs back,pleural effusion, gait disbility following CVA,possible parkinsonafter CVa,hypothyroid, lactose intolerant,allergic to penicillin and contrast dye,recently treated with levofloxacin for possible lung infection, sent from assisted living for SOB, as per son pt does have intermittent episodes of SOB, and is usually better if stays upright, and possible also chokes on food, loosing weight , takes puree diet , no fever, no loss of appetite, ambulates with walker , no bowel complaint,    history obtained from SON who is a podiatry  Pt has living will is full code at  present  admitted with SOB has elevated pro BNP, with b/l pleural effusion, most likely acute exacerbation of CHF diastolic with aortic insufficiency,h/o CVA, frailitity, ambulatory disabilty , with possible dysphagia, arf, has ESBL UTI, received invanz one dose awaiting ID consult, echo b/l atrial enlargement with severe aortic incompetence, ARF with diuresis likely, nephro f up, hydartion to continue, trend bmp, trending down, DC plan

## 2017-10-08 NOTE — PROGRESS NOTE ADULT - ASSESSMENT
89 year old female, history dementia, CLL, ?aortic stenosis, pleural effuisons, s/p CVA. Brought to ER for shortness of breath. Chronic? bilateral effusions  noted. Originally given IV Lasix with relief.  Now feeling better.     Echocardiogram compatible with moderate/severe AI.    Mild tricuspid insufficiency and moderate MR.    10/8/17   Condition essentially unchanged.  Patient sleeping comfortably, lying flat.  Easily arousable.  No complaints offered.  No significant arrhythmias noted.    Plan:  - Continue present therapy.  - Ambulate with assistance if possible.  - Being followed by Pulmonary and Renal.  - D/C planning.

## 2017-10-08 NOTE — PROGRESS NOTE ADULT - SUBJECTIVE AND OBJECTIVE BOX
No distress    Vital Signs Last 24 Hrs  T(C): 37.2 (10-08-17 @ 17:07), Max: 38 (10-07-17 @ 21:45)  T(F): 99 (10-08-17 @ 17:07), Max: 100.4 (10-07-17 @ 21:45)  HR: 69 (10-08-17 @ 17:07) (64 - 78)  BP: 146/75 (10-08-17 @ 17:07) (135/84 - 151/74)  BP(mean): --  RR: 18 (10-08-17 @ 17:07) (16 - 18)  SpO2: 93% (10-08-17 @ 17:07) (92% - 97%)    PHYSICAL EXAM:  General: NAD  Respiratory: b/l air entry  Cardiovascular: S1 S2  Gastrointestinal: soft  Extremities:  no edema    acetaminophen   Tablet 650 milliGRAM(s) Oral every 6 hours PRN  ALBUTerol    0.083% 2.5 milliGRAM(s) Nebulizer every 6 hours PRN  buPROPion XL . 150 milliGRAM(s) Oral daily  carbidopa/levodopa  25/100 1 Tablet(s) Oral daily  carvedilol 3.125 milliGRAM(s) Oral every 12 hours  fluticasone propionate 50 MICROgram(s)/spray Nasal Spray 1 Spray(s) Both Nostrils daily  furosemide    Tablet 20 milliGRAM(s) Oral daily  hydrALAZINE 50 milliGRAM(s) Oral every 8 hours  influenza   Vaccine 0.5 milliLiter(s) IntraMuscular once  isosorbide   dinitrate Tablet (ISORDIL) 30 milliGRAM(s) Oral daily  levothyroxine 75 MICROGram(s) Oral daily  multivitamin 1 Tablet(s) Oral daily  pantoprazole    Tablet 40 milliGRAM(s) Oral before breakfast  potassium chloride    Tablet ER 10 milliEquivalent(s) Oral daily                          12.4   9.0   )-----------( 89       ( 08 Oct 2017 07:46 )             39.9     08 Oct 2017 07:46    136    |  104    |  47     ----------------------------<  103    4.6     |  22     |  1.30     Ca    8.8        08 Oct 2017 07:46        Assessment and Plan:     CM, AS, AI  CKD stable  Continue Lasix  BMP in am  Would avoid ACE/ARB

## 2017-10-09 ENCOUNTER — TRANSCRIPTION ENCOUNTER (OUTPATIENT)
Age: 82
End: 2017-10-09

## 2017-10-09 LAB
ANION GAP SERPL CALC-SCNC: 9 MMOL/L — SIGNIFICANT CHANGE UP (ref 5–17)
BASE EXCESS BLDA CALC-SCNC: -1 MMOL/L — SIGNIFICANT CHANGE UP (ref -2–2)
BLOOD GAS SOURCE: SIGNIFICANT CHANGE UP
BUN SERPL-MCNC: 48 MG/DL — HIGH (ref 7–23)
CALCIUM SERPL-MCNC: 8.7 MG/DL — SIGNIFICANT CHANGE UP (ref 8.4–10.5)
CHLORIDE SERPL-SCNC: 104 MMOL/L — SIGNIFICANT CHANGE UP (ref 96–108)
CO2 SERPL-SCNC: 26 MMOL/L — SIGNIFICANT CHANGE UP (ref 22–31)
CREAT SERPL-MCNC: 1.44 MG/DL — HIGH (ref 0.5–1.3)
GLUCOSE SERPL-MCNC: 91 MG/DL — SIGNIFICANT CHANGE UP (ref 70–99)
HCO3 BLDA-SCNC: 24 MMOL/L — SIGNIFICANT CHANGE UP (ref 21–29)
HCT VFR BLD CALC: 38.9 % — SIGNIFICANT CHANGE UP (ref 34.5–45)
HGB BLD-MCNC: 12 G/DL — SIGNIFICANT CHANGE UP (ref 11.5–15.5)
HOROWITZ INDEX BLDA+IHG-RTO: 28 — SIGNIFICANT CHANGE UP
MCHC RBC-ENTMCNC: 28.5 PG — SIGNIFICANT CHANGE UP (ref 27–34)
MCHC RBC-ENTMCNC: 30.9 GM/DL — LOW (ref 32–36)
MCV RBC AUTO: 92.2 FL — SIGNIFICANT CHANGE UP (ref 80–100)
PCO2 BLDA: 25 MMHG — LOW (ref 32–46)
PH BLD: 7.54 — HIGH (ref 7.35–7.45)
PLATELET # BLD AUTO: 106 K/UL — LOW (ref 150–400)
PO2 BLDA: 59 MMHG — LOW (ref 74–108)
POTASSIUM SERPL-MCNC: 3.9 MMOL/L — SIGNIFICANT CHANGE UP (ref 3.5–5.3)
POTASSIUM SERPL-SCNC: 3.9 MMOL/L — SIGNIFICANT CHANGE UP (ref 3.5–5.3)
RBC # BLD: 4.21 M/UL — SIGNIFICANT CHANGE UP (ref 3.8–5.2)
RBC # FLD: 14.6 % — HIGH (ref 10.3–14.5)
SAO2 % BLDA: 92 % — SIGNIFICANT CHANGE UP (ref 92–96)
SODIUM SERPL-SCNC: 139 MMOL/L — SIGNIFICANT CHANGE UP (ref 135–145)
WBC # BLD: 11.6 K/UL — HIGH (ref 3.8–10.5)
WBC # FLD AUTO: 11.6 K/UL — HIGH (ref 3.8–10.5)

## 2017-10-09 PROCEDURE — 71010: CPT | Mod: 26

## 2017-10-09 PROCEDURE — 99291 CRITICAL CARE FIRST HOUR: CPT

## 2017-10-09 RX ORDER — BUPROPION HYDROCHLORIDE 150 MG/1
1 TABLET, EXTENDED RELEASE ORAL
Qty: 0 | Refills: 0 | COMMUNITY

## 2017-10-09 RX ORDER — CARBIDOPA AND LEVODOPA 25; 100 MG/1; MG/1
1 TABLET ORAL
Qty: 0 | Refills: 0 | COMMUNITY

## 2017-10-09 RX ORDER — ISOSORBIDE DINITRATE 5 MG/1
1 TABLET ORAL
Qty: 0 | Refills: 0 | COMMUNITY

## 2017-10-09 RX ORDER — ISOSORBIDE DINITRATE 5 MG/1
1 TABLET ORAL
Qty: 0 | Refills: 0 | COMMUNITY
Start: 2017-10-09

## 2017-10-09 RX ORDER — CARVEDILOL PHOSPHATE 80 MG/1
1 CAPSULE, EXTENDED RELEASE ORAL
Qty: 0 | Refills: 0 | COMMUNITY

## 2017-10-09 RX ORDER — CARBIDOPA AND LEVODOPA 25; 100 MG/1; MG/1
1 TABLET ORAL
Qty: 0 | Refills: 0 | COMMUNITY
Start: 2017-10-09

## 2017-10-09 RX ORDER — POTASSIUM CHLORIDE 20 MEQ
40 PACKET (EA) ORAL ONCE
Qty: 0 | Refills: 0 | Status: DISCONTINUED | OUTPATIENT
Start: 2017-10-09 | End: 2017-10-09

## 2017-10-09 RX ORDER — BUPROPION HYDROCHLORIDE 150 MG/1
1 TABLET, EXTENDED RELEASE ORAL
Qty: 0 | Refills: 0 | COMMUNITY
Start: 2017-10-09

## 2017-10-09 RX ORDER — HYDRALAZINE HCL 50 MG
1 TABLET ORAL
Qty: 90 | Refills: 0 | OUTPATIENT
Start: 2017-10-09 | End: 2017-12-22

## 2017-10-09 RX ORDER — FUROSEMIDE 40 MG
1 TABLET ORAL
Qty: 0 | Refills: 0 | COMMUNITY

## 2017-10-09 RX ORDER — FUROSEMIDE 40 MG
1 TABLET ORAL
Qty: 0 | Refills: 0 | COMMUNITY
Start: 2017-10-09

## 2017-10-09 RX ORDER — HYDRALAZINE HCL 50 MG
1 TABLET ORAL
Qty: 90 | Refills: 0 | OUTPATIENT
Start: 2017-10-09 | End: 2017-11-08

## 2017-10-09 RX ORDER — CARVEDILOL PHOSPHATE 80 MG/1
1 CAPSULE, EXTENDED RELEASE ORAL
Qty: 0 | Refills: 0 | COMMUNITY
Start: 2017-10-09

## 2017-10-09 RX ADMIN — CARVEDILOL PHOSPHATE 3.12 MILLIGRAM(S): 80 CAPSULE, EXTENDED RELEASE ORAL at 17:28

## 2017-10-09 RX ADMIN — Medication 20 MILLIGRAM(S): at 05:00

## 2017-10-09 RX ADMIN — Medication 10 MILLIEQUIVALENT(S): at 11:32

## 2017-10-09 RX ADMIN — CARVEDILOL PHOSPHATE 3.12 MILLIGRAM(S): 80 CAPSULE, EXTENDED RELEASE ORAL at 05:00

## 2017-10-09 RX ADMIN — Medication 50 MILLIGRAM(S): at 21:13

## 2017-10-09 RX ADMIN — Medication 75 MICROGRAM(S): at 05:00

## 2017-10-09 RX ADMIN — BUPROPION HYDROCHLORIDE 150 MILLIGRAM(S): 150 TABLET, EXTENDED RELEASE ORAL at 11:32

## 2017-10-09 RX ADMIN — PANTOPRAZOLE SODIUM 40 MILLIGRAM(S): 20 TABLET, DELAYED RELEASE ORAL at 05:00

## 2017-10-09 RX ADMIN — Medication 50 MILLIGRAM(S): at 05:00

## 2017-10-09 RX ADMIN — CARBIDOPA AND LEVODOPA 1 TABLET(S): 25; 100 TABLET ORAL at 11:32

## 2017-10-09 RX ADMIN — Medication 1 TABLET(S): at 11:33

## 2017-10-09 RX ADMIN — ISOSORBIDE DINITRATE 30 MILLIGRAM(S): 5 TABLET ORAL at 11:32

## 2017-10-09 RX ADMIN — INFLUENZA VIRUS VACCINE 0.5 MILLILITER(S): 15; 15; 15; 15 SUSPENSION INTRAMUSCULAR at 12:35

## 2017-10-09 RX ADMIN — Medication 1 SPRAY(S): at 11:33

## 2017-10-09 RX ADMIN — Medication 50 MILLIGRAM(S): at 14:36

## 2017-10-09 NOTE — DISCHARGE NOTE ADULT - MEDICATION SUMMARY - MEDICATIONS TO TAKE
I will START or STAY ON the medications listed below when I get home from the hospital:    isosorbide dinitrate 30 mg oral tablet  -- 1 tab(s) by mouth once a day  -- Indication: For Cad    carbidopa-levodopa 25 mg-100 mg oral tablet  -- 1 tab(s) by mouth once a day  -- Indication: For Parkinson    carvedilol 3.125 mg oral tablet  -- 1 tab(s) by mouth every 12 hours  -- Indication: For Htn    DuoNeb 0.5 mg-2.5 mg/3 mL inhalation solution  -- 3 milliliter(s) inhaled 2 times a day  -- Indication: For COPD (chronic obstructive pulmonary disease)    Symbicort 160 mcg-4.5 mcg/inh inhalation aerosol  -- 2 puff(s) inhaled 2 times a day  -- Indication: For COPD (chronic obstructive pulmonary disease)    bacitracin 500 units/g topical ointment  -- Apply on skin to affected area once a day  -- Indication: For Dermatitis    furosemide 20 mg oral tablet  -- 1 tab(s) by mouth once a day  -- Indication: For Chf    potassium chloride 10 mEq oral tablet, extended release  -- 1 tab(s) by mouth once a day  -- Indication: For Chf    fluticasone 50 mcg/inh nasal spray  -- 1 spray(s) in each nostril once a day  -- Indication: For COPD (chronic obstructive pulmonary disease)    omeprazole 20 mg oral delayed release capsule  -- 1 cap(s) by mouth once a day  -- Indication: For GERD (gastroesophageal reflux disease)    buPROPion 150 mg/24 hours (XL) oral tablet, extended release  -- 1 tab(s) by mouth once a day  -- Indication: For Depression    levothyroxine 75 mcg (0.075 mg) oral tablet  -- 1 tab(s) by mouth once a day  -- Indication: For Hypothyroidism    hydrALAZINE 50 mg oral tablet  -- 1 tab(s) by mouth every 8 hours  -- Indication: For Htn    Multiple Vitamins oral tablet  -- 1 tab(s) by mouth once a day  -- Indication: For suppl    Calcium 600+D 600 mg-200 intl units oral tablet  -- 1 tab(s) by mouth once a day  -- Indication: For suppl I will START or STAY ON the medications listed below when I get home from the hospital:    isosorbide dinitrate 30 mg oral tablet  -- 1 tab(s) by mouth once a day  -- Indication: For Cad    carbidopa-levodopa 25 mg-100 mg oral tablet  -- 1 tab(s) by mouth once a day  -- Indication: For Parkinson    carvedilol 3.125 mg oral tablet  -- 1 tab(s) by mouth every 12 hours  -- Indication: For Htn    DuoNeb 0.5 mg-2.5 mg/3 mL inhalation solution  -- 3 milliliter(s) inhaled 2 times a day  -- Indication: For COPD (chronic obstructive pulmonary disease)    Symbicort 160 mcg-4.5 mcg/inh inhalation aerosol  -- 2 puff(s) inhaled 2 times a day  -- Indication: For COPD (chronic obstructive pulmonary disease)    bacitracin 500 units/g topical ointment  -- Apply on skin to affected area once a day  -- Indication: For Dermatitis    fluticasone 50 mcg/inh nasal spray  -- 1 spray(s) in each nostril once a day  -- Indication: For COPD (chronic obstructive pulmonary disease)    omeprazole 20 mg oral delayed release capsule  -- 1 cap(s) by mouth once a day  -- Indication: For GERD (gastroesophageal reflux disease)    buPROPion 150 mg/24 hours (XL) oral tablet, extended release  -- 1 tab(s) by mouth once a day  -- Indication: For Depression    levothyroxine 100 mcg (0.1 mg) oral tablet  -- 1 tab(s) by mouth once a day  -- Indication: For Hypothyroidism    hydrALAZINE 50 mg oral tablet  -- 1 tab(s) by mouth every 8 hours  -- Indication: For Htn    Calcium 600+D 600 mg-200 intl units oral tablet  -- 1 tab(s) by mouth once a day  -- Indication: For suppl    Multiple Vitamins oral tablet  -- 1 tab(s) by mouth once a day  -- Indication: For suppl

## 2017-10-09 NOTE — CHART NOTE - NSCHARTNOTEFT_GEN_A_CORE
Patient is a 89y old  Female who presents with a chief complaint of Dysnea (09 Oct 2017 11:24)      Rapid response was called on [id number] patient for unresponsiveness - momentary.    Patient was seen and examined at the bedside by the rapid response team.    PAST MEDICAL & SURGICAL HISTORY:  CLL (chronic lymphocytic leukemia)  Dementia with behavioral disturbance, unspecified dementia type  Gastrointestinal hemorrhage, unspecified gastrointestinal hemorrhage type  Nontraumatic intracerebral hemorrhage, unspecified cerebral location, unspecified laterality  Rhinitis  Anxiety  GERD (gastroesophageal reflux disease)  Hypothyroidism  Coronary artery disease  COPD (chronic obstructive pulmonary disease)  Closed fracture of neck of left femur, initial encounter      MEDICATIONS  (STANDING):  buPROPion XL . 150 milliGRAM(s) Oral daily  carbidopa/levodopa  25/100 1 Tablet(s) Oral daily  carvedilol 3.125 milliGRAM(s) Oral every 12 hours  fluticasone propionate 50 MICROgram(s)/spray Nasal Spray 1 Spray(s) Both Nostrils daily  furosemide    Tablet 20 milliGRAM(s) Oral daily  hydrALAZINE 50 milliGRAM(s) Oral every 8 hours  isosorbide   dinitrate Tablet (ISORDIL) 30 milliGRAM(s) Oral daily  levothyroxine 75 MICROGram(s) Oral daily  multivitamin 1 Tablet(s) Oral daily  pantoprazole    Tablet 40 milliGRAM(s) Oral before breakfast  potassium chloride    Tablet ER 10 milliEquivalent(s) Oral daily    MEDICATIONS  (PRN):  acetaminophen   Tablet 650 milliGRAM(s) Oral every 6 hours PRN moderate pain 4-6  ALBUTerol    0.083% 2.5 milliGRAM(s) Nebulizer every 6 hours PRN Shortness of Breath and/or Wheezing      Allergies    IV Contrast (Unknown)  penicillin (Unknown)    Intolerances        Vital Signs Last 24 Hrs  T(C): 36.2 (09 Oct 2017 14:09), Max: 37.2 (08 Oct 2017 17:07)  T(F): 97.2 (09 Oct 2017 14:09), Max: 99 (08 Oct 2017 17:07)  HR: 79 (09 Oct 2017 14:09) (68 - 79)  BP: 135/82 (09 Oct 2017 14:09) (112/84 - 165/87)  BP(mean): --  RR: 16 (09 Oct 2017 14:09) (16 - 18)  SpO2: 95% (09 Oct 2017 14:09) (93% - 98%)    PHYSICAL EXAM:  frail elderly female in no distress  dry MM  lungs clear anteriorly   RRR  abd soft  no edema  knows her name; moves all extremeties    LABS:                        12.0   11.6  )-----------( 106      ( 09 Oct 2017 07:52 )             38.9     09 Oct 2017 07:52    139    |  104    |  48     ----------------------------<  91     3.9     |  26     |  1.44     Ca    8.7        09 Oct 2017 07:52          CAPILLARY BLOOD GLUCOSE            RADIOLOGY & ADDITIONAL TESTS: reviewed    Impression/Plan: 89 female with    1. momentary lapse of consciousness: possible orthostatic ?syncope. check orthostatic vitals. bed rest for now. careful use of diuretics - to avoid overdiuresis.  f/up CXR.    2. chronic diastolic CHF / COPD: care per cardiology/pulmonary, but suggest stopping lasix due to contraction alkalosis on ABG.    3. dementia: able to make decisions    4. GOC: DNR DNI    5. discharge to rehab cancelled.  d/w Chadd Barton/Marcela.        Critical care time spent at bedside and coordinating care for patient:

## 2017-10-09 NOTE — DISCHARGE NOTE ADULT - MEDICATION SUMMARY - MEDICATIONS TO STOP TAKING
I will STOP taking the medications listed below when I get home from the hospital:    enalapril 10 mg oral tablet  -- 1 tab(s) by mouth once a day I will STOP taking the medications listed below when I get home from the hospital:    enalapril 10 mg oral tablet  -- 1 tab(s) by mouth once a day    furosemide 20 mg oral tablet  -- 1 tab(s) by mouth once a day

## 2017-10-09 NOTE — PROGRESS NOTE ADULT - ASSESSMENT
·	CHAVO: On diuresis and ACEI rx.  ·	Hypertension  ·	CHF    Stable renal function.  BP acceptable. Follow blood work as outpt. Salt restriction. D/c planning.

## 2017-10-09 NOTE — PROGRESS NOTE ADULT - SUBJECTIVE AND OBJECTIVE BOX
Patient is a 89y Female with a known history of :  Acute renal failure, unspecified acute renal failure type (N17.9)  CKD (chronic kidney disease) (N18.9)  Aortic insufficiency (I35.1)  ESBL (extended spectrum beta-lactamase) producing bacteria infection (A49.9)  COPD (chronic obstructive pulmonary disease) (J44.9)  CLL (chronic lymphocytic leukemia) (C91.10)  Aortic stenosis (I35.0)  Coronary artery disease (I25.10)  Hypothyroidism (E03.9)  Pleural effusion (J90)  Dysphagia, unspecified type (R13.10)  Gastroesophageal reflux disease without esophagitis (K21.9)  Hypothyroidism, unspecified type (E03.9)  Chronic obstructive pulmonary disease, unspecified COPD type (J44.9)  Dementia with behavioral disturbance, unspecified dementia type (F03.91)  Congestive heart failure, unspecified congestive heart failure chronicity, unspecified congestive heart failure type (I50.9)    HPI:  89 yr old with PMH of Dementia,depression,severe aortic stenosis, chf, CVA- haemorruagic stroke in 2005,severe GI bleed 4 yrs back,pleural effusion, gait disbility following CVA,possible parkinsonafter CVa,hypothyroid, lactose intolerant,allergic to penicillin and contrast dye,recently treated with levofloxacin for possible lung infection, sent from assisted living for SOB, as per son pt does have intermittent episodes of SOB, and is usually better if stays upright, and possible also chokes on food, loosing weight , takes puree diet , no fever, no loss of appetite, ambulates with walker , no bowel complaint,    history obtained from SON who is a podiatry  Pt has living will is full code at  present (01 Oct 2017 10:07)        MEDICATIONS  (STANDING):  buPROPion XL . 150 milliGRAM(s) Oral daily  carbidopa/levodopa  25/100 1 Tablet(s) Oral daily  carvedilol 3.125 milliGRAM(s) Oral every 12 hours  fluticasone propionate 50 MICROgram(s)/spray Nasal Spray 1 Spray(s) Both Nostrils daily  furosemide    Tablet 20 milliGRAM(s) Oral daily  hydrALAZINE 50 milliGRAM(s) Oral every 8 hours  influenza   Vaccine 0.5 milliLiter(s) IntraMuscular once  isosorbide   dinitrate Tablet (ISORDIL) 30 milliGRAM(s) Oral daily  levothyroxine 75 MICROGram(s) Oral daily  multivitamin 1 Tablet(s) Oral daily  pantoprazole    Tablet 40 milliGRAM(s) Oral before breakfast  potassium chloride    Tablet ER 10 milliEquivalent(s) Oral daily    MEDICATIONS  (PRN):  acetaminophen   Tablet 650 milliGRAM(s) Oral every 6 hours PRN moderate pain 4-6  ALBUTerol    0.083% 2.5 milliGRAM(s) Nebulizer every 6 hours PRN Shortness of Breath and/or Wheezing      ALLERGIES: IV Contrast (Unknown)  penicillin (Unknown)      FAMILY HISTORY:      Social history:  Alochol:   Smoking:   Drug Use:   Marital Status:     PHYSICAL EXAMINATION:  -----------------------------  T(C): 36.7 (10-09-17 @ 05:00), Max: 37.2 (10-08-17 @ 17:07)  HR: 68 (10-09-17 @ 05:00) (64 - 78)  BP: 116/61 (10-09-17 @ 06:41) (116/61 - 165/87)  RR: 16 (10-09-17 @ 05:00) (16 - 18)  SpO2: 96% (10-09-17 @ 05:00) (92% - 98%)  Wt(kg): --    10-08 @ 07:01  -  10-09 @ 07:00  --------------------------------------------------------  IN:    Oral Fluid: 1190 mL  Total IN: 1190 mL    OUT:  Total OUT: 0 mL    Total NET: 1190 mL            Constitutional: asleep, lying supine, flat  HEENT: normal oral mucosa, no oral pallor and no oral cyanosis.   Neck: normal jugular venous A waves present, normal jugular venous V waves present and no jugular venous leong A waves.   Pulmonary: good air flow  Cardiovascular: heart rate and rhythm were normal, normal S1 and S2 distant   Musculoskeletal: the gait could not be assessed..   Extremities: no clubbing of the fingernails, no localized cyanosis, no petechial hemorrhages and no ischemic changes.   Skin: normal skin color and pigmentation, no rash, no venous stasis, no skin lesions, no skin ulcer and no xanthoma was observed.       LABS:   --------  CBC Full  -  ( 08 Oct 2017 07:46 )  WBC Count : 9.0 K/uL  Hemoglobin : 12.4 g/dL  Hematocrit : 39.9 %  Platelet Count - Automated : 89 K/uL  Mean Cell Volume : 93.2 fl  Mean Cell Hemoglobin : 28.8 pg  Mean Cell Hemoglobin Concentration : 30.9 gm/dL  Auto Neutrophil # : x  Auto Lymphocyte # : x  Auto Monocyte # : x  Auto Eosinophil # : x  Auto Basophil # : x  Auto Neutrophil % : x  Auto Lymphocyte % : x  Auto Monocyte % : x  Auto Eosinophil % : x  Auto Basophil % : x      10-08    136  |  104  |  47<H>  ----------------------------<  103<H>  4.6   |  22  |  1.30    Ca    8.8      08 Oct 2017 07:46           10/9/2017:  On Tele = NSR rare ectopy.  Asleep, lying flat supine.            Radiology:

## 2017-10-09 NOTE — DISCHARGE NOTE ADULT - SECONDARY DIAGNOSIS.
Chronic obstructive pulmonary disease, unspecified COPD type Stage 3 chronic kidney disease Coronary artery disease involving native heart without angina pectoris, unspecified vessel or lesion type Dementia with behavioral disturbance, unspecified dementia type Gastroesophageal reflux disease without esophagitis Hypothyroidism, unspecified type

## 2017-10-09 NOTE — PROGRESS NOTE ADULT - ASSESSMENT
89 yr old with PMH of Dementia,depression,severe aortic incoprtence, chf, CVA- haemorruagic stroke in 2005,severe GI bleed 4 yrs back,pleural effusion, gait disbility following CVA,possible parkinsonafter CVa,hypothyroid, lactose intolerant,allergic to penicillin and contrast dye,recently treated with levofloxacin for possible lung infection, sent from assisted living for SOB, as per son pt does have intermittent episodes of SOB, and is usually better if stays upright, and possible also chokes on food, loosing weight , takes puree diet , no fever, no loss of appetite, ambulates with walker , no bowel complaint,    history obtained from SON who is a podiatry  Pt has living will is full code at  present  admitted with SOB has elevated pro BNP, with b/l pleural effusion, most likely acute exacerbation of CHF diastolic with aortic insufficiency,h/o CVA, frailitity, ambulatory disabilty , with possible dysphagia, arf, has ESBL UTI, received invanz one dose awaiting ID consult, echo b/l atrial enlargement with severe aortic incompetence, ARF with diuresis likely, nephro f up, hydartion to continue, trend bmp, trending down, DC plan 89 yr old with PMH of Dementia,depression,severe aortic incoprtence, chf, CVA- haemorruagic stroke in 2005,severe GI bleed 4 yrs back,pleural effusion, gait disbility following CVA,possible parkinsonafter CVa,hypothyroid, lactose intolerant,allergic to penicillin and contrast dye,recently treated with levofloxacin for possible lung infection, sent from assisted living for SOB, as per son pt does have intermittent episodes of SOB, and is usually better if stays upright, and possible also chokes on food, loosing weight , takes puree diet , no fever, no loss of appetite, ambulates with walker , no bowel complaint,    history obtained from SON who is a podiatry  Pt has living will is full code at  present  admitted with SOB has elevated pro BNP, with b/l pleural effusion, most likely acute exacerbation of CHF diastolic with aortic insufficiency,h/o CVA, frailitity, ambulatory disabilty , with possible dysphagia, arf, has ESBL UTI, received invanz one dose had ID consult and as asymptomatic likely asymptomatic bacteruria with ESBL e coli, abx stopped,  echo b/l atrial enlargement with severe aortic incompetence, ARF with diuresis likely, nephro f up, hydartion to continue, trend bmp, trending down,CKD 3, DC plan to assisted living , f up with pcp.  hypothyroidism stable.GERD stable, dysphagi- continue dysphagia diet and aspiration precaution.  stable for discharge

## 2017-10-09 NOTE — PROGRESS NOTE ADULT - SUBJECTIVE AND OBJECTIVE BOX
Patient is a 89y old  Female who presents with a chief complaint of Dysnea (01 Oct 2017 13:50)      INTERVAL HPI/OVERNIGHT EVENTS:    MEDICATIONS  (STANDING):  buPROPion XL . 150 milliGRAM(s) Oral daily  carbidopa/levodopa  25/100 1 Tablet(s) Oral daily  carvedilol 3.125 milliGRAM(s) Oral every 12 hours  fluticasone propionate 50 MICROgram(s)/spray Nasal Spray 1 Spray(s) Both Nostrils daily  furosemide    Tablet 20 milliGRAM(s) Oral daily  hydrALAZINE 50 milliGRAM(s) Oral every 8 hours  influenza   Vaccine 0.5 milliLiter(s) IntraMuscular once  isosorbide   dinitrate Tablet (ISORDIL) 30 milliGRAM(s) Oral daily  levothyroxine 75 MICROGram(s) Oral daily  multivitamin 1 Tablet(s) Oral daily  pantoprazole    Tablet 40 milliGRAM(s) Oral before breakfast  potassium chloride    Tablet ER 10 milliEquivalent(s) Oral daily    MEDICATIONS  (PRN):  acetaminophen   Tablet 650 milliGRAM(s) Oral every 6 hours PRN moderate pain 4-6  ALBUTerol    0.083% 2.5 milliGRAM(s) Nebulizer every 6 hours PRN Shortness of Breath and/or Wheezing      Allergies    IV Contrast (Unknown)  penicillin (Unknown)    Intolerances          Vital Signs Last 24 Hrs  T(C): 36.2 (09 Oct 2017 09:03), Max: 37.2 (08 Oct 2017 17:07)  T(F): 97.1 (09 Oct 2017 09:03), Max: 99 (08 Oct 2017 17:07)  HR: 72 (09 Oct 2017 09:03) (68 - 78)  BP: 112/84 (09 Oct 2017 09:03) (112/84 - 165/87)  BP(mean): --  RR: 16 (09 Oct 2017 09:03) (16 - 18)  SpO2: 94% (09 Oct 2017 09:03) (93% - 98%)    LABS:                        12.0   11.6  )-----------( 106      ( 09 Oct 2017 07:52 )             38.9     10-09    139  |  104  |  48<H>  ----------------------------<  91  3.9   |  26  |  1.44<H>    Ca    8.7      09 Oct 2017 07:52          CAPILLARY BLOOD GLUCOSE              10-08 @ 07:01  -  10-09 @ 07:00  --------------------------------------------------------  IN: 1190 mL / OUT: 0 mL / NET: 1190 mL    < from: US Transthoracic Echocardiogram w/Doppler Complete (10.02.17 @ 09:05) >  Measurements:   A root3.0 cm,LA 5.9cm, LVEDD 4.5cm,2.6cm, septal wall   thickness 1.3 cm, posterior wall thickness 1.3 cm   AV velocity 1,7   m/sec, MV velocity 1.1 m /sec  EF 65%    Mitral Valve: mild mitral annular calcification .thickened mitral valve   with moderate regurgitation ,  Aortic Valve:Thickened aortic valve with moderate to  severe   regurgitation   Left Atrium:  severely enlarged  Left Ventricle: Normal size , moderate left ventricular hypertrophy with   normal EF   ,  Pericardium: Trace pericardial effusion   Tricuspid Valve: Appears normal with mild regurgitation with RVSp 31  mm   hg   Pulmonic Valve: appears normal with mild regurgitation   Right Ventricle: Normal size with normal systolic function   Right Atrium: enlarged    Large pleural effusion noted     SUMMARY:  1. Left ventricular hypertrophy with normal EF  2. Biatrial enlargement   3. aortic sclerosis moderate to  severe AI   4. mild Tricuspid regurgitation with RVSP 31 mm hg .  5. Moderate mitral regurgitation.          < end of copied text >  < from: US Transthoracic Echocardiogram w/Doppler Complete (10.02.17 @ 09:05) >  Measurements:   A root3.0 cm,LA 5.9cm, LVEDD 4.5cm,2.6cm, septal wall   thickness 1.3 cm, posterior wall thickness 1.3 cm   AV velocity 1,7   m/sec, MV velocity 1.1 m /sec  EF 65%    Mitral Valve: mild mitral annular calcification .thickened mitral valve   with moderate regurgitation ,  Aortic Valve:Thickened aortic valve with moderate to  severe   regurgitation   Left Atrium:  severely enlarged  Left Ventricle: Normal size , moderate left ventricular hypertrophy with   normal EF   ,  Pericardium: Trace pericardial effusion   Tricuspid Valve: Appears normal with mild regurgitation with RVSp 31  mm   hg   Pulmonic Valve: appears normal with mild regurgitation   Right Ventricle: Normal size with normal systolic function   Right Atrium: enlarged    Large pleural effusion noted     SUMMARY:  1. Left ventricular hypertrophy with normal EF  2. Biatrial enlargement   3. aortic sclerosis moderate to  severe AI   4. mild Tricuspid regurgitation with RVSP 31 mm hg .  5. Moderate mitral regurgitation.          < end of copied text >        RADIOLOGY & ADDITIONAL TESTS:    Imaging Personally Reviewed:  [x ] YES  [ ] NO    Consultant(s) Notes Reviewed:  [x ] YES  [ ] NO    Care Discussed with Consultants/Other Providers [x ] YES  [ ] NO

## 2017-10-09 NOTE — PROGRESS NOTE ADULT - SUBJECTIVE AND OBJECTIVE BOX
Patient is a 89y old  Female who presents with a chief complaint of Dysnea (01 Oct 2017 13:50)      Patient seen in follow up for CHAVO. Clinically better. Creatinine stable.     PAST MEDICAL HISTORY:  CLL (chronic lymphocytic leukemia)  Dementia with behavioral disturbance, unspecified dementia type  Gastrointestinal hemorrhage, unspecified gastrointestinal hemorrhage type  Nontraumatic intracerebral hemorrhage, unspecified cerebral location, unspecified laterality  Rhinitis  Anxiety  GERD (gastroesophageal reflux disease)  Hypothyroidism  Coronary artery disease  COPD (chronic obstructive pulmonary disease)  Parkinson's disease    MEDICATIONS  (STANDING):  buPROPion XL . 150 milliGRAM(s) Oral daily  carbidopa/levodopa  25/100 1 Tablet(s) Oral daily  carvedilol 3.125 milliGRAM(s) Oral every 12 hours  fluticasone propionate 50 MICROgram(s)/spray Nasal Spray 1 Spray(s) Both Nostrils daily  furosemide    Tablet 20 milliGRAM(s) Oral daily  hydrALAZINE 50 milliGRAM(s) Oral every 8 hours  isosorbide   dinitrate Tablet (ISORDIL) 30 milliGRAM(s) Oral daily  levothyroxine 75 MICROGram(s) Oral daily  multivitamin 1 Tablet(s) Oral daily  pantoprazole    Tablet 40 milliGRAM(s) Oral before breakfast  potassium chloride    Tablet ER 10 milliEquivalent(s) Oral daily    MEDICATIONS  (PRN):  acetaminophen   Tablet 650 milliGRAM(s) Oral every 6 hours PRN moderate pain 4-6  ALBUTerol    0.083% 2.5 milliGRAM(s) Nebulizer every 6 hours PRN Shortness of Breath and/or Wheezing    T(C): 36.2 (10-09-17 @ 14:09), Max: 38 (10-07-17 @ 21:45)  HR: 79 (10-09-17 @ 14:09) (64 - 80)  BP: 135/82 (10-09-17 @ 14:09) (112/84 - 165/87)  RR: 16 (10-09-17 @ 14:09)  SpO2: 95% (10-09-17 @ 14:09)  Wt(kg): --  I&O's Detail    08 Oct 2017 07:01  -  09 Oct 2017 07:00  --------------------------------------------------------  IN:    Oral Fluid: 1190 mL  Total IN: 1190 mL    OUT:  Total OUT: 0 mL    Total NET: 1190 mL        PHYSICAL EXAM:  General: NAD  Respiratory: b/l air entry  Cardiovascular: S1 S2  Gastrointestinal: soft  Extremities:  no edema                 LABORATORY:                        12.0   11.6  )-----------( 106      ( 09 Oct 2017 07:52 )             38.9     10-09    139  |  104  |  48<H>  ----------------------------<  91  3.9   |  26  |  1.44<H>    Ca    8.7      09 Oct 2017 07:52      Sodium, Serum: 139 mmol/L (10-09 @ 07:52)  Sodium, Serum: 136 mmol/L (10-08 @ 07:46)    Potassium, Serum: 3.9 mmol/L (10-09 @ 07:52)  Potassium, Serum: 4.6 mmol/L (10-08 @ 07:46)    Hemoglobin: 12.0 g/dL (10-09 @ 07:52)  Hemoglobin: 12.4 g/dL (10-08 @ 07:46)    Creatinine, Serum 1.44 (10-09 @ 07:52)  Creatinine, Serum 1.30 (10-08 @ 07:46)  Creatinine, Serum 1.35 (10-07 @ 07:32)

## 2017-10-09 NOTE — DISCHARGE NOTE ADULT - CARE PLAN
Principal Discharge DX:	Congestive heart failure, unspecified congestive heart failure chronicity, unspecified congestive heart failure type  Goal:	improved  Instructions for follow-up, activity and diet:	lasix  Secondary Diagnosis:	Chronic obstructive pulmonary disease, unspecified COPD type  Instructions for follow-up, activity and diet:	nebs to continue  Secondary Diagnosis:	Stage 3 chronic kidney disease  Instructions for follow-up, activity and diet:	monitor, gentle hydration  Secondary Diagnosis:	Coronary artery disease involving native heart without angina pectoris, unspecified vessel or lesion type  Instructions for follow-up, activity and diet:	continue cardiac meds  Secondary Diagnosis:	Dementia with behavioral disturbance, unspecified dementia type  Instructions for follow-up, activity and diet:	supportive care  Secondary Diagnosis:	Gastroesophageal reflux disease without esophagitis  Instructions for follow-up, activity and diet:	omeprzole  Secondary Diagnosis:	Hypothyroidism, unspecified type  Instructions for follow-up, activity and diet:	levothyroxin Principal Discharge DX:	Congestive heart failure, unspecified congestive heart failure chronicity, unspecified congestive heart failure type  Goal:	improved  Instructions for follow-up, activity and diet:	stable at present cont cardiac meds  Secondary Diagnosis:	Chronic obstructive pulmonary disease, unspecified COPD type  Instructions for follow-up, activity and diet:	nebs to continue  Secondary Diagnosis:	Stage 3 chronic kidney disease  Instructions for follow-up, activity and diet:	monitor, gentle hydration  Secondary Diagnosis:	Coronary artery disease involving native heart without angina pectoris, unspecified vessel or lesion type  Instructions for follow-up, activity and diet:	continue cardiac meds  Secondary Diagnosis:	Dementia with behavioral disturbance, unspecified dementia type  Instructions for follow-up, activity and diet:	supportive care  Secondary Diagnosis:	Gastroesophageal reflux disease without esophagitis  Instructions for follow-up, activity and diet:	omeprzole  Secondary Diagnosis:	Hypothyroidism, unspecified type  Instructions for follow-up, activity and diet:	levothyroxin

## 2017-10-09 NOTE — DISCHARGE NOTE ADULT - PATIENT PORTAL LINK FT
“You can access the [facility] Patient Portal, offered by [facility], by registering with the following website: [email or web address]”

## 2017-10-09 NOTE — PROGRESS NOTE ADULT - ASSESSMENT
89 year old female, came in with episodes of SOB.  Echo = normal EF, moderate AI.    Now appears stable.  No objection to discharge.

## 2017-10-09 NOTE — DISCHARGE NOTE ADULT - HOSPITAL COURSE
89 yr old with PMH of Dementia,depression,severe aortic incoprtence, chf, CVA- haemorruagic stroke in 2005,severe GI bleed 4 yrs back,pleural effusion, gait disbility following CVA,possible parkinsonafter CVa,hypothyroid, lactose intolerant,allergic to penicillin and contrast dye,recently treated with levofloxacin for possible lung infection, sent from assisted living for SOB, as per son pt does have intermittent episodes of SOB, and is usually better if stays upright, and possible also chokes on food, loosing weight , takes puree diet , no fever, no loss of appetite, ambulates with walker , no bowel complaint,    history obtained from SON who is a podiatry  Pt has living will is full code at  present  admitted with SOB has elevated pro BNP, with b/l pleural effusion, most likely acute exacerbation of CHF diastolic with aortic insufficiency,h/o CVA, frailitity, ambulatory disabilty , with possible dysphagia, arf, has ESBL UTI, received invanz one dose had ID consult and as asymptomatic likely asymptomatic bacteruria with ESBL e coli, abx stopped,  echo b/l atrial enlargement with severe aortic incompetence, ARF with diuresis likely, nephro f up, hydartion to continue, trend bmp, trending down,CKD 3, DC plan to assisted living , f up with pcp.  hypothyroidism stable.GERD stable, dysphagi- continue dysphagia diet and aspiration precaution.  stable at discharge 89 yr old with PMH of Dementia,depression,severe aortic incoprtence, chf, CVA- haemorruagic stroke in 2005,severe GI bleed 4 yrs back,pleural effusion, gait disbility following CVA,possible parkinsonafter CVa,hypothyroid, lactose intolerant,allergic to penicillin and contrast dye,recently treated with levofloxacin for possible lung infection, sent from assisted living for SOB, as per son pt does have intermittent episodes of SOB, and is usually better if stays upright, and possible also chokes on food, loosing weight , takes puree diet , no fever, no loss of appetite, ambulates with walker , no bowel complaint,    history obtained from SON who is a podiatry  Pt has living will is full code at  present  admitted with SOB has elevated pro BNP, with b/l pleural effusion, most likely acute exacerbation of CHF diastolic with aortic insufficiency,h/o CVA, frailitity, ambulatory disabilty , with possible dysphagia, arf, has ESBL UTI, received invanz one dose had ID consult and as asymptomatic likely asymptomatic bacteruria with ESBL e coli, abx stopped,  echo b/l atrial enlargement with severe aortic incompetence, ARF with diuresis likely, nephro f up, hydartion to continue, trend bmp, trending down,CKD 3,COPD , likely had copd acute exacerbation with acute on chf diatolic,has  DC plan to assisted living , f up with pcp.  hypothyroidism stable.GERD stable, dysphagi- continue dysphagia diet and aspiration precaution.  stable at discharge 89 yr old with PMH of Dementia,depression,severe aortic incoprtence, chf, CVA- haemorruagic stroke in 2005,severe GI bleed 4 yrs back,pleural effusion, gait disbility following CVA,possible parkinson after CVa,hypothyroid, lactose intolerant,allergic to penicillin and contrast dye,recently treated with levofloxacin for possible lung infection, sent from assisted living for SOB, as per son pt does have intermittent episodes of SOB, and is usually better if stays upright, and possible also chokes on food, loosing weight , takes puree diet , no fever, no loss of appetite, ambulates with walker , no bowel complaint,    history obtained from SON who is a podiatry  Pt has living will is full code at  present  admitted with SOB has elevated pro BNP, with b/l pleural effusion, most likely acute exacerbation of CHF diastolic with aortic insufficiency,h/o CVA, frailitity, ambulatory disabilty , with possible dysphagia, arf, has ESBL UTI, received invanz one dose had ID consult and as asymptomatic likely asymptomatic bacteruria with ESBL e coli, abx stopped,  echo b/l atrial enlargement with severe aortic incompetence, ARF with diuresis likely, nephro f up, hydartion to continue, trend bmp, trending down,CKD 3,COPD , likely had copd acute exacerbation with acute on chf diatolic, Parkinson after CVa is stable on current tt ,has  DC plan to assisted living , f up with pcp.  hypothyroidism stable.GERD stable, dysphagi- continue dysphagia diet and aspiration precaution.  stable at discharge 89 yr old with PMH of Dementia,depression,severe aortic incoprtence, chf, CVA- haemorruagic stroke in 2005,severe GI bleed 4 yrs back,pleural effusion, gait disbility following CVA,possible parkinsonafter CVa,hypothyroid, lactose intolerant,allergic to penicillin and contrast dye,recently treated with levofloxacin for possible lung infection, sent from assisted living for SOB, as per son pt does have intermittent episodes of SOB, and is usually better if stays upright, and possible also chokes on food, loosing weight , takes puree diet , no fever, no loss of appetite, ambulates with walker , no bowel complaint,    history obtained from SON who is a podiatry  Pt has living will is full code at  present  admitted with SOB has elevated pro BNP, with b/l pleural effusion, most likely acute exacerbation of CHF diastolic with aortic insufficiency with copd acute on chronic ,h/o CVA- ICH , h/o GI bleed frailitity, ambulatory disabilty ,  dysphagia on mechanical soft with nectar thick fluids,, arf, has ESBL  bacteruria asymtomatic  unlikely UTI, received invanz one dose had ID consult and as asymptomatic likely asymptomatic bacteruria with ESBL e coli, abx stopped,  echo b/l atrial enlargement with severe aortic incompetence, ARF with diuresis likely, nephro f up, hydartion to continue, trend bmp, trending down,CKD 3, has asymptomatic bacteruria e coli  .GERD stable,  had short episode of unresponsiveness on 10/9 and was had respiratory  alkalosis as per blood gas with copd . improved in short time spontaneously. lasix stopped, likely vasovagal syncope  TSH is high , levothyroxin dose increased.CKD3 stable, nephro follow up noted avoid nephrotoxic meds. stable for discharge  PT   DC to assisted living

## 2017-10-09 NOTE — DISCHARGE NOTE ADULT - CARE PROVIDER_API CALL
Bev Almonte (MD), Internal Medicine; Nephrology  [address] [facility]  [address] [place] [address]  Phone: [phone]  Fax: [phone]

## 2017-10-10 LAB
ANION GAP SERPL CALC-SCNC: 8 MMOL/L — SIGNIFICANT CHANGE UP (ref 5–17)
BUN SERPL-MCNC: 52 MG/DL — HIGH (ref 7–23)
CALCIUM SERPL-MCNC: 8.7 MG/DL — SIGNIFICANT CHANGE UP (ref 8.4–10.5)
CHLORIDE SERPL-SCNC: 106 MMOL/L — SIGNIFICANT CHANGE UP (ref 96–108)
CO2 SERPL-SCNC: 27 MMOL/L — SIGNIFICANT CHANGE UP (ref 22–31)
CREAT SERPL-MCNC: 1.73 MG/DL — HIGH (ref 0.5–1.3)
GLUCOSE SERPL-MCNC: 90 MG/DL — SIGNIFICANT CHANGE UP (ref 70–99)
HCT VFR BLD CALC: 37.2 % — SIGNIFICANT CHANGE UP (ref 34.5–45)
HGB BLD-MCNC: 11.3 G/DL — LOW (ref 11.5–15.5)
MCHC RBC-ENTMCNC: 28.5 PG — SIGNIFICANT CHANGE UP (ref 27–34)
MCHC RBC-ENTMCNC: 30.5 GM/DL — LOW (ref 32–36)
MCV RBC AUTO: 93.2 FL — SIGNIFICANT CHANGE UP (ref 80–100)
PLATELET # BLD AUTO: 115 K/UL — LOW (ref 150–400)
POTASSIUM SERPL-MCNC: 4.4 MMOL/L — SIGNIFICANT CHANGE UP (ref 3.5–5.3)
POTASSIUM SERPL-SCNC: 4.4 MMOL/L — SIGNIFICANT CHANGE UP (ref 3.5–5.3)
RBC # BLD: 3.98 M/UL — SIGNIFICANT CHANGE UP (ref 3.8–5.2)
RBC # FLD: 14.4 % — SIGNIFICANT CHANGE UP (ref 10.3–14.5)
SODIUM SERPL-SCNC: 141 MMOL/L — SIGNIFICANT CHANGE UP (ref 135–145)
WBC # BLD: 10.2 K/UL — SIGNIFICANT CHANGE UP (ref 3.8–10.5)
WBC # FLD AUTO: 10.2 K/UL — SIGNIFICANT CHANGE UP (ref 3.8–10.5)

## 2017-10-10 PROCEDURE — 93010 ELECTROCARDIOGRAM REPORT: CPT

## 2017-10-10 RX ORDER — LEVOTHYROXINE SODIUM 125 MCG
100 TABLET ORAL DAILY
Qty: 0 | Refills: 0 | Status: DISCONTINUED | OUTPATIENT
Start: 2017-10-10 | End: 2017-10-12

## 2017-10-10 RX ADMIN — CARVEDILOL PHOSPHATE 3.12 MILLIGRAM(S): 80 CAPSULE, EXTENDED RELEASE ORAL at 05:40

## 2017-10-10 RX ADMIN — Medication 75 MICROGRAM(S): at 05:40

## 2017-10-10 RX ADMIN — ISOSORBIDE DINITRATE 30 MILLIGRAM(S): 5 TABLET ORAL at 12:11

## 2017-10-10 RX ADMIN — Medication 50 MILLIGRAM(S): at 21:09

## 2017-10-10 RX ADMIN — Medication 50 MILLIGRAM(S): at 13:41

## 2017-10-10 RX ADMIN — Medication 100 MICROGRAM(S): at 12:11

## 2017-10-10 RX ADMIN — Medication 50 MILLIGRAM(S): at 05:40

## 2017-10-10 RX ADMIN — Medication 650 MILLIGRAM(S): at 12:12

## 2017-10-10 RX ADMIN — BUPROPION HYDROCHLORIDE 150 MILLIGRAM(S): 150 TABLET, EXTENDED RELEASE ORAL at 12:11

## 2017-10-10 RX ADMIN — PANTOPRAZOLE SODIUM 40 MILLIGRAM(S): 20 TABLET, DELAYED RELEASE ORAL at 06:15

## 2017-10-10 RX ADMIN — Medication 1 TABLET(S): at 12:10

## 2017-10-10 RX ADMIN — Medication 1 SPRAY(S): at 12:11

## 2017-10-10 RX ADMIN — CARVEDILOL PHOSPHATE 3.12 MILLIGRAM(S): 80 CAPSULE, EXTENDED RELEASE ORAL at 17:27

## 2017-10-10 RX ADMIN — CARBIDOPA AND LEVODOPA 1 TABLET(S): 25; 100 TABLET ORAL at 12:10

## 2017-10-10 NOTE — PROGRESS NOTE ADULT - SUBJECTIVE AND OBJECTIVE BOX
Patient is a 89y old  Female who presents with a chief complaint of Dysnea (01 Oct 2017 13:50)      Patient seen in follow up for CHAVO. Discharge cancelled yesterday. Off lasix now.     PAST MEDICAL HISTORY:  CLL (chronic lymphocytic leukemia)  Dementia with behavioral disturbance, unspecified dementia type  Gastrointestinal hemorrhage, unspecified gastrointestinal hemorrhage type  Nontraumatic intracerebral hemorrhage, unspecified cerebral location, unspecified laterality  Rhinitis  Anxiety  GERD (gastroesophageal reflux disease)  Hypothyroidism  Coronary artery disease  COPD (chronic obstructive pulmonary disease)  Parkinson's disease    MEDICATIONS  (STANDING):  buPROPion XL . 150 milliGRAM(s) Oral daily  carbidopa/levodopa  25/100 1 Tablet(s) Oral daily  carvedilol 3.125 milliGRAM(s) Oral every 12 hours  fluticasone propionate 50 MICROgram(s)/spray Nasal Spray 1 Spray(s) Both Nostrils daily  hydrALAZINE 50 milliGRAM(s) Oral every 8 hours  isosorbide   dinitrate Tablet (ISORDIL) 30 milliGRAM(s) Oral daily  levothyroxine 100 MICROGram(s) Oral daily  multivitamin 1 Tablet(s) Oral daily  pantoprazole    Tablet 40 milliGRAM(s) Oral before breakfast  potassium chloride    Tablet ER 10 milliEquivalent(s) Oral daily    MEDICATIONS  (PRN):  acetaminophen   Tablet 650 milliGRAM(s) Oral every 6 hours PRN moderate pain 4-6  ALBUTerol    0.083% 2.5 milliGRAM(s) Nebulizer every 6 hours PRN Shortness of Breath and/or Wheezing    T(C): 13.8 (10-10-17 @ 05:12), Max: 37.2 (10-08-17 @ 17:07)  HR: 64 (10-10-17 @ 05:12) (64 - 79)  BP: 159/45 (10-10-17 @ 05:12) (101/56 - 165/87)  RR: 18 (10-10-17 @ 05:12)  SpO2: 95% (10-10-17 @ 05:12)  Wt(kg): --  I&O's Detail    10 Oct 2017 07:01  -  10 Oct 2017 10:30  --------------------------------------------------------  IN:    Oral Fluid: 420 mL  Total IN: 420 mL    OUT:  Total OUT: 0 mL    Total NET: 420 mL            PHYSICAL EXAM:  General: NAD  Respiratory: b/l air entry  Cardiovascular: S1 S2  Gastrointestinal: soft  Extremities:  no edema                LABORATORY:                        11.3   10.2  )-----------( 115      ( 10 Oct 2017 07:13 )             37.2     10-10    141  |  106  |  52<H>  ----------------------------<  90  4.4   |  27  |  1.73<H>    Ca    8.7      10 Oct 2017 07:13      Sodium, Serum: 141 mmol/L (10-10 @ 07:13)  Sodium, Serum: 139 mmol/L (10-09 @ 07:52)    Potassium, Serum: 4.4 mmol/L (10-10 @ 07:13)  Potassium, Serum: 3.9 mmol/L (10-09 @ 07:52)    Hemoglobin: 11.3 g/dL (10-10 @ 07:13)  Hemoglobin: 12.0 g/dL (10-09 @ 07:52)  Hemoglobin: 12.4 g/dL (10-08 @ 07:46)    Creatinine, Serum 1.73 (10-10 @ 07:13)  Creatinine, Serum 1.44 (10-09 @ 07:52)  Creatinine, Serum 1.30 (10-08 @ 07:46)            ABG - ( 09 Oct 2017 15:13 )  pH: x     /  pCO2: 25    /  pO2: 59    / HCO3: 24    / Base Excess: -1.0  /  SaO2: 92

## 2017-10-10 NOTE — PROGRESS NOTE ADULT - SUBJECTIVE AND OBJECTIVE BOX
LATOSHA MOONEY is a 89yFemale , patient examined and chart reviewed.     INTERVAL HPI/ OVERNIGHT EVENTS:   No events. Afebrile.    PAST MEDICAL & SURGICAL HISTORY:  CLL (chronic lymphocytic leukemia)  Dementia with behavioral disturbance, unspecified dementia type  Gastrointestinal hemorrhage, unspecified gastrointestinal hemorrhage type  Nontraumatic intracerebral hemorrhage, unspecified cerebral location, unspecified laterality  Rhinitis  Anxiety  GERD (gastroesophageal reflux disease)  Hypothyroidism  Coronary artery disease  COPD (chronic obstructive pulmonary disease)  Closed fracture of neck of left femur, initial encounter      For details regarding the patient's social history, family history, and other miscellaneous elements, please refer the initial infectious diseases consultation and/or the admitting history and physical examination for this admission.    ROS:  CONSTITUTIONAL:  Negative fever or chills, feels well, good appetite  EYES:  Negative  blurry vision or double vision  CARDIOVASCULAR:  Negative for chest pain or palpitations  RESPIRATORY:  Negative for cough, wheezing, or SOB   GASTROINTESTINAL:  Negative for nausea, vomiting, diarrhea, constipation, or abdominal pain  GENITOURINARY:  Negative frequency, urgency or dysuria  NEUROLOGIC:   + confusion, No headache, dizziness, lightheadedness  All other systems were reviewed and are negative     IV Contrast (Unknown)  penicillin (Unknown)      Current inpatient medications :    ANTIBIOTICS/RELEVANT:  acetaminophen   Tablet 650 milliGRAM(s) Oral every 6 hours PRN  ALBUTerol    0.083% 2.5 milliGRAM(s) Nebulizer every 6 hours PRN  buPROPion XL . 150 milliGRAM(s) Oral daily  carbidopa/levodopa  25/100 1 Tablet(s) Oral daily  carvedilol 3.125 milliGRAM(s) Oral every 12 hours  fluticasone propionate 50 MICROgram(s)/spray Nasal Spray 1 Spray(s) Both Nostrils daily  hydrALAZINE 50 milliGRAM(s) Oral every 8 hours  isosorbide   dinitrate Tablet (ISORDIL) 30 milliGRAM(s) Oral daily  levothyroxine 100 MICROGram(s) Oral daily  multivitamin 1 Tablet(s) Oral daily  pantoprazole    Tablet 40 milliGRAM(s) Oral before breakfast      Objective:    10-10 @ 07:01  -  10-10 @ 11:41  --------------------------------------------------------  IN: 420 mL / OUT: 0 mL / NET: 420 mL      T(C): 36.6 (10-10-17 @ 10:34), Max: 37.1 (10-09-17 @ 17:04)  HR: 56 (10-10-17 @ 10:34) (56 - 79)  BP: 141/84 (10-10-17 @ 10:34) (101/56 - 159/45)  RR: 16 (10-10-17 @ 10:34) (16 - 28)  SpO2: 94% (10-10-17 @ 10:34) (92% - 100%)  Wt(kg): --      Physical Exam:  General: No acute distress  Eyes: sclera anicteric, pupils equal and reactive to light  ENMT: buccal mucosa moist, pharynx not injected  Neck: supple, trachea midline  Lungs: Decreased no wheeze/rhonchi  Cardiovascular: regular rate and rhythm, S1 S2  Abdomen: soft, nontender, no organomegaly present, bowel sounds normal  Neurological: alert and oriented x3, Cranial Nerves II-XII grossly intact  Skin: no increased ecchymosis/petechiae/purpura  Lymph Nodes: no palpable cervical/supraclavicular lymph nodes enlargements  Extremities: no cyanosis/clubbing/edema        LABS:                          11.3   10.2  )-----------( 115      ( 10 Oct 2017 07:13 )             37.2       10-10    141  |  106  |  52<H>  ----------------------------<  90  4.4   |  27  |  1.73<H>    Ca    8.7      10 Oct 2017 07:13          ABG - ( 09 Oct 2017 15:13 )  pH: x     /  pCO2: 25    /  pO2: 59    / HCO3: 24    / Base Excess: -1.0  /  SaO2: 92            RADIOLOGY & ADDITIONAL STUDIES:  EXAM:  CHEST-PORTABLE URGENT                                  PROCEDURE DATE:  10/09/2017          INTERPRETATION:  History: Shortness of breath    Portable AP radiograph of the chest is performed. comparison is made to   10/2/2017.    The cardiomediastinal silhouette is normal. There are bilateral pleural   effusions decreased on the left. There is diffuse osteopenia of the bony   structures.    Impression: Decrease in left pleural effusion. Stable right pleural   effusion.        Assessment :   89 yr old with PMH of Dementia, severe AS, admitted with SOB   COPD/CHF exacerbation  Asymptomatic bacteruria with ESBL EColi  No clinical evidence for UTI  CHAVO   Clinically stable    Plan :   No indication for antibiotic  Stable from ID standpoint  Will sign off as no active ID issues    Continue with present regiment.  Appropriate use of antibiotics and adverse effects reviewed.    > 25 minutes were spent in direct patient care reviewing notes, medications ,labs data/ imaging , discussion with multidisciplinary team.    Thank you for allowing me to participate in care of your patient .    Darien Boateng MD  Infectious Disease  [phone] LATOSHA MOONEY is a 89yFemale , patient examined and chart reviewed.     INTERVAL HPI/ OVERNIGHT EVENTS:   No events. Afebrile.    PAST MEDICAL & SURGICAL HISTORY:  CLL (chronic lymphocytic leukemia)  Dementia with behavioral disturbance, unspecified dementia type  Gastrointestinal hemorrhage, unspecified gastrointestinal hemorrhage type  Nontraumatic intracerebral hemorrhage, unspecified cerebral location, unspecified laterality  Rhinitis  Anxiety  GERD (gastroesophageal reflux disease)  Hypothyroidism  Coronary artery disease  COPD (chronic obstructive pulmonary disease)  Closed fracture of neck of left femur, initial encounter      For details regarding the patient's social history, family history, and other miscellaneous elements, please refer the initial infectious diseases consultation and/or the admitting history and physical examination for this admission.    ROS:  CONSTITUTIONAL:  Negative fever or chills, feels well, good appetite  EYES:  Negative  blurry vision or double vision  CARDIOVASCULAR:  Negative for chest pain or palpitations  RESPIRATORY:  Negative for cough, wheezing, or SOB   GASTROINTESTINAL:  Negative for nausea, vomiting, diarrhea, constipation, or abdominal pain  GENITOURINARY:  Negative frequency, urgency or dysuria  NEUROLOGIC:   + confusion, No headache, dizziness, lightheadedness  All other systems were reviewed and are negative     IV Contrast (Unknown)  penicillin (Unknown)      Current inpatient medications :    ANTIBIOTICS/RELEVANT:  acetaminophen   Tablet 650 milliGRAM(s) Oral every 6 hours PRN  ALBUTerol    0.083% 2.5 milliGRAM(s) Nebulizer every 6 hours PRN  buPROPion XL . 150 milliGRAM(s) Oral daily  carbidopa/levodopa  25/100 1 Tablet(s) Oral daily  carvedilol 3.125 milliGRAM(s) Oral every 12 hours  fluticasone propionate 50 MICROgram(s)/spray Nasal Spray 1 Spray(s) Both Nostrils daily  hydrALAZINE 50 milliGRAM(s) Oral every 8 hours  isosorbide   dinitrate Tablet (ISORDIL) 30 milliGRAM(s) Oral daily  levothyroxine 100 MICROGram(s) Oral daily  multivitamin 1 Tablet(s) Oral daily  pantoprazole    Tablet 40 milliGRAM(s) Oral before breakfast      Objective:    10-10 @ 07:01  -  10-10 @ 11:41  --------------------------------------------------------  IN: 420 mL / OUT: 0 mL / NET: 420 mL      T(C): 36.6 (10-10-17 @ 10:34), Max: 37.1 (10-09-17 @ 17:04)  HR: 56 (10-10-17 @ 10:34) (56 - 79)  BP: 141/84 (10-10-17 @ 10:34) (101/56 - 159/45)  RR: 16 (10-10-17 @ 10:34) (16 - 28)  SpO2: 94% (10-10-17 @ 10:34) (92% - 100%)  Wt(kg): --      Physical Exam:  General: No acute distress  Eyes: sclera anicteric, pupils equal and reactive to light  ENMT: buccal mucosa moist, pharynx not injected  Neck: supple, trachea midline  Lungs: Decreased no wheeze/rhonchi  Cardiovascular: regular rate and rhythm, S1 S2  Abdomen: soft, nontender, no organomegaly present, bowel sounds normal  Neurological: alert and oriented x3, Cranial Nerves II-XII grossly intact  Skin: no increased ecchymosis/petechiae/purpura  Lymph Nodes: no palpable cervical/supraclavicular lymph nodes enlargements  Extremities: no cyanosis/clubbing/edema        LABS:                          11.3   10.2  )-----------( 115      ( 10 Oct 2017 07:13 )             37.2       10-10    141  |  106  |  52<H>  ----------------------------<  90  4.4   |  27  |  1.73<H>    Ca    8.7      10 Oct 2017 07:13          ABG - ( 09 Oct 2017 15:13 )  pH: x     /  pCO2: 25    /  pO2: 59    / HCO3: 24    / Base Excess: -1.0  /  SaO2: 92            RADIOLOGY & ADDITIONAL STUDIES:  EXAM:  CHEST-PORTABLE URGENT                                  PROCEDURE DATE:  10/09/2017          INTERPRETATION:  History: Shortness of breath    Portable AP radiograph of the chest is performed. comparison is made to   10/2/2017.    The cardiomediastinal silhouette is normal. There are bilateral pleural   effusions decreased on the left. There is diffuse osteopenia of the bony   structures.    Impression: Decrease in left pleural effusion. Stable right pleural   effusion.        Assessment :   89 yr old with PMH of Dementia, severe AS, admitted with SOB   COPD/CHF exacerbation  Asymptomatic bacteruria with ESBL EColi  No clinical evidence for UTI  CHAVO   Clinically stable    Plan :   No indication for antibiotic  Stable from ID standpoint    Continue with present regiment.  Appropriate use of antibiotics and adverse effects reviewed.    > 25 minutes were spent in direct patient care reviewing notes, medications ,labs data/ imaging , discussion with multidisciplinary team.    Thank you for allowing me to participate in care of your patient .    Darien Boateng MD  Infectious Disease  [phone]

## 2017-10-10 NOTE — PROGRESS NOTE ADULT - SUBJECTIVE AND OBJECTIVE BOX
Patient is a 89y old  Female who presents with a chief complaint of Dysnea (09 Oct 2017 11:24)      INTERVAL HPI/OVERNIGHT EVENTS:had an episode of unresponsiveness and was alkalotic on blood gas    MEDICATIONS  (STANDING):  buPROPion XL . 150 milliGRAM(s) Oral daily  carbidopa/levodopa  25/100 1 Tablet(s) Oral daily  carvedilol 3.125 milliGRAM(s) Oral every 12 hours  fluticasone propionate 50 MICROgram(s)/spray Nasal Spray 1 Spray(s) Both Nostrils daily  hydrALAZINE 50 milliGRAM(s) Oral every 8 hours  isosorbide   dinitrate Tablet (ISORDIL) 30 milliGRAM(s) Oral daily  levothyroxine 100 MICROGram(s) Oral daily  multivitamin 1 Tablet(s) Oral daily  pantoprazole    Tablet 40 milliGRAM(s) Oral before breakfast  potassium chloride    Tablet ER 10 milliEquivalent(s) Oral daily    MEDICATIONS  (PRN):  acetaminophen   Tablet 650 milliGRAM(s) Oral every 6 hours PRN moderate pain 4-6  ALBUTerol    0.083% 2.5 milliGRAM(s) Nebulizer every 6 hours PRN Shortness of Breath and/or Wheezing      Allergies    IV Contrast (Unknown)  penicillin (Unknown)    Intolerances          Vital Signs Last 24 Hrs  T(C): 13.8 (10 Oct 2017 05:12), Max: 37.1 (09 Oct 2017 17:04)  T(F): 56.8 (10 Oct 2017 05:12), Max: 98.8 (09 Oct 2017 17:04)  HR: 64 (10 Oct 2017 05:12) (64 - 79)  BP: 159/45 (10 Oct 2017 05:12) (101/56 - 159/45)  BP(mean): --  RR: 18 (10 Oct 2017 05:12) (16 - 28)  SpO2: 95% (10 Oct 2017 05:12) (92% - 100%)    LABS:                        11.3   10.2  )-----------( 115      ( 10 Oct 2017 07:13 )             37.2     10-10    141  |  106  |  52<H>  ----------------------------<  90  4.4   |  27  |  1.73<H>    Ca    8.7      10 Oct 2017 07:13          CAPILLARY BLOOD GLUCOSE              10-10 @ 07:01  -  10-10 @ 09:38  --------------------------------------------------------  IN: 420 mL / OUT: 0 mL / NET: 420 mL    < from: Xray Chest 1 View AP- PORTABLE-Urgent (10.09.17 @ 15:21) >  History: Shortness of breath    Portable AP radiograph of the chest is performed. comparison is made to   10/2/2017.    The cardiomediastinal silhouette is normal. There are bilateral pleural   effusions decreased on the left. There is diffuse osteopenia of the bony   structures.    Impression: Decrease in left pleural effusion. Stable right pleural   effusion.      < end of copied text >        RADIOLOGY & ADDITIONAL TESTS:    Imaging Personally Reviewed:  [x ] YES  [ ] NO    Consultant(s) Notes Reviewed:  [ x] YES  [ ] NO    Care Discussed with Consultants/Other Providers [x ] YES  [ ] NO

## 2017-10-10 NOTE — PROGRESS NOTE ADULT - ASSESSMENT
89 yr old with PMH of Dementia,depression,severe aortic incoprtence, chf, CVA- haemorruagic stroke in 2005,severe GI bleed 4 yrs back,pleural effusion, gait disbility following CVA,possible parkinsonafter CVa,hypothyroid, lactose intolerant,allergic to penicillin and contrast dye,recently treated with levofloxacin for possible lung infection, sent from assisted living for SOB, as per son pt does have intermittent episodes of SOB, and is usually better if stays upright, and possible also chokes on food, loosing weight , takes puree diet , no fever, no loss of appetite, ambulates with walker , no bowel complaint,    history obtained from SON who is a podiatry  Pt has living will is full code at  present  admitted with SOB has elevated pro BNP, with b/l pleural effusion, most likely acute exacerbation of CHF diastolic with aortic insufficiency,h/o CVA, frailitity, ambulatory disabilty , with possible dysphagia, arf, has ESBL UTI, received invanz one dose had ID consult and as asymptomatic likely asymptomatic bacteruria with ESBL e coli, abx stopped,  echo b/l atrial enlargement with severe aortic incompetence, ARF with diuresis likely, nephro f up, hydartion to continue, trend bmp, trending down,CKD 3, DC plan to assisted living , f up with pcp.  hypothyroidism stable.GERD stable, dysphagi- continue dysphagia diet and aspiration precaution.  had episode of unresponsiveness on 10/9 and was had alkalosis as per blood gas. improved in short time spontaneously. lasix stopped  TSH is high , levothyroxin dose increased.

## 2017-10-10 NOTE — PROGRESS NOTE ADULT - SUBJECTIVE AND OBJECTIVE BOX
Date/Time Patient Seen:  		  Referring MD:   Data Reviewed	       Patient is a 89y old  Female who presents with a chief complaint of Dysnea (09 Oct 2017 11:24)  in bed  seen and examined  vs and meds reviewed        Subjective/HPI     PAST MEDICAL & SURGICAL HISTORY:  CLL (chronic lymphocytic leukemia)  Dementia with behavioral disturbance, unspecified dementia type  Gastrointestinal hemorrhage, unspecified gastrointestinal hemorrhage type  Nontraumatic intracerebral hemorrhage, unspecified cerebral location, unspecified laterality  Rhinitis  Anxiety  GERD (gastroesophageal reflux disease)  Hypothyroidism  Coronary artery disease  COPD (chronic obstructive pulmonary disease)  Parkinson's disease  Closed fracture of neck of left femur, initial encounter        Medication list         MEDICATIONS  (STANDING):  buPROPion XL . 150 milliGRAM(s) Oral daily  carbidopa/levodopa  25/100 1 Tablet(s) Oral daily  carvedilol 3.125 milliGRAM(s) Oral every 12 hours  fluticasone propionate 50 MICROgram(s)/spray Nasal Spray 1 Spray(s) Both Nostrils daily  hydrALAZINE 50 milliGRAM(s) Oral every 8 hours  isosorbide   dinitrate Tablet (ISORDIL) 30 milliGRAM(s) Oral daily  levothyroxine 75 MICROGram(s) Oral daily  multivitamin 1 Tablet(s) Oral daily  pantoprazole    Tablet 40 milliGRAM(s) Oral before breakfast  potassium chloride    Tablet ER 10 milliEquivalent(s) Oral daily    MEDICATIONS  (PRN):  acetaminophen   Tablet 650 milliGRAM(s) Oral every 6 hours PRN moderate pain 4-6  ALBUTerol    0.083% 2.5 milliGRAM(s) Nebulizer every 6 hours PRN Shortness of Breath and/or Wheezing         Vitals log        ICU Vital Signs Last 24 Hrs  T(C): 13.8 (10 Oct 2017 05:12), Max: 37.1 (09 Oct 2017 17:04)  T(F): 56.8 (10 Oct 2017 05:12), Max: 98.8 (09 Oct 2017 17:04)  HR: 64 (10 Oct 2017 05:12) (64 - 79)  BP: 159/45 (10 Oct 2017 05:12) (101/56 - 159/45)  BP(mean): --  ABP: --  ABP(mean): --  RR: 18 (10 Oct 2017 05:12) (16 - 28)  SpO2: 95% (10 Oct 2017 05:12) (92% - 100%)           Input and Output:  I&O's Detail      Lab Data                        12.0   11.6  )-----------( 106      ( 09 Oct 2017 07:52 )             38.9     10-09    139  |  104  |  48<H>  ----------------------------<  91  3.9   |  26  |  1.44<H>    Ca    8.7      09 Oct 2017 07:52      ABG - ( 09 Oct 2017 15:13 )  pH: x     /  pCO2: 25    /  pO2: 59    / HCO3: 24    / Base Excess: -1.0  /  SaO2: 92                      Review of Systems	      Objective     Physical Examination    head at  heart - s1s2  lungs - dec BS  abd - soft      Pertinent Lab findings & Imaging      Desire:  NO   Adequate UO     I&O's Detail           Discussed with:     Cultures:	        Radiology

## 2017-10-10 NOTE — PROGRESS NOTE ADULT - ASSESSMENT
89 year old female, history dementia, CLL, ?aortic stenosis, pleural effuisons, s/p CVA. Brought to ER for shortness of breath. Chronic? bilateral effusions  noted. Originally given IV Lasix with relief.  Now feeling better.     Echocardiogram compatible with moderate/severe AI.    Mild tricuspid insufficiency and moderate MR.    10/10/17   Condition essentially unchanged.  Patient awake and alert.  Eating breakfast.  No complaints offered.  "I'm feeling better today."  Yesterday had possible brief episode of unconsciousness.  No significant arrhythmias noted.    Plan:  - Continue present therapy.  - Ambulate with assistance if possible.  - Being followed by Pulmonary and Renal.  - D/C planning.

## 2017-10-10 NOTE — PROGRESS NOTE ADULT - SUBJECTIVE AND OBJECTIVE BOX
Patient is a 89y Female with a known history of :  Acute renal failure, unspecified acute renal failure type (N17.9)  CKD (chronic kidney disease) (N18.9)  Aortic insufficiency (I35.1)  ESBL (extended spectrum beta-lactamase) producing bacteria infection (A49.9)  COPD (chronic obstructive pulmonary disease) (J44.9)  CLL (chronic lymphocytic leukemia) (C91.10)  Aortic stenosis (I35.0)  Coronary artery disease (I25.10)  Hypothyroidism (E03.9)  Pleural effusion (J90)  Dysphagia, unspecified type (R13.10)  Gastroesophageal reflux disease without esophagitis (K21.9)  Hypothyroidism, unspecified type (E03.9)  Chronic obstructive pulmonary disease, unspecified COPD type (J44.9)  Dementia with behavioral disturbance, unspecified dementia type (F03.91)  Congestive heart failure, unspecified congestive heart failure chronicity, unspecified congestive heart failure type (I50.9)    HPI:  89 yr old with PMH of Dementia,depression,severe aortic stenosis, chf, CVA- haemorruagic stroke in 2005,severe GI bleed 4 yrs back,pleural effusion, gait disbility following CVA,possible parkinsonafter CVa,hypothyroid, lactose intolerant,allergic to penicillin and contrast dye,recently treated with levofloxacin for possible lung infection, sent from assisted living for SOB, as per son pt does have intermittent episodes of SOB, and is usually better if stays upright, and possible also chokes on food, loosing weight , takes puree diet , no fever, no loss of appetite, ambulates with walker , no bowel complaint,    history obtained from SON who is a podiatry  Pt has living will is full code at  present (01 Oct 2017 10:07)      REVIEW OF SYSTEMS:    CONSTITUTIONAL: No fever, weight loss, or fatigue  EYES: No eye pain, visual disturbances, or discharge  ENMT:  No difficulty hearing, tinnitus, vertigo; No sinus or throat pain  NECK: No pain or stiffness  BREASTS: No pain, masses, or nipple discharge  RESPIRATORY: No cough, wheezing, chills or hemoptysis; No shortness of breath  CARDIOVASCULAR: No chest pain, palpitations, dizziness, or leg swelling  GASTROINTESTINAL: No abdominal or epigastric pain. No nausea, vomiting, or hematemesis; No diarrhea or constipation. No melena or hematochezia.  GENITOURINARY: No dysuria, frequency, hematuria, or incontinence  NEUROLOGICAL: No headaches, memory loss, loss of strength, numbness, or tremors  SKIN: No itching, burning, rashes, or lesions   LYMPH NODES: No enlarged glands  ENDOCRINE: No heat or cold intolerance; No hair loss  MUSCULOSKELETAL: No joint pain or swelling; No muscle, back, or extremity pain  PSYCHIATRIC: No depression, anxiety, mood swings, or difficulty sleeping  HEME/LYMPH: No easy bruising, or bleeding gums  ALLERGY AND IMMUNOLOGIC: No hives or eczema    MEDICATIONS  (STANDING):  buPROPion XL . 150 milliGRAM(s) Oral daily  carbidopa/levodopa  25/100 1 Tablet(s) Oral daily  carvedilol 3.125 milliGRAM(s) Oral every 12 hours  fluticasone propionate 50 MICROgram(s)/spray Nasal Spray 1 Spray(s) Both Nostrils daily  hydrALAZINE 50 milliGRAM(s) Oral every 8 hours  isosorbide   dinitrate Tablet (ISORDIL) 30 milliGRAM(s) Oral daily  levothyroxine 75 MICROGram(s) Oral daily  multivitamin 1 Tablet(s) Oral daily  pantoprazole    Tablet 40 milliGRAM(s) Oral before breakfast  potassium chloride    Tablet ER 10 milliEquivalent(s) Oral daily    MEDICATIONS  (PRN):  acetaminophen   Tablet 650 milliGRAM(s) Oral every 6 hours PRN moderate pain 4-6  ALBUTerol    0.083% 2.5 milliGRAM(s) Nebulizer every 6 hours PRN Shortness of Breath and/or Wheezing      ALLERGIES: IV Contrast (Unknown)  penicillin (Unknown)      FAMILY HISTORY:      Social history:  Alochol:   Smoking:   Drug Use:   Marital Status:     PHYSICAL EXAMINATION:  -----------------------------  T(C): 13.8 (10-10-17 @ 05:12), Max: 37.1 (10-09-17 @ 17:04)  HR: 64 (10-10-17 @ 05:12) (64 - 79)  BP: 159/45 (10-10-17 @ 05:12) (101/56 - 159/45)  RR: 18 (10-10-17 @ 05:12) (16 - 28)  SpO2: 95% (10-10-17 @ 05:12) (92% - 100%)  Wt(kg): --        Constitutional: well developed, normal appearance, well groomed, well nourished, no deformities and no acute distress.   Eyes: the conjunctiva exhibited no abnormalities and the eyelids demonstrated no xanthelasmas.   HEENT: normal oral mucosa, no oral pallor and no oral cyanosis.   Neck: normal jugular venous A waves present, normal jugular venous V waves present and no jugular venous leong A waves.   Pulmonary: no respiratory distress, normal respiratory rhythm and effort, no accessory muscle use and lungs were clear to auscultation bilaterally.   Cardiovascular: heart rate and rhythm were normal, normal S1 and S2 and no murmur, gallop, rub, heave or thrill are present.   Musculoskeletal: the gait could not be assessed.  Extremities: no clubbing of the fingernails, no localized cyanosis, no petechial hemorrhages and no ischemic changes.   Skin: normal skin color and pigmentation, no rash, no venous stasis, no skin lesions, no skin ulcer and no xanthoma was observed.     LABS:   --------  10-10    141  |  106  |  52<H>  ----------------------------<  90  4.4   |  27  |  1.73<H>    Ca    8.7      10 Oct 2017 07:13                           11.3   10.2  )-----------( 115      ( 10 Oct 2017 07:13 )             37.2                   Radiology:

## 2017-10-10 NOTE — PROGRESS NOTE ADULT - ASSESSMENT
·	CHAVO: On diuresis and ACEI rx.  ·	Hypertension  ·	CHF    Mild increase in Bun/crea. ? Prerenal azotemia. Off lasix now. Will stop potassium tabs. Encourage PO intake. Stable renal function.  BP acceptable. Salt restriction. D/c planning.

## 2017-10-11 DIAGNOSIS — I25.10 ATHEROSCLEROTIC HEART DISEASE OF NATIVE CORONARY ARTERY WITHOUT ANGINA PECTORIS: ICD-10-CM

## 2017-10-11 LAB
ANION GAP SERPL CALC-SCNC: 8 MMOL/L — SIGNIFICANT CHANGE UP (ref 5–17)
BUN SERPL-MCNC: 47 MG/DL — HIGH (ref 7–23)
CALCIUM SERPL-MCNC: 9 MG/DL — SIGNIFICANT CHANGE UP (ref 8.4–10.5)
CHLORIDE SERPL-SCNC: 105 MMOL/L — SIGNIFICANT CHANGE UP (ref 96–108)
CO2 SERPL-SCNC: 27 MMOL/L — SIGNIFICANT CHANGE UP (ref 22–31)
CREAT SERPL-MCNC: 1.53 MG/DL — HIGH (ref 0.5–1.3)
GLUCOSE SERPL-MCNC: 88 MG/DL — SIGNIFICANT CHANGE UP (ref 70–99)
HCT VFR BLD CALC: 36.7 % — SIGNIFICANT CHANGE UP (ref 34.5–45)
HGB BLD-MCNC: 11.3 G/DL — LOW (ref 11.5–15.5)
MCHC RBC-ENTMCNC: 29.2 PG — SIGNIFICANT CHANGE UP (ref 27–34)
MCHC RBC-ENTMCNC: 30.9 GM/DL — LOW (ref 32–36)
MCV RBC AUTO: 94.5 FL — SIGNIFICANT CHANGE UP (ref 80–100)
PLATELET # BLD AUTO: 94 K/UL — LOW (ref 150–400)
POTASSIUM SERPL-MCNC: 4.1 MMOL/L — SIGNIFICANT CHANGE UP (ref 3.5–5.3)
POTASSIUM SERPL-SCNC: 4.1 MMOL/L — SIGNIFICANT CHANGE UP (ref 3.5–5.3)
RBC # BLD: 3.89 M/UL — SIGNIFICANT CHANGE UP (ref 3.8–5.2)
RBC # FLD: 15.3 % — HIGH (ref 10.3–14.5)
SODIUM SERPL-SCNC: 140 MMOL/L — SIGNIFICANT CHANGE UP (ref 135–145)
WBC # BLD: 11.6 K/UL — HIGH (ref 3.8–10.5)
WBC # FLD AUTO: 11.6 K/UL — HIGH (ref 3.8–10.5)

## 2017-10-11 RX ORDER — LEVOTHYROXINE SODIUM 125 MCG
1 TABLET ORAL
Qty: 30 | Refills: 0 | OUTPATIENT
Start: 2017-10-11 | End: 2017-11-10

## 2017-10-11 RX ORDER — POTASSIUM CHLORIDE 20 MEQ
1 PACKET (EA) ORAL
Qty: 0 | Refills: 0 | COMMUNITY

## 2017-10-11 RX ORDER — LEVOTHYROXINE SODIUM 125 MCG
1 TABLET ORAL
Qty: 0 | Refills: 0 | COMMUNITY

## 2017-10-11 RX ADMIN — ISOSORBIDE DINITRATE 30 MILLIGRAM(S): 5 TABLET ORAL at 11:46

## 2017-10-11 RX ADMIN — CARVEDILOL PHOSPHATE 3.12 MILLIGRAM(S): 80 CAPSULE, EXTENDED RELEASE ORAL at 06:10

## 2017-10-11 RX ADMIN — BUPROPION HYDROCHLORIDE 150 MILLIGRAM(S): 150 TABLET, EXTENDED RELEASE ORAL at 11:46

## 2017-10-11 RX ADMIN — Medication 50 MILLIGRAM(S): at 06:10

## 2017-10-11 RX ADMIN — Medication 50 MILLIGRAM(S): at 22:05

## 2017-10-11 RX ADMIN — Medication 100 MICROGRAM(S): at 06:10

## 2017-10-11 RX ADMIN — Medication 1 TABLET(S): at 11:46

## 2017-10-11 RX ADMIN — CARVEDILOL PHOSPHATE 3.12 MILLIGRAM(S): 80 CAPSULE, EXTENDED RELEASE ORAL at 17:39

## 2017-10-11 RX ADMIN — Medication 50 MILLIGRAM(S): at 14:37

## 2017-10-11 RX ADMIN — Medication 1 SPRAY(S): at 11:46

## 2017-10-11 RX ADMIN — CARBIDOPA AND LEVODOPA 1 TABLET(S): 25; 100 TABLET ORAL at 11:46

## 2017-10-11 RX ADMIN — PANTOPRAZOLE SODIUM 40 MILLIGRAM(S): 20 TABLET, DELAYED RELEASE ORAL at 06:10

## 2017-10-11 NOTE — PROGRESS NOTE ADULT - ASSESSMENT
·	CHAVO: On diuresis and ACEI rx.  ·	Hypertension  ·	CHF    Improving creatinine. Encourage PO intake. Labile BP. Titrate BP meds as outpt as needed. Salt restriction. D/c planning.

## 2017-10-11 NOTE — PROGRESS NOTE ADULT - SUBJECTIVE AND OBJECTIVE BOX
Patient is a 89y Female with a known history of :  Coronary artery disease involving native heart without angina pectoris, unspecified vessel or lesion type (I25.10)  Acute renal failure, unspecified acute renal failure type (N17.9)  CKD (chronic kidney disease) (N18.9)  Aortic insufficiency (I35.1)  ESBL (extended spectrum beta-lactamase) producing bacteria infection (A49.9)  COPD (chronic obstructive pulmonary disease) (J44.9)  CLL (chronic lymphocytic leukemia) (C91.10)  Aortic stenosis (I35.0)  Coronary artery disease (I25.10)  Hypothyroidism (E03.9)  Pleural effusion (J90)  Dysphagia, unspecified type (R13.10)  Gastroesophageal reflux disease without esophagitis (K21.9)  Hypothyroidism, unspecified type (E03.9)  Chronic obstructive pulmonary disease, unspecified COPD type (J44.9)  Dementia with behavioral disturbance, unspecified dementia type (F03.91)  Congestive heart failure, unspecified congestive heart failure chronicity, unspecified congestive heart failure type (I50.9)    HPI:  89 yr old with PMH of Dementia,depression,severe aortic stenosis, chf, CVA- haemorruagic stroke in 2005,severe GI bleed 4 yrs back,pleural effusion, gait disbility following CVA,possible parkinsonafter CVa,hypothyroid, lactose intolerant,allergic to penicillin and contrast dye,recently treated with levofloxacin for possible lung infection, sent from assisted living for SOB, as per son pt does have intermittent episodes of SOB, and is usually better if stays upright, and possible also chokes on food, loosing weight , takes puree diet , no fever, no loss of appetite, ambulates with walker , no bowel complaint,    history obtained from SON who is a podiatry  Pt has living will is full code at  present (01 Oct 2017 10:07)        MEDICATIONS  (STANDING):  buPROPion XL . 150 milliGRAM(s) Oral daily  carbidopa/levodopa  25/100 1 Tablet(s) Oral daily  carvedilol 3.125 milliGRAM(s) Oral every 12 hours  fluticasone propionate 50 MICROgram(s)/spray Nasal Spray 1 Spray(s) Both Nostrils daily  hydrALAZINE 50 milliGRAM(s) Oral every 8 hours  isosorbide   dinitrate Tablet (ISORDIL) 30 milliGRAM(s) Oral daily  levothyroxine 100 MICROGram(s) Oral daily  multivitamin 1 Tablet(s) Oral daily  pantoprazole    Tablet 40 milliGRAM(s) Oral before breakfast    MEDICATIONS  (PRN):  acetaminophen   Tablet 650 milliGRAM(s) Oral every 6 hours PRN moderate pain 4-6  ALBUTerol    0.083% 2.5 milliGRAM(s) Nebulizer every 6 hours PRN Shortness of Breath and/or Wheezing      ALLERGIES: IV Contrast (Unknown)  penicillin (Unknown)      FAMILY HISTORY:      Social history:  Alochol:   Smoking:   Drug Use:   Marital Status:     PHYSICAL EXAMINATION:  -----------------------------  T(C): 36.4 (10-11-17 @ 05:37), Max: 37.3 (10-10-17 @ 17:05)  HR: 58 (10-11-17 @ 05:37) (56 - 87)  BP: 159/66 (10-11-17 @ 05:37) (98/53 - 159/66)  RR: 18 (10-11-17 @ 05:37) (16 - 18)  SpO2: 95% (10-11-17 @ 05:37) (92% - 98%)  Wt(kg): --    10-10 @ 07:01  -  10-11 @ 07:00  --------------------------------------------------------  IN:    Oral Fluid: 420 mL  Total IN: 420 mL    OUT:  Total OUT: 0 mL    Total NET: 420 mL            Constitutional: frail  Eyes: the conjunctiva exhibited no abnormalities and the eyelids demonstrated no xanthelasmas.   HEENT: normal oral mucosa, no oral pallor and no oral cyanosis.   Neck: normal jugular venous A waves present, normal jugular venous V waves present and no jugular venous leong A waves.   Pulmonary: fair air flow bilat   Cardiovascular: heart rate and rhythm were normal, normal S1 and S2 distant  Musculoskeletal: the gait could not be assessed..   Extremities: no clubbing of the fingernails, no localized cyanosis, no petechial hemorrhages and no ischemic changes.   Skin: normal skin color and pigmentation, no rash, no venous stasis, no skin lesions, no skin ulcer and no xanthoma was observed.   Psychiatric:  confused    LABS:   --------  CBC Full  -  ( 11 Oct 2017 07:46 )  WBC Count : 11.6 K/uL  Hemoglobin : 11.3 g/dL  Hematocrit : 36.7 %  Platelet Count - Automated : 94 K/uL  Mean Cell Volume : 94.5 fl  Mean Cell Hemoglobin : 29.2 pg  Mean Cell Hemoglobin Concentration : 30.9 gm/dL  Auto Neutrophil # : x  Auto Lymphocyte # : x  Auto Monocyte # : x  Auto Eosinophil # : x  Auto Basophil # : x  Auto Neutrophil % : x  Auto Lymphocyte % : x  Auto Monocyte % : x  Auto Eosinophil % : x  Auto Basophil % : x      10-11    140  |  105  |  47<H>  ----------------------------<  88  4.1   |  27  |  1.53<H>    Ca    9.0      11 Oct 2017 07:46         10/11/2017:  On Tele = NSR rare ectopy  Sitting up in bed eating breakfast.  Confused              Radiology:

## 2017-10-11 NOTE — PROGRESS NOTE ADULT - SUBJECTIVE AND OBJECTIVE BOX
Date/Time Patient Seen:  		  Referring MD:   Data Reviewed	       Patient is a 89y old  Female who presents with a chief complaint of Dysnea (09 Oct 2017 11:24)  in bed  seen and examined  vs and meds reviewed        Subjective/HPI     PAST MEDICAL & SURGICAL HISTORY:  CLL (chronic lymphocytic leukemia)  Dementia with behavioral disturbance, unspecified dementia type  Gastrointestinal hemorrhage, unspecified gastrointestinal hemorrhage type  Nontraumatic intracerebral hemorrhage, unspecified cerebral location, unspecified laterality  Rhinitis  Anxiety  GERD (gastroesophageal reflux disease)  Hypothyroidism  Coronary artery disease  COPD (chronic obstructive pulmonary disease)  Parkinson's disease  Closed fracture of neck of left femur, initial encounter        Medication list         MEDICATIONS  (STANDING):  buPROPion XL . 150 milliGRAM(s) Oral daily  carbidopa/levodopa  25/100 1 Tablet(s) Oral daily  carvedilol 3.125 milliGRAM(s) Oral every 12 hours  fluticasone propionate 50 MICROgram(s)/spray Nasal Spray 1 Spray(s) Both Nostrils daily  hydrALAZINE 50 milliGRAM(s) Oral every 8 hours  isosorbide   dinitrate Tablet (ISORDIL) 30 milliGRAM(s) Oral daily  levothyroxine 100 MICROGram(s) Oral daily  multivitamin 1 Tablet(s) Oral daily  pantoprazole    Tablet 40 milliGRAM(s) Oral before breakfast    MEDICATIONS  (PRN):  acetaminophen   Tablet 650 milliGRAM(s) Oral every 6 hours PRN moderate pain 4-6  ALBUTerol    0.083% 2.5 milliGRAM(s) Nebulizer every 6 hours PRN Shortness of Breath and/or Wheezing         Vitals log        ICU Vital Signs Last 24 Hrs  T(C): 36.4 (11 Oct 2017 05:37), Max: 37.3 (10 Oct 2017 17:05)  T(F): 97.5 (11 Oct 2017 05:37), Max: 99.2 (10 Oct 2017 17:05)  HR: 58 (11 Oct 2017 05:37) (56 - 87)  BP: 159/66 (11 Oct 2017 05:37) (98/53 - 159/66)  BP(mean): --  ABP: --  ABP(mean): --  RR: 18 (11 Oct 2017 05:37) (16 - 18)  SpO2: 95% (11 Oct 2017 05:37) (92% - 98%)           Input and Output:  I&O's Detail    10 Oct 2017 07:01  -  11 Oct 2017 06:57  --------------------------------------------------------  IN:    Oral Fluid: 420 mL  Total IN: 420 mL    OUT:  Total OUT: 0 mL    Total NET: 420 mL          Lab Data                        11.3   10.2  )-----------( 115      ( 10 Oct 2017 07:13 )             37.2     10-10    141  |  106  |  52<H>  ----------------------------<  90  4.4   |  27  |  1.73<H>    Ca    8.7      10 Oct 2017 07:13      ABG - ( 09 Oct 2017 15:13 )  pH: x     /  pCO2: 25    /  pO2: 59    / HCO3: 24    / Base Excess: -1.0  /  SaO2: 92                      Review of Systems	      Objective     Physical Examination    head at  heart - s1s2  lungs - dec BS  abd - soft      Pertinent Lab findings & Imaging      Desire:  NO   Adequate UO     I&O's Detail    10 Oct 2017 07:01  -  11 Oct 2017 06:57  --------------------------------------------------------  IN:    Oral Fluid: 420 mL  Total IN: 420 mL    OUT:  Total OUT: 0 mL    Total NET: 420 mL               Discussed with:     Cultures:	        Radiology

## 2017-10-11 NOTE — PROGRESS NOTE ADULT - ASSESSMENT
89 yr old with PMH of Dementia,depression,severe aortic incoprtence, chf, CVA- haemorruagic stroke in 2005,severe GI bleed 4 yrs back,pleural effusion, gait disbility following CVA,possible parkinsonafter CVa,hypothyroid, lactose intolerant,allergic to penicillin and contrast dye,recently treated with levofloxacin for possible lung infection, sent from assisted living for SOB, as per son pt does have intermittent episodes of SOB, and is usually better if stays upright, and possible also chokes on food, loosing weight , takes puree diet , no fever, no loss of appetite, ambulates with walker , no bowel complaint,    history obtained from SON who is a podiatry  Pt has living will is full code at  present  admitted with SOB has elevated pro BNP, with b/l pleural effusion, most likely acute exacerbation of CHF diastolic with aortic insufficiency,h/o CVA, frailitity, ambulatory disabilty , with possible dysphagia, arf, has ESBL UTI, received invanz one dose had ID consult and as asymptomatic likely asymptomatic bacteruria with ESBL e coli, abx stopped,  echo b/l atrial enlargement with severe aortic incompetence, ARF with diuresis likely, nephro f up, hydartion to continue, trend bmp, trending down,CKD 3, DC plan to assisted living , f up with pcp.  hypothyroidism stable.GERD stable, dysphagi- continue dysphagia diet and aspiration precaution.  had episode of unresponsiveness on 10/9 and was had alkalosis as per blood gas. improved in short time spontaneously. lasix stopped  TSH is high , levothyroxin dose increased.  PT eval,DC plan

## 2017-10-11 NOTE — PROGRESS NOTE ADULT - SUBJECTIVE AND OBJECTIVE BOX
Patient is a 89y old  Female who presents with a chief complaint of Dysnea (09 Oct 2017 11:24)      INTERVAL HPI/OVERNIGHT EVENTS:no acute event    MEDICATIONS  (STANDING):  buPROPion XL . 150 milliGRAM(s) Oral daily  carbidopa/levodopa  25/100 1 Tablet(s) Oral daily  carvedilol 3.125 milliGRAM(s) Oral every 12 hours  fluticasone propionate 50 MICROgram(s)/spray Nasal Spray 1 Spray(s) Both Nostrils daily  hydrALAZINE 50 milliGRAM(s) Oral every 8 hours  isosorbide   dinitrate Tablet (ISORDIL) 30 milliGRAM(s) Oral daily  levothyroxine 100 MICROGram(s) Oral daily  multivitamin 1 Tablet(s) Oral daily  pantoprazole    Tablet 40 milliGRAM(s) Oral before breakfast    MEDICATIONS  (PRN):  acetaminophen   Tablet 650 milliGRAM(s) Oral every 6 hours PRN moderate pain 4-6  ALBUTerol    0.083% 2.5 milliGRAM(s) Nebulizer every 6 hours PRN Shortness of Breath and/or Wheezing      Allergies    IV Contrast (Unknown)  penicillin (Unknown)    Intolerances          Vital Signs Last 24 Hrs  T(C): 36.6 (11 Oct 2017 09:59), Max: 37.3 (10 Oct 2017 17:05)  T(F): 97.8 (11 Oct 2017 09:59), Max: 99.2 (10 Oct 2017 17:05)  HR: 54 (11 Oct 2017 09:59) (54 - 87)  BP: 119/52 (11 Oct 2017 09:59) (98/53 - 159/66)  BP(mean): --  RR: 18 (11 Oct 2017 09:59) (16 - 18)  SpO2: 98% (11 Oct 2017 11:13) (92% - 98%)    LABS:                        11.3   11.6  )-----------( 94       ( 11 Oct 2017 07:46 )             36.7     10-11    140  |  105  |  47<H>  ----------------------------<  88  4.1   |  27  |  1.53<H>    Ca    9.0      11 Oct 2017 07:46          CAPILLARY BLOOD GLUCOSE              10-10 @ 07:01  -  10-11 @ 07:00  --------------------------------------------------------  IN: 420 mL / OUT: 0 mL / NET: 420 mL          RADIOLOGY & ADDITIONAL TESTS:    Imaging Personally Reviewed:  [ x] YES  [ ] NO    Consultant(s) Notes Reviewed:  [x ] YES  [ ] NO    Care Discussed with Consultants/Other Providers [x ] YES  [ ] NO

## 2017-10-11 NOTE — PROGRESS NOTE ADULT - SUBJECTIVE AND OBJECTIVE BOX
LATOSHA MOONEY is a 89yFemale , patient examined and chart reviewed.     INTERVAL HPI/ OVERNIGHT EVENTS:   No events. Afebrile.    PAST MEDICAL & SURGICAL HISTORY:  CLL (chronic lymphocytic leukemia)  Dementia with behavioral disturbance, unspecified dementia type  Gastrointestinal hemorrhage, unspecified gastrointestinal hemorrhage type  Nontraumatic intracerebral hemorrhage, unspecified cerebral location, unspecified laterality  Rhinitis  Anxiety  GERD (gastroesophageal reflux disease)  Hypothyroidism  Coronary artery disease  COPD (chronic obstructive pulmonary disease)  Closed fracture of neck of left femur, initial encounter      For details regarding the patient's social history, family history, and other miscellaneous elements, please refer the initial infectious diseases consultation and/or the admitting history and physical examination for this admission.    ROS:  CONSTITUTIONAL:  Negative fever or chills,  EYES:  Negative  blurry vision or double vision  CARDIOVASCULAR:  Negative for chest pain or palpitations  RESPIRATORY:  Negative for cough, wheezing, or SOB   GASTROINTESTINAL:  Negative for nausea, vomiting, diarrhea, constipation, or abdominal pain  GENITOURINARY:  Negative frequency, urgency or dysuria  NEUROLOGIC:  No headache, confusion, dizziness, lightheadedness  All other systems were reviewed and are negative     IV Contrast (Unknown)  penicillin (Unknown)      Current inpatient medications :    ANTIBIOTICS/RELEVANT:  NONE    acetaminophen   Tablet 650 milliGRAM(s) Oral every 6 hours PRN  ALBUTerol    0.083% 2.5 milliGRAM(s) Nebulizer every 6 hours PRN  buPROPion XL . 150 milliGRAM(s) Oral daily  carbidopa/levodopa  25/100 1 Tablet(s) Oral daily  carvedilol 3.125 milliGRAM(s) Oral every 12 hours  fluticasone propionate 50 MICROgram(s)/spray Nasal Spray 1 Spray(s) Both Nostrils daily  hydrALAZINE 50 milliGRAM(s) Oral every 8 hours  isosorbide   dinitrate Tablet (ISORDIL) 30 milliGRAM(s) Oral daily  levothyroxine 100 MICROGram(s) Oral daily  multivitamin 1 Tablet(s) Oral daily  pantoprazole    Tablet 40 milliGRAM(s) Oral before breakfast      Objective:    10-10 @ 07:01  -  10-11 @ 07:00  --------------------------------------------------------  IN: 420 mL / OUT: 0 mL / NET: 420 mL      T(C): 36.7 (10-11-17 @ 17:01), Max: 36.7 (10-11-17 @ 17:01)  HR: 66 (10-11-17 @ 17:01) (54 - 87)  BP: 111/58 (10-11-17 @ 17:01) (108/59 - 159/66)  RR: 18 (10-11-17 @ 17:01) (16 - 18)  SpO2: 94% (10-11-17 @ 17:01) (93% - 98%)  Wt(kg): --      Physical Exam:  General: No acute distress  Eyes: sclera anicteric, pupils equal and reactive to light  ENMT: buccal mucosa moist, pharynx not injected  Neck: supple, trachea midline  Lungs: Decreased no wheeze/rhonchi  Cardiovascular: regular rate and rhythm, S1 S2  Abdomen: soft, nontender, no organomegaly present, bowel sounds normal  Neurological: alert and oriented x3, Cranial Nerves II-XII grossly intact  Skin: no increased ecchymosis/petechiae/purpura  Lymph Nodes: no palpable cervical/supraclavicular lymph nodes enlargements  Extremities: no cyanosis/clubbing +trace edema      LABS:                          11.3   11.6  )-----------( 94       ( 11 Oct 2017 07:46 )             36.7       10-11    140  |  105  |  47<H>  ----------------------------<  88  4.1   |  27  |  1.53<H>    Ca    9.0      11 Oct 2017 07:46         Assessment :   89 yr old with PMH of Dementia, severe AS, admitted with SOB   COPD/CHF exacerbation  Asymptomatic bacteruria with ESBL EColi  No clinical evidence for UTI  CHAVO improving  Clinically stable    Plan :   No indication for antibiotic  Stable from ID standpoint  Will sign off as no active ID issues       Continue with present regiment.  Appropriate use of antibiotics and adverse effects reviewed.      > 25 minutes were spent in direct patient care reviewing notes, medications ,labs data/ imaging , discussion with multidisciplinary team.    Thank you for allowing me to participate in care of your patient .    Darien Boateng MD  Infectious Disease  [phone]

## 2017-10-11 NOTE — PROGRESS NOTE ADULT - ASSESSMENT
89 year old female, admitted for episodes of SOB.  Echo demonstrated normal EF, normal PA pressure and moderate Aortic insufficiency.  Now stable.

## 2017-10-11 NOTE — PROGRESS NOTE ADULT - SUBJECTIVE AND OBJECTIVE BOX
Patient is a 89y old  Female who presents with a chief complaint of Dysnea (01 Oct 2017 13:50)      Patient seen in follow up for CHAVO. Resting in bed. Comfortable.     PAST MEDICAL HISTORY:  CLL (chronic lymphocytic leukemia)  Dementia with behavioral disturbance, unspecified dementia type  Gastrointestinal hemorrhage, unspecified gastrointestinal hemorrhage type  Nontraumatic intracerebral hemorrhage, unspecified cerebral location, unspecified laterality  Rhinitis  Anxiety  GERD (gastroesophageal reflux disease)  Hypothyroidism  Coronary artery disease  COPD (chronic obstructive pulmonary disease)  Parkinson's disease    MEDICATIONS  (STANDING):  buPROPion XL . 150 milliGRAM(s) Oral daily  carbidopa/levodopa  25/100 1 Tablet(s) Oral daily  carvedilol 3.125 milliGRAM(s) Oral every 12 hours  fluticasone propionate 50 MICROgram(s)/spray Nasal Spray 1 Spray(s) Both Nostrils daily  hydrALAZINE 50 milliGRAM(s) Oral every 8 hours  isosorbide   dinitrate Tablet (ISORDIL) 30 milliGRAM(s) Oral daily  levothyroxine 100 MICROGram(s) Oral daily  multivitamin 1 Tablet(s) Oral daily  pantoprazole    Tablet 40 milliGRAM(s) Oral before breakfast    MEDICATIONS  (PRN):  acetaminophen   Tablet 650 milliGRAM(s) Oral every 6 hours PRN moderate pain 4-6  ALBUTerol    0.083% 2.5 milliGRAM(s) Nebulizer every 6 hours PRN Shortness of Breath and/or Wheezing    T(C): 36.4 (10-11-17 @ 05:37), Max: 37.3 (10-10-17 @ 17:05)  HR: 58 (10-11-17 @ 05:37) (56 - 87)  BP: 159/66 (10-11-17 @ 05:37) (98/53 - 159/66)  RR: 18 (10-11-17 @ 05:37)  SpO2: 95% (10-11-17 @ 05:37)  Wt(kg): --  I&O's Detail    10 Oct 2017 07:01  -  11 Oct 2017 07:00  --------------------------------------------------------  IN:    Oral Fluid: 420 mL  Total IN: 420 mL    OUT:  Total OUT: 0 mL    Total NET: 420 mL              PHYSICAL EXAM:  General: NAD  Respiratory: b/l air entry  Cardiovascular: S1 S2  Gastrointestinal: soft  Extremities:  no edema                LABORATORY:                        11.3   11.6  )-----------( 94       ( 11 Oct 2017 07:46 )             36.7     10-11    140  |  105  |  47<H>  ----------------------------<  88  4.1   |  27  |  1.53<H>    Ca    9.0      11 Oct 2017 07:46      Sodium, Serum: 140 mmol/L (10-11 @ 07:46)  Sodium, Serum: 141 mmol/L (10-10 @ 07:13)    Potassium, Serum: 4.1 mmol/L (10-11 @ 07:46)  Potassium, Serum: 4.4 mmol/L (10-10 @ 07:13)    Hemoglobin: 11.3 g/dL (10-11 @ 07:46)  Hemoglobin: 11.3 g/dL (10-10 @ 07:13)  Hemoglobin: 12.0 g/dL (10-09 @ 07:52)    Creatinine, Serum 1.53 (10-11 @ 07:46)  Creatinine, Serum 1.73 (10-10 @ 07:13)  Creatinine, Serum 1.44 (10-09 @ 07:52)            ABG - ( 09 Oct 2017 15:13 )  pH: x     /  pCO2: 25    /  pO2: 59    / HCO3: 24    / Base Excess: -1.0  /  SaO2: 92

## 2017-10-12 VITALS
OXYGEN SATURATION: 93 % | HEART RATE: 68 BPM | TEMPERATURE: 97 F | RESPIRATION RATE: 16 BRPM | SYSTOLIC BLOOD PRESSURE: 120 MMHG | DIASTOLIC BLOOD PRESSURE: 63 MMHG

## 2017-10-12 LAB
ANION GAP SERPL CALC-SCNC: 9 MMOL/L — SIGNIFICANT CHANGE UP (ref 5–17)
BUN SERPL-MCNC: 49 MG/DL — HIGH (ref 7–23)
CALCIUM SERPL-MCNC: 8.9 MG/DL — SIGNIFICANT CHANGE UP (ref 8.4–10.5)
CHLORIDE SERPL-SCNC: 106 MMOL/L — SIGNIFICANT CHANGE UP (ref 96–108)
CO2 SERPL-SCNC: 26 MMOL/L — SIGNIFICANT CHANGE UP (ref 22–31)
CREAT SERPL-MCNC: 1.59 MG/DL — HIGH (ref 0.5–1.3)
GLUCOSE SERPL-MCNC: 85 MG/DL — SIGNIFICANT CHANGE UP (ref 70–99)
HCT VFR BLD CALC: 34.3 % — LOW (ref 34.5–45)
HGB BLD-MCNC: 11.3 G/DL — LOW (ref 11.5–15.5)
MCHC RBC-ENTMCNC: 30.3 PG — SIGNIFICANT CHANGE UP (ref 27–34)
MCHC RBC-ENTMCNC: 32.9 GM/DL — SIGNIFICANT CHANGE UP (ref 32–36)
MCV RBC AUTO: 92 FL — SIGNIFICANT CHANGE UP (ref 80–100)
PLATELET # BLD AUTO: 97 K/UL — LOW (ref 150–400)
POTASSIUM SERPL-MCNC: 4 MMOL/L — SIGNIFICANT CHANGE UP (ref 3.5–5.3)
POTASSIUM SERPL-SCNC: 4 MMOL/L — SIGNIFICANT CHANGE UP (ref 3.5–5.3)
RBC # BLD: 3.73 M/UL — LOW (ref 3.8–5.2)
RBC # FLD: 13.8 % — SIGNIFICANT CHANGE UP (ref 10.3–14.5)
SODIUM SERPL-SCNC: 141 MMOL/L — SIGNIFICANT CHANGE UP (ref 135–145)
WBC # BLD: 10.5 K/UL — SIGNIFICANT CHANGE UP (ref 3.8–10.5)
WBC # FLD AUTO: 10.5 K/UL — SIGNIFICANT CHANGE UP (ref 3.8–10.5)

## 2017-10-12 RX ADMIN — Medication 650 MILLIGRAM(S): at 15:07

## 2017-10-12 RX ADMIN — CARBIDOPA AND LEVODOPA 1 TABLET(S): 25; 100 TABLET ORAL at 11:20

## 2017-10-12 RX ADMIN — PANTOPRAZOLE SODIUM 40 MILLIGRAM(S): 20 TABLET, DELAYED RELEASE ORAL at 06:22

## 2017-10-12 RX ADMIN — ISOSORBIDE DINITRATE 30 MILLIGRAM(S): 5 TABLET ORAL at 11:20

## 2017-10-12 RX ADMIN — Medication 1 TABLET(S): at 11:20

## 2017-10-12 RX ADMIN — Medication 50 MILLIGRAM(S): at 06:22

## 2017-10-12 RX ADMIN — Medication 100 MICROGRAM(S): at 06:22

## 2017-10-12 RX ADMIN — BUPROPION HYDROCHLORIDE 150 MILLIGRAM(S): 150 TABLET, EXTENDED RELEASE ORAL at 11:19

## 2017-10-12 RX ADMIN — CARVEDILOL PHOSPHATE 3.12 MILLIGRAM(S): 80 CAPSULE, EXTENDED RELEASE ORAL at 06:22

## 2017-10-12 RX ADMIN — Medication 1 SPRAY(S): at 11:20

## 2017-10-12 NOTE — PROGRESS NOTE ADULT - NSHPATTENDINGPLANDISCUSS_GEN_ALL_CORE
Dr Jeanie taylor
Dr. Barton in detail
Dr Jeanie taylor

## 2017-10-12 NOTE — PROGRESS NOTE ADULT - RESPIRATORY
detailed exam

## 2017-10-12 NOTE — PROGRESS NOTE ADULT - MENTAL STATUS
poor memory
confused
poor memory
poor memory
confused but alert
poor memory
confused
confused
poor memory, more alert now

## 2017-10-12 NOTE — PROGRESS NOTE ADULT - PROBLEM SELECTOR PROBLEM 1
COPD (chronic obstructive pulmonary disease)
CKD (chronic kidney disease)
COPD (chronic obstructive pulmonary disease)
Coronary artery disease involving native heart without angina pectoris, unspecified vessel or lesion type
Dementia with behavioral disturbance, unspecified dementia type
ESBL (extended spectrum beta-lactamase) producing bacteria infection
Gastroesophageal reflux disease without esophagitis
Pleural effusion
Aortic insufficiency
Congestive heart failure, unspecified congestive heart failure chronicity, unspecified congestive heart failure type
Pleural effusion
CLL (chronic lymphocytic leukemia)
Congestive heart failure, unspecified congestive heart failure chronicity, unspecified congestive heart failure type

## 2017-10-12 NOTE — PROGRESS NOTE ADULT - NS ABD PE RECTAL EXAM
patient refused

## 2017-10-12 NOTE — PROGRESS NOTE ADULT - PROBLEM SELECTOR PROBLEM 3
Dementia with behavioral disturbance, unspecified dementia type
Aortic stenosis
CKD (chronic kidney disease)
Chronic obstructive pulmonary disease, unspecified COPD type
Chronic obstructive pulmonary disease, unspecified COPD type
Congestive heart failure, unspecified congestive heart failure chronicity, unspecified congestive heart failure type
Dementia with behavioral disturbance, unspecified dementia type
Hypothyroidism
Pleural effusion
Chronic obstructive pulmonary disease, unspecified COPD type

## 2017-10-12 NOTE — PROGRESS NOTE ADULT - PROBLEM SELECTOR PROBLEM 2
Aortic stenosis
CLL (chronic lymphocytic leukemia)
Aortic insufficiency
Aortic stenosis
Aortic stenosis
COPD (chronic obstructive pulmonary disease)
COPD (chronic obstructive pulmonary disease)
Coronary artery disease
Coronary artery disease involving native heart without angina pectoris, unspecified vessel or lesion type
Dementia with behavioral disturbance, unspecified dementia type
Aortic insufficiency
Aortic stenosis
COPD (chronic obstructive pulmonary disease)
Chronic obstructive pulmonary disease, unspecified COPD type
Dementia with behavioral disturbance, unspecified dementia type

## 2017-10-12 NOTE — PROGRESS NOTE ADULT - ASSESSMENT
89 yr old with PMH of Dementia,depression,severe aortic incoprtence, chf, CVA- haemorruagic stroke in 2005,severe GI bleed 4 yrs back,pleural effusion, gait disbility following CVA,possible parkinsonafter CVa,hypothyroid, lactose intolerant,allergic to penicillin and contrast dye,recently treated with levofloxacin for possible lung infection, sent from assisted living for SOB, as per son pt does have intermittent episodes of SOB, and is usually better if stays upright, and possible also chokes on food, loosing weight , takes puree diet , no fever, no loss of appetite, ambulates with walker , no bowel complaint,    history obtained from SON who is a podiatry  Pt has living will is full code at  present  admitted with SOB has elevated pro BNP, with b/l pleural effusion, most likely acute exacerbation of CHF diastolic with aortic insufficiency with copd acute on chronic ,h/o CVA- ICH , h/o GI bleed frailitity, ambulatory disabilty ,  dysphagia on mechanical soft with nectar thick fluids,, arf, has ESBL  bacteruria asymtomatic  unlikely UTI, received invanz one dose had ID consult and as asymptomatic likely asymptomatic bacteruria with ESBL e coli, abx stopped,  echo b/l atrial enlargement with severe aortic incompetence, ARF with diuresis likely, nephro f up, hydartion to continue, trend bmp, trending down,CKD 3, has asymptomatic bacteruria e coli  .GERD stable,  had short episode of unresponsiveness on 10/9 and was had respiratory  alkalosis as per blood gas with copd . improved in short time spontaneously. lasix stopped, likely vasovagal syncope  TSH is high , levothyroxin dose increased.CKD3 stable, nephro follow up noted avoid nephrotoxic meds. stable for discharge  PT   DC to assisted living

## 2017-10-12 NOTE — PROGRESS NOTE ADULT - PROBLEM SELECTOR PROBLEM 8
Acute renal failure, unspecified acute renal failure type

## 2017-10-12 NOTE — PROGRESS NOTE ADULT - PROBLEM SELECTOR PLAN 5
ppi
ppi
advanced dementia  supportive care  fall prec  out of bed with assist  ADL support
ppi

## 2017-10-12 NOTE — PROGRESS NOTE ADULT - PROBLEM SELECTOR PROBLEM 7
Pleural effusion

## 2017-10-12 NOTE — PROGRESS NOTE ADULT - PROBLEM SELECTOR PLAN 8
likely pre renal due to diuresis, had nephro consult, lasix stopped, gentle po hydration.trend renal function out pt
likely pre renal due to diuresis, had nephro consult, lasix dose decreased, gentle po hydration.trend renal function

## 2017-10-12 NOTE — PROGRESS NOTE ADULT - PROBLEM SELECTOR PLAN 2
monitor BP  BP control  cvs regimen  monitor for pulm edema
CLL  established dx
Albuterol PRN  monitor sat  keep sat > 88 pct  HOB elevation  seasonal vaccination
Await Echo
BP control  cvs regimen  dietary discretion
Echo without severe AS, moderately severe AI
Out patient follow up.
copd rx regimen  nebs   o2 support  keep sat > 88 pct  chest pt as needed  seasonal vaccination
cvs regimen  BP control  I and O  cr monitoring  am labs pending
supportive care  assist with ADL  fall prec  out of bed with ASSIST
supportive care  oral and skin care  fall prec  dc planning  HOB elevation  asp prec
supportive care  pt is DNR  fall prec  oral and skin care  oral hygiene  assist with ADL
As per Pulmonary
Await Echo
Echo without severe AS, moderately severe AI
Echo without severe AS, moderately severe AI
Inhalers
No further work up needed.  No Aortic Stenosis noted on Echo.
supportive care

## 2017-10-12 NOTE — PROGRESS NOTE ADULT - NEGATIVE CARDIOVASCULAR SYMPTOMS
no chest pain/no palpitations
no chest pain/no palpitations
no palpitations/no chest pain
no palpitations/no chest pain
no chest pain/no palpitations
no palpitations/no chest pain

## 2017-10-12 NOTE — PROGRESS NOTE ADULT - PROBLEM SELECTOR PLAN 4
levothyroxin
levothyroxin
dc Inhalers  pt has dementia  unable to get full benefit from Inhaler use  albuterol NEBS prn  monitor sat  keep sat > 88 pct  keep HOB elevated
asp prec  oral and skin care  HOB elevation   aspiration risk high
PPI  HOB elevation
albuterol NEBS prn  keep sat > 88 pct  HOB elevation  seasonal vaccination  prognosis guarded to poor
supportive care  fall prec  pt is DNR  assist with ADL  oral and skin care  dc planning
levothyroxin

## 2017-10-12 NOTE — PROGRESS NOTE ADULT - SUBJECTIVE AND OBJECTIVE BOX
Patient is a 89y Female with a known history of :  Coronary artery disease involving native heart without angina pectoris, unspecified vessel or lesion type (I25.10)  Acute renal failure, unspecified acute renal failure type (N17.9)  CKD (chronic kidney disease) (N18.9)  Aortic insufficiency (I35.1)  ESBL (extended spectrum beta-lactamase) producing bacteria infection (A49.9)  COPD (chronic obstructive pulmonary disease) (J44.9)  CLL (chronic lymphocytic leukemia) (C91.10)  Aortic stenosis (I35.0)  Coronary artery disease (I25.10)  Hypothyroidism (E03.9)  Pleural effusion (J90)  Dysphagia, unspecified type (R13.10)  Gastroesophageal reflux disease without esophagitis (K21.9)  Hypothyroidism, unspecified type (E03.9)  Chronic obstructive pulmonary disease, unspecified COPD type (J44.9)  Dementia with behavioral disturbance, unspecified dementia type (F03.91)  Congestive heart failure, unspecified congestive heart failure chronicity, unspecified congestive heart failure type (I50.9)    HPI:  89 yr old with PMH of Dementia,depression,severe aortic stenosis, chf, CVA- haemorruagic stroke in 2005,severe GI bleed 4 yrs back,pleural effusion, gait disbility following CVA,possible parkinsonafter CVa,hypothyroid, lactose intolerant,allergic to penicillin and contrast dye,recently treated with levofloxacin for possible lung infection, sent from assisted living for SOB, as per son pt does have intermittent episodes of SOB, and is usually better if stays upright, and possible also chokes on food, loosing weight , takes puree diet , no fever, no loss of appetite, ambulates with walker , no bowel complaint,    history obtained from SON who is a podiatry  Pt has living will is full code at  present (01 Oct 2017 10:07)      REVIEW OF SYSTEMS:    CONSTITUTIONAL: No fever, weight loss, or fatigue  EYES: No eye pain, visual disturbances, or discharge  ENMT:  No difficulty hearing, tinnitus, vertigo; No sinus or throat pain  NECK: No pain or stiffness  BREASTS: No pain, masses, or nipple discharge  RESPIRATORY: No cough, wheezing, chills or hemoptysis; No shortness of breath  CARDIOVASCULAR: No chest pain, palpitations, dizziness, or leg swelling  GASTROINTESTINAL: No abdominal or epigastric pain. No nausea, vomiting, or hematemesis; No diarrhea or constipation. No melena or hematochezia.  GENITOURINARY: No dysuria, frequency, hematuria, or incontinence  NEUROLOGICAL: No headaches, memory loss, loss of strength, numbness, or tremors  SKIN: No itching, burning, rashes, or lesions   LYMPH NODES: No enlarged glands  ENDOCRINE: No heat or cold intolerance; No hair loss  MUSCULOSKELETAL: No joint pain or swelling; No muscle, back, or extremity pain  PSYCHIATRIC: No depression, anxiety, mood swings, or difficulty sleeping  HEME/LYMPH: No easy bruising, or bleeding gums  ALLERGY AND IMMUNOLOGIC: No hives or eczema    MEDICATIONS  (STANDING):  buPROPion XL . 150 milliGRAM(s) Oral daily  carbidopa/levodopa  25/100 1 Tablet(s) Oral daily  carvedilol 3.125 milliGRAM(s) Oral every 12 hours  fluticasone propionate 50 MICROgram(s)/spray Nasal Spray 1 Spray(s) Both Nostrils daily  hydrALAZINE 50 milliGRAM(s) Oral every 8 hours  isosorbide   dinitrate Tablet (ISORDIL) 30 milliGRAM(s) Oral daily  levothyroxine 100 MICROGram(s) Oral daily  multivitamin 1 Tablet(s) Oral daily  pantoprazole    Tablet 40 milliGRAM(s) Oral before breakfast    MEDICATIONS  (PRN):  acetaminophen   Tablet 650 milliGRAM(s) Oral every 6 hours PRN moderate pain 4-6  ALBUTerol    0.083% 2.5 milliGRAM(s) Nebulizer every 6 hours PRN Shortness of Breath and/or Wheezing      ALLERGIES: IV Contrast (Unknown)  penicillin (Unknown)      FAMILY HISTORY:      Social history:  Alochol:   Smoking:   Drug Use:   Marital Status:     PHYSICAL EXAMINATION:  -----------------------------  T(C): 36.5 (10-12-17 @ 05:05), Max: 36.9 (10-11-17 @ 21:13)  HR: 58 (10-12-17 @ 05:05) (54 - 80)  BP: 158/72 (10-12-17 @ 05:05) (110/60 - 162/74)  RR: 18 (10-12-17 @ 05:05) (16 - 18)  SpO2: 95% (10-12-17 @ 05:05) (93% - 98%)  Wt(kg): --        Constitutional: well developed, normal appearance, well groomed, well nourished, no deformities and no acute distress.   Eyes: the conjunctiva exhibited no abnormalities and the eyelids demonstrated no xanthelasmas.   HEENT: normal oral mucosa, no oral pallor and no oral cyanosis.   Neck: normal jugular venous A waves present, normal jugular venous V waves present and no jugular venous leong A waves.   Pulmonary: no respiratory distress, normal respiratory rhythm and effort, no accessory muscle use and lungs were clear to auscultation bilaterally.   Cardiovascular: heart rate and rhythm were normal, normal S1 and S2 and no murmur, gallop, rub, heave or thrill are present.   Abdomen: soft, non-tender, no hepato-splenomegaly and no abdominal mass palpated.   Musculoskeletal: the gait could not be assessed..   Extremities: no clubbing of the fingernails, no localized cyanosis, no petechial hemorrhages and no ischemic changes.   Skin: normal skin color and pigmentation, no rash, no venous stasis, no skin lesions, no skin ulcer and no xanthoma was observed.   Psychiatric: oriented to person, place, and time, the affect was normal, the mood was normal and not feeling anxious.     LABS:   --------  10-12    141  |  106  |  49<H>  ----------------------------<  85  4.0   |  26  |  1.59<H>    Ca    8.9      12 Oct 2017 08:03                           11.3   10.5  )-----------( 97       ( 12 Oct 2017 08:03 )             34.3                   Radiology: Patient is a 89y Female with a known history of :  Coronary artery disease involving native heart without angina pectoris, unspecified vessel or lesion type (I25.10)  Acute renal failure, unspecified acute renal failure type (N17.9)  CKD (chronic kidney disease) (N18.9)  Aortic insufficiency (I35.1)  ESBL (extended spectrum beta-lactamase) producing bacteria infection (A49.9)  COPD (chronic obstructive pulmonary disease) (J44.9)  CLL (chronic lymphocytic leukemia) (C91.10)  Aortic stenosis (I35.0)  Coronary artery disease (I25.10)  Hypothyroidism (E03.9)  Pleural effusion (J90)  Dysphagia, unspecified type (R13.10)  Gastroesophageal reflux disease without esophagitis (K21.9)  Hypothyroidism, unspecified type (E03.9)  Chronic obstructive pulmonary disease, unspecified COPD type (J44.9)  Dementia with behavioral disturbance, unspecified dementia type (F03.91)  Congestive heart failure, unspecified congestive heart failure chronicity, unspecified congestive heart failure type (I50.9)    HPI:  89 yr old with PMH of Dementia,depression,severe aortic stenosis, chf, CVA- haemorruagic stroke in 2005,severe GI bleed 4 yrs back,pleural effusion, gait disbility following CVA,possible parkinsonafter CVa,hypothyroid, lactose intolerant,allergic to penicillin and contrast dye,recently treated with levofloxacin for possible lung infection, sent from assisted living for SOB, as per son pt does have intermittent episodes of SOB, and is usually better if stays upright, and possible also chokes on food, loosing weight , takes puree diet , no fever, no loss of appetite, ambulates with walker , no bowel complaint,    history obtained from SON who is a podiatry  Pt has living will is full code at  present (01 Oct 2017 10:07)      REVIEW OF SYSTEMS:    CONSTITUTIONAL: No fever, weight loss, or fatigue  EYES: No eye pain, visual disturbances, or discharge  ENMT:  No difficulty hearing, tinnitus, vertigo; No sinus or throat pain  NECK: No pain or stiffness  BREASTS: No pain, masses, or nipple discharge  RESPIRATORY: No cough, wheezing, chills or hemoptysis; No shortness of breath  CARDIOVASCULAR: No chest pain, palpitations, dizziness, or leg swelling  GASTROINTESTINAL: No abdominal or epigastric pain. No nausea, vomiting, or hematemesis; No diarrhea or constipation. No melena or hematochezia.  GENITOURINARY: No dysuria, frequency, hematuria, or incontinence  NEUROLOGICAL: No headaches, memory loss, loss of strength, numbness, or tremors  SKIN: No itching, burning, rashes, or lesions   LYMPH NODES: No enlarged glands  ENDOCRINE: No heat or cold intolerance; No hair loss  MUSCULOSKELETAL: No joint pain or swelling; No muscle, back, or extremity pain  PSYCHIATRIC: No depression, anxiety, mood swings, or difficulty sleeping  HEME/LYMPH: No easy bruising, or bleeding gums  ALLERGY AND IMMUNOLOGIC: No hives or eczema    MEDICATIONS  (STANDING):  buPROPion XL . 150 milliGRAM(s) Oral daily  carbidopa/levodopa  25/100 1 Tablet(s) Oral daily  carvedilol 3.125 milliGRAM(s) Oral every 12 hours  fluticasone propionate 50 MICROgram(s)/spray Nasal Spray 1 Spray(s) Both Nostrils daily  hydrALAZINE 50 milliGRAM(s) Oral every 8 hours  isosorbide   dinitrate Tablet (ISORDIL) 30 milliGRAM(s) Oral daily  levothyroxine 100 MICROGram(s) Oral daily  multivitamin 1 Tablet(s) Oral daily  pantoprazole    Tablet 40 milliGRAM(s) Oral before breakfast    MEDICATIONS  (PRN):  acetaminophen   Tablet 650 milliGRAM(s) Oral every 6 hours PRN moderate pain 4-6  ALBUTerol    0.083% 2.5 milliGRAM(s) Nebulizer every 6 hours PRN Shortness of Breath and/or Wheezing      ALLERGIES: IV Contrast (Unknown)  penicillin (Unknown)      FAMILY HISTORY:      Social history:  Alochol:   Smoking:   Drug Use:   Marital Status:     PHYSICAL EXAMINATION:  -----------------------------  T(C): 36.5 (10-12-17 @ 05:05), Max: 36.9 (10-11-17 @ 21:13)  HR: 58 (10-12-17 @ 05:05) (54 - 80)  BP: 158/72 (10-12-17 @ 05:05) (110/60 - 162/74)  RR: 18 (10-12-17 @ 05:05) (16 - 18)  SpO2: 95% (10-12-17 @ 05:05) (93% - 98%)  Wt(kg): --        Constitutional: well developed, normal appearance, well groomed, well nourished, no deformities and no acute distress.   Eyes: the conjunctiva exhibited no abnormalities and the eyelids demonstrated no xanthelasmas.   HEENT: normal oral mucosa, no oral pallor and no oral cyanosis.   Neck: normal jugular venous A waves present, normal jugular venous V waves present and no jugular venous leong A waves.   Pulmonary: no respiratory distress, normal respiratory rhythm and effort, no accessory muscle use and lungs were clear to auscultation bilaterally.   Cardiovascular: heart rate and rhythm were normal, normal S1 and S2 and no murmur, gallop, rub, heave or thrill are present.   Musculoskeletal: the gait could not be assessed.  Extremities: no clubbing of the fingernails, no localized cyanosis, no petechial hemorrhages and no ischemic changes.   Skin: normal skin color and pigmentation, no rash, no venous stasis, no skin lesions, no skin ulcer and no xanthoma was observed.     LABS:   --------  10-12    141  |  106  |  49<H>  ----------------------------<  85  4.0   |  26  |  1.59<H>    Ca    8.9      12 Oct 2017 08:03                           11.3   10.5  )-----------( 97       ( 12 Oct 2017 08:03 )             34.3                   Radiology:

## 2017-10-12 NOTE — PROGRESS NOTE ADULT - NEGATIVE GASTROINTESTINAL SYMPTOMS
no vomiting/no nausea
no nausea/no vomiting
no vomiting/no nausea

## 2017-10-12 NOTE — PROGRESS NOTE ADULT - PROBLEM SELECTOR PROBLEM 6
Dysphagia, unspecified type
Dysphagia, unspecified type
Dementia with behavioral disturbance, unspecified dementia type
ESBL (extended spectrum beta-lactamase) producing bacteria infection
Dysphagia, unspecified type

## 2017-10-12 NOTE — PROGRESS NOTE ADULT - CONSTITUTIONAL DETAILS
no distress
respiratory distress
no distress

## 2017-10-12 NOTE — PROGRESS NOTE ADULT - PROBLEM SELECTOR PROBLEM 4
Hypothyroidism, unspecified type
COPD (chronic obstructive pulmonary disease)
Dysphagia, unspecified type
COPD (chronic obstructive pulmonary disease)
Dementia with behavioral disturbance, unspecified dementia type
Gastroesophageal reflux disease without esophagitis
Hypothyroidism
Hypothyroidism, unspecified type

## 2017-10-12 NOTE — PROGRESS NOTE ADULT - ASSESSMENT
89 year old female, history dementia, CLL, ?aortic stenosis, pleural effuisons, s/p CVA. Brought to ER for shortness of breath. Chronic? bilateral effusions  noted. Originally given IV Lasix with relief.  Now feeling better.     Echocardiogram compatible with moderate/severe AI.    Mild tricuspid insufficiency and moderate MR.    10/12/17   Condition essentially unchanged.  Patient awake and alert.  Eating breakfast.  No complaints offered.  No arrhythmias noted.    Plan:  - Continue present therapy.  - Ambulate with assistance if possible.  - Being followed by Pulmonary and Renal.  - D/C planning.

## 2017-10-12 NOTE — PROGRESS NOTE ADULT - GUM GEN PE MLT EXAM PC
detailed exam
detailed exam
patient refused
detailed exam
detailed exam
patient refused
detailed exam
patient refused
detailed exam

## 2017-10-12 NOTE — PROGRESS NOTE ADULT - ATTENDING COMMENTS
I have discussed care plan with patient and HCP,expressed understanding of problems treatment and their effect and side effects, alternatives in detail,I have asked if they have any questions and concerns and appropriately addressed them to best of my ability  Reviewed all diagonostic tests, lab results and drug drug interactions, and medications  45 minutes spent on this visit, 50% visit time spent in care co-ordination with other attendings and counselling patient

## 2017-10-12 NOTE — PROGRESS NOTE ADULT - NEGATIVE RESPIRATORY AND THORAX SYMPTOMS
no dyspnea/no cough
no cough/no dyspnea
no cough/no dyspnea
no dyspnea/no cough
no cough/no dyspnea
no dyspnea/no cough
no dyspnea/no cough
no cough
no dyspnea/no cough
no cough/no dyspnea

## 2017-10-12 NOTE — PROGRESS NOTE ADULT - SUBJECTIVE AND OBJECTIVE BOX
Date/Time Patient Seen:  		  Referring MD:   Data Reviewed	       Patient is a 89y old  Female who presents with a chief complaint of Dysnea (09 Oct 2017 11:24)  in bed  seen and examined  vs and meds reviewed        Subjective/HPI     PAST MEDICAL & SURGICAL HISTORY:  CLL (chronic lymphocytic leukemia)  Dementia with behavioral disturbance, unspecified dementia type  Gastrointestinal hemorrhage, unspecified gastrointestinal hemorrhage type  Nontraumatic intracerebral hemorrhage, unspecified cerebral location, unspecified laterality  Rhinitis  Anxiety  GERD (gastroesophageal reflux disease)  Hypothyroidism  Coronary artery disease  COPD (chronic obstructive pulmonary disease)  Parkinson's disease  Closed fracture of neck of left femur, initial encounter        Medication list         MEDICATIONS  (STANDING):  buPROPion XL . 150 milliGRAM(s) Oral daily  carbidopa/levodopa  25/100 1 Tablet(s) Oral daily  carvedilol 3.125 milliGRAM(s) Oral every 12 hours  fluticasone propionate 50 MICROgram(s)/spray Nasal Spray 1 Spray(s) Both Nostrils daily  hydrALAZINE 50 milliGRAM(s) Oral every 8 hours  isosorbide   dinitrate Tablet (ISORDIL) 30 milliGRAM(s) Oral daily  levothyroxine 100 MICROGram(s) Oral daily  multivitamin 1 Tablet(s) Oral daily  pantoprazole    Tablet 40 milliGRAM(s) Oral before breakfast    MEDICATIONS  (PRN):  acetaminophen   Tablet 650 milliGRAM(s) Oral every 6 hours PRN moderate pain 4-6  ALBUTerol    0.083% 2.5 milliGRAM(s) Nebulizer every 6 hours PRN Shortness of Breath and/or Wheezing         Vitals log        ICU Vital Signs Last 24 Hrs  T(C): 36.5 (12 Oct 2017 05:05), Max: 36.9 (11 Oct 2017 21:13)  T(F): 97.7 (12 Oct 2017 05:05), Max: 98.4 (11 Oct 2017 21:13)  HR: 58 (12 Oct 2017 05:05) (54 - 80)  BP: 158/72 (12 Oct 2017 05:05) (110/60 - 162/74)  BP(mean): --  ABP: --  ABP(mean): --  RR: 18 (12 Oct 2017 05:05) (16 - 18)  SpO2: 95% (12 Oct 2017 05:05) (93% - 98%)           Input and Output:  I&O's Detail      Lab Data                        11.3   11.6  )-----------( 94       ( 11 Oct 2017 07:46 )             36.7     10-11    140  |  105  |  47<H>  ----------------------------<  88  4.1   |  27  |  1.53<H>    Ca    9.0      11 Oct 2017 07:46              Review of Systems	      Objective     Physical Examination    head at  heart - s1s2  lungs - dec BS  abd - soft      Pertinent Lab findings & Imaging      Desire:  NO   Adequate UO     I&O's Detail           Discussed with:     Cultures:	        Radiology

## 2017-10-12 NOTE — PROGRESS NOTE ADULT - PROBLEM SELECTOR PROBLEM 5
Gastroesophageal reflux disease without esophagitis
Gastroesophageal reflux disease without esophagitis
Dementia with behavioral disturbance, unspecified dementia type
Coronary artery disease
Gastroesophageal reflux disease without esophagitis

## 2017-10-12 NOTE — PROGRESS NOTE ADULT - PROVIDER SPECIALTY LIST ADULT
Cardiology
Critical Care
Infectious Disease
Internal Medicine
Nephrology
Pulmonology
Internal Medicine
Cardiology
Cardiology
Pulmonology
Internal Medicine
Pulmonology
Internal Medicine

## 2017-10-12 NOTE — PROGRESS NOTE ADULT - PROBLEM SELECTOR PLAN 6
aspiration precautions
aspiration precautions
on levaquin  urine cx noted  will give Invanz x 1 now  ID eval pending  ESBL cx
speech swallow aspiration precautions
aspiration precautions
speech swallow aspiration precautions
aspiration precautions

## 2017-10-12 NOTE — PROGRESS NOTE ADULT - BREASTS
No masses; no nipple discharge

## 2017-10-12 NOTE — PROGRESS NOTE ADULT - PROBLEM SELECTOR PLAN 7
monitor, pulmonary f up
monitor, pulmonary consult
monitor, pulmonary f up
monitor, pulmonary consult
monitor, pulmonary f up

## 2017-10-12 NOTE — PROGRESS NOTE ADULT - GASTROINTESTINAL DETAILS
soft/nontender/bowel sounds normal
nontender/bowel sounds normal/soft
soft/nontender/bowel sounds normal
bowel sounds normal/nontender/soft
bowel sounds normal/soft/nontender
bowel sounds normal/nontender/soft
nontender/bowel sounds normal/soft
soft/bowel sounds normal/nontender
bowel sounds normal/soft/nontender
nontender/bowel sounds normal/soft
soft/nontender/bowel sounds normal

## 2017-10-12 NOTE — PROGRESS NOTE ADULT - RS GEN PE MLT RESP DETAILS PC
airway patent/diminished breath sounds, L/diminished breath sounds, R
diminished breath sounds, L/diminished breath sounds, R
airway patent/diminished breath sounds, R/diminished breath sounds, L
diminished breath sounds, L/diminished breath sounds, R
airway patent/clear to auscultation bilaterally/respirations non-labored
diminished breath sounds, L/diminished breath sounds, R/airway patent
diminished breath sounds, R/diminished breath sounds, L/airway patent
diminished breath sounds, R/diminished breath sounds, L/airway patent
diminished breath sounds, L/diminished breath sounds, R/airway patent
airway patent/diminished breath sounds, L/diminished breath sounds, R
clear to auscultation bilaterally/airway patent

## 2017-10-12 NOTE — PROGRESS NOTE ADULT - GENITOURINARY FEMALE
normal external genitalia

## 2017-10-12 NOTE — PROGRESS NOTE ADULT - CARDIOVASCULAR DETAILS
murmur/positive S2/positive S1
positive S2/positive S1
positive S1/positive S2
positive S2/positive S1
positive S1/positive S2
positive S2/positive S1
positive S1/positive S2
positive S2/positive S1
positive S1/positive S2

## 2017-10-12 NOTE — PROGRESS NOTE ADULT - SUBJECTIVE AND OBJECTIVE BOX
Patient is a 89y old  Female who presents with a chief complaint of Dysnea (09 Oct 2017 11:24)      INTERVAL HPI/OVERNIGHT EVENTS:no new complaint    MEDICATIONS  (STANDING):  buPROPion XL . 150 milliGRAM(s) Oral daily  carbidopa/levodopa  25/100 1 Tablet(s) Oral daily  carvedilol 3.125 milliGRAM(s) Oral every 12 hours  fluticasone propionate 50 MICROgram(s)/spray Nasal Spray 1 Spray(s) Both Nostrils daily  hydrALAZINE 50 milliGRAM(s) Oral every 8 hours  isosorbide   dinitrate Tablet (ISORDIL) 30 milliGRAM(s) Oral daily  levothyroxine 100 MICROGram(s) Oral daily  multivitamin 1 Tablet(s) Oral daily  pantoprazole    Tablet 40 milliGRAM(s) Oral before breakfast    MEDICATIONS  (PRN):  acetaminophen   Tablet 650 milliGRAM(s) Oral every 6 hours PRN moderate pain 4-6  ALBUTerol    0.083% 2.5 milliGRAM(s) Nebulizer every 6 hours PRN Shortness of Breath and/or Wheezing      Allergies    IV Contrast (Unknown)  penicillin (Unknown)    Intolerances          Vital Signs Last 24 Hrs  T(C): 36.3 (12 Oct 2017 09:37), Max: 36.9 (11 Oct 2017 21:13)  T(F): 97.3 (12 Oct 2017 09:37), Max: 98.4 (11 Oct 2017 21:13)  HR: 68 (12 Oct 2017 09:37) (56 - 80)  BP: 120/63 (12 Oct 2017 09:37) (110/60 - 162/74)  BP(mean): --  RR: 16 (12 Oct 2017 09:37) (16 - 18)  SpO2: 93% (12 Oct 2017 09:37) (93% - 98%)    LABS:                        11.3   10.5  )-----------( 97       ( 12 Oct 2017 08:03 )             34.3     10-12    141  |  106  |  49<H>  ----------------------------<  85  4.0   |  26  |  1.59<H>    Ca    8.9      12 Oct 2017 08:03          CAPILLARY BLOOD GLUCOSE    Culture - Urine (10.01.17 @ 19:22)    -  Amikacin: S <=8    -  Ampicillin: R >16    -  Ampicillin/Sulbactam: R 8/4    -  Aztreonam: R 16    -  Cefazolin: R >16    -  Cefepime: R >16    -  Cefoxitin: S 8    -  Ceftazidime: R 8    -  Ceftriaxone: R >32    -  Ciprofloxacin: R >2    -  Ertapenem: S <=0.5    -  Gentamicin: S <=1    -  Imipenem: S <=1    -  Levofloxacin: R >4    -  Meropenem: S <=1    -  Nitrofurantoin: S <=32    -  Piperacillin/Tazobactam: R <=8    -  Tobramycin: S <=2    -  Trimethoprim/Sulfamethoxazole: R >2/38    Specimen Source: .Urine Catheterized    Culture Results:   10,000 - 49,000 CFU/mL Escherichia coli ESBL    Organism Identification: Escherichia coli ESBL    Organism: Escherichia coli ESBL    Method Type: ZAINA      < from: Xray Chest 1 View AP- PORTABLE-Urgent (10.09.17 @ 15:21) >  radiograph of the chest is performed. comparison is made to   10/2/2017.    The cardiomediastinal silhouette is normal. There are bilateral pleural   effusions decreased on the left. There is diffuse osteopenia of the bony   structures.    Impression: Decrease in left pleural effusion. Stable right pleural   effusion.      < end of copied text >    < from: US Transthoracic Echocardiogram w/Doppler Complete (10.02.17 @ 09:05) >   root3.0 cm,LA 5.9cm, LVEDD 4.5cm,2.6cm, septal wall   thickness 1.3 cm, posterior wall thickness 1.3 cm   AV velocity 1,7   m/sec, MV velocity 1.1 m /sec  EF 65%    Mitral Valve: mild mitral annular calcification .thickened mitral valve   with moderate regurgitation ,  Aortic Valve:Thickened aortic valve with moderate to  severe   regurgitation   Left Atrium:  severely enlarged  Left Ventricle: Normal size , moderate left ventricular hypertrophy with   normal EF   ,  Pericardium: Trace pericardial effusion   Tricuspid Valve: Appears normal with mild regurgitation with RVSp 31  mm   hg   Pulmonic Valve: appears normal with mild regurgitation   Right Ventricle: Normal size with normal systolic function   Right Atrium: enlarged    Large pleural effusion noted     SUMMARY:  1. Left ventricular hypertrophy with normal EF  2. Biatrial enlargement   3. aortic sclerosis moderate to  severe AI   4. mild Tricuspid regurgitation with RVSP 31 mm hg .  5. Moderate mitral regurgitation.        < end of copied text >        10-12 @ 07:01  -  10-12 @ 11:12  --------------------------------------------------------  IN: 420 mL / OUT: 0 mL / NET: 420 mL          RADIOLOGY & ADDITIONAL TESTS:    Imaging Personally Reviewed:  [ ] YES  [ ] NO    Consultant(s) Notes Reviewed:  [ ] YES  [ ] NO    Care Discussed with Consultants/Other Providers [ ] YES  [ ] NO

## 2017-10-12 NOTE — PROGRESS NOTE ADULT - NS NEC GEN PE MLT EXAM PC
No bruits; no thyromegaly or nodules
detailed exam
No bruits; no thyromegaly or nodules

## 2017-10-12 NOTE — PROGRESS NOTE ADULT - PROBLEM SELECTOR PLAN 3
cont duoneb,pulm consult
supportive care  fall prec  oral and skin care  assist with ADL
on lasix  I and O  monitor BP and cr and lytes
AI  MR  valvular heart disease  BP control  cvs regimen  renal adjustment for meds
BP control  valv heart disease  cvs regimen  I and O  cardio following  prognosis guarded  pt is DNR
CVS regimen  diuresis on HOLD  monitor BP, control BP  dietary discretion  cardio follow up  prognosis poor  pt is DNR
Follow Internal Med for Synthroid dose needed.
Follow Internal Med for Synthroid dose needed.
albuterol PRN  monitor sat  keep sat > 88 pct  chest pt  seasonal vaccination
am labs pending  renal and cardio following  I and O  am Cr pending  ckd lloyd, in light of CHF and HTN  prognosis poor  pt is from [facility]  pt is DNR
nebs prn  monitor sat  keep sat > 88 pct  seasonal vaccination  prognosis guarded
supportive care  pt is DNR  assist with ADL  fall prec  oral hygiene  skin hygiene  asp prec  dc planning
Diuresis
Follow Internal Med for Synthroid dose needed.
cont duoneb,pulm consult
cont duoneb,

## 2017-10-12 NOTE — PROGRESS NOTE ADULT - NEGATIVE GENERAL SYMPTOMS
no chills/no fever
no fever/no chills
no chills/no fever
no fever/no chills

## 2017-10-12 NOTE — PROGRESS NOTE ADULT - MS EXT PE MLT D E PC
no pedal edema
no pedal edema/no cyanosis
no pedal edema

## 2017-10-12 NOTE — PROGRESS NOTE ADULT - SUBJECTIVE AND OBJECTIVE BOX
Patient is a 89y old  Female who presents with a chief complaint of Dysnea (01 Oct 2017 13:50)      Patient seen in follow up for CHAVO. Comfortable    PAST MEDICAL HISTORY:  CLL (chronic lymphocytic leukemia)  Dementia with behavioral disturbance, unspecified dementia type  Gastrointestinal hemorrhage, unspecified gastrointestinal hemorrhage type  Nontraumatic intracerebral hemorrhage, unspecified cerebral location, unspecified laterality  Rhinitis  Anxiety  GERD (gastroesophageal reflux disease)  Hypothyroidism  Coronary artery disease  COPD (chronic obstructive pulmonary disease)  Parkinson's disease    MEDICATIONS  (STANDING):  buPROPion XL . 150 milliGRAM(s) Oral daily  carbidopa/levodopa  25/100 1 Tablet(s) Oral daily  carvedilol 3.125 milliGRAM(s) Oral every 12 hours  fluticasone propionate 50 MICROgram(s)/spray Nasal Spray 1 Spray(s) Both Nostrils daily  hydrALAZINE 50 milliGRAM(s) Oral every 8 hours  isosorbide   dinitrate Tablet (ISORDIL) 30 milliGRAM(s) Oral daily  levothyroxine 100 MICROGram(s) Oral daily  multivitamin 1 Tablet(s) Oral daily  pantoprazole    Tablet 40 milliGRAM(s) Oral before breakfast    MEDICATIONS  (PRN):  acetaminophen   Tablet 650 milliGRAM(s) Oral every 6 hours PRN moderate pain 4-6  ALBUTerol    0.083% 2.5 milliGRAM(s) Nebulizer every 6 hours PRN Shortness of Breath and/or Wheezing    T(C): 36.3 (10-12-17 @ 09:37), Max: 37.3 (10-10-17 @ 17:05)  HR: 68 (10-12-17 @ 09:37) (54 - 87)  BP: 120/63 (10-12-17 @ 09:37) (98/53 - 162/74)  RR: 16 (10-12-17 @ 09:37)  SpO2: 93% (10-12-17 @ 09:37)  Wt(kg): --  I&O's Detail    12 Oct 2017 07:01  -  12 Oct 2017 13:30  --------------------------------------------------------  IN:    Oral Fluid: 420 mL  Total IN: 420 mL    OUT:  Total OUT: 0 mL    Total NET: 420 mL                PHYSICAL EXAM:  General: NAD  Respiratory: b/l air entry  Cardiovascular: S1 S2  Gastrointestinal: soft  Extremities:  no edema                LABORATORY:                        11.3   10.5  )-----------( 97       ( 12 Oct 2017 08:03 )             34.3     10-12    141  |  106  |  49<H>  ----------------------------<  85  4.0   |  26  |  1.59<H>    Ca    8.9      12 Oct 2017 08:03      Sodium, Serum: 141 mmol/L (10-12 @ 08:03)  Sodium, Serum: 140 mmol/L (10-11 @ 07:46)    Potassium, Serum: 4.0 mmol/L (10-12 @ 08:03)  Potassium, Serum: 4.1 mmol/L (10-11 @ 07:46)    Hemoglobin: 11.3 g/dL (10-12 @ 08:03)  Hemoglobin: 11.3 g/dL (10-11 @ 07:46)  Hemoglobin: 11.3 g/dL (10-10 @ 07:13)    Creatinine, Serum 1.59 (10-12 @ 08:03)  Creatinine, Serum 1.53 (10-11 @ 07:46)  Creatinine, Serum 1.73 (10-10 @ 07:13)

## 2017-10-12 NOTE — PROGRESS NOTE ADULT - BACK
detailed exam
No deformity or limitation of movement
detailed exam

## 2017-10-12 NOTE — PROGRESS NOTE ADULT - CVS HE PE MLT D E PC
regular rate and rhythm

## 2017-11-19 ENCOUNTER — INPATIENT (INPATIENT)
Facility: HOSPITAL | Age: 82
LOS: 2 days | Discharge: NURSING, REHAB OR LONG-TERM CARE | DRG: 291 | End: 2017-11-22
Attending: INTERNAL MEDICINE | Admitting: INTERNAL MEDICINE
Payer: MEDICARE

## 2017-11-19 VITALS
HEIGHT: 64 IN | RESPIRATION RATE: 24 BRPM | SYSTOLIC BLOOD PRESSURE: 181 MMHG | TEMPERATURE: 99 F | HEART RATE: 69 BPM | OXYGEN SATURATION: 94 % | DIASTOLIC BLOOD PRESSURE: 82 MMHG | WEIGHT: 95.02 LBS

## 2017-11-19 DIAGNOSIS — I50.43 ACUTE ON CHRONIC COMBINED SYSTOLIC (CONGESTIVE) AND DIASTOLIC (CONGESTIVE) HEART FAILURE: ICD-10-CM

## 2017-11-19 DIAGNOSIS — D72.829 ELEVATED WHITE BLOOD CELL COUNT, UNSPECIFIED: ICD-10-CM

## 2017-11-19 DIAGNOSIS — S72.002A FRACTURE OF UNSPECIFIED PART OF NECK OF LEFT FEMUR, INITIAL ENCOUNTER FOR CLOSED FRACTURE: Chronic | ICD-10-CM

## 2017-11-19 DIAGNOSIS — D64.89 OTHER SPECIFIED ANEMIAS: ICD-10-CM

## 2017-11-19 DIAGNOSIS — F03.91 UNSPECIFIED DEMENTIA WITH BEHAVIORAL DISTURBANCE: ICD-10-CM

## 2017-11-19 DIAGNOSIS — N18.3 CHRONIC KIDNEY DISEASE, STAGE 3 (MODERATE): ICD-10-CM

## 2017-11-19 DIAGNOSIS — R13.10 DYSPHAGIA, UNSPECIFIED: ICD-10-CM

## 2017-11-19 DIAGNOSIS — C91.10 CHRONIC LYMPHOCYTIC LEUKEMIA OF B-CELL TYPE NOT HAVING ACHIEVED REMISSION: ICD-10-CM

## 2017-11-19 DIAGNOSIS — J44.9 CHRONIC OBSTRUCTIVE PULMONARY DISEASE, UNSPECIFIED: ICD-10-CM

## 2017-11-19 DIAGNOSIS — K21.9 GASTRO-ESOPHAGEAL REFLUX DISEASE WITHOUT ESOPHAGITIS: ICD-10-CM

## 2017-11-19 PROBLEM — I25.10 ATHEROSCLEROTIC HEART DISEASE OF NATIVE CORONARY ARTERY WITHOUT ANGINA PECTORIS: Chronic | Status: ACTIVE | Noted: 2017-10-01

## 2017-11-19 PROBLEM — J31.0 CHRONIC RHINITIS: Chronic | Status: ACTIVE | Noted: 2017-10-01

## 2017-11-19 PROBLEM — F41.9 ANXIETY DISORDER, UNSPECIFIED: Chronic | Status: ACTIVE | Noted: 2017-10-01

## 2017-11-19 PROBLEM — E03.9 HYPOTHYROIDISM, UNSPECIFIED: Chronic | Status: ACTIVE | Noted: 2017-10-01

## 2017-11-19 LAB
ALBUMIN SERPL ELPH-MCNC: 3.5 G/DL — SIGNIFICANT CHANGE UP (ref 3.3–5)
ALP SERPL-CCNC: 151 U/L — HIGH (ref 30–120)
ALT FLD-CCNC: 29 U/L DA — SIGNIFICANT CHANGE UP (ref 10–60)
ANION GAP SERPL CALC-SCNC: 10 MMOL/L — SIGNIFICANT CHANGE UP (ref 5–17)
APPEARANCE UR: ABNORMAL
AST SERPL-CCNC: 27 U/L — SIGNIFICANT CHANGE UP (ref 10–40)
BASOPHILS # BLD AUTO: 0.1 K/UL — SIGNIFICANT CHANGE UP (ref 0–0.2)
BASOPHILS NFR BLD AUTO: 0.9 % — SIGNIFICANT CHANGE UP (ref 0–2)
BILIRUB SERPL-MCNC: 1.3 MG/DL — HIGH (ref 0.2–1.2)
BILIRUB UR-MCNC: NEGATIVE — SIGNIFICANT CHANGE UP
BUN SERPL-MCNC: 36 MG/DL — HIGH (ref 7–23)
CALCIUM SERPL-MCNC: 8.6 MG/DL — SIGNIFICANT CHANGE UP (ref 8.4–10.5)
CHLORIDE SERPL-SCNC: 108 MMOL/L — SIGNIFICANT CHANGE UP (ref 96–108)
CK MB BLD-MCNC: 4.5 % — HIGH (ref 0–3.5)
CK MB CFR SERPL CALC: 2.9 NG/ML — SIGNIFICANT CHANGE UP (ref 0–3.6)
CO2 SERPL-SCNC: 23 MMOL/L — SIGNIFICANT CHANGE UP (ref 22–31)
COLOR SPEC: SIGNIFICANT CHANGE UP
CREAT SERPL-MCNC: 1.33 MG/DL — HIGH (ref 0.5–1.3)
CRP SERPL-MCNC: 0.3 MG/DL — SIGNIFICANT CHANGE UP (ref 0–0.4)
DIFF PNL FLD: ABNORMAL
EOSINOPHIL # BLD AUTO: 0 K/UL — SIGNIFICANT CHANGE UP (ref 0–0.5)
EOSINOPHIL NFR BLD AUTO: 0.4 % — SIGNIFICANT CHANGE UP (ref 0–6)
GLUCOSE SERPL-MCNC: 91 MG/DL — SIGNIFICANT CHANGE UP (ref 70–99)
GLUCOSE UR QL: NEGATIVE MG/DL — SIGNIFICANT CHANGE UP
HCT VFR BLD CALC: 36 % — SIGNIFICANT CHANGE UP (ref 34.5–45)
HGB BLD-MCNC: 11.7 G/DL — SIGNIFICANT CHANGE UP (ref 11.5–15.5)
KETONES UR-MCNC: NEGATIVE — SIGNIFICANT CHANGE UP
LACTATE SERPL-SCNC: 1.5 MMOL/L — SIGNIFICANT CHANGE UP (ref 0.7–2)
LEUKOCYTE ESTERASE UR-ACNC: ABNORMAL
LYMPHOCYTES # BLD AUTO: 47.9 % — HIGH (ref 13–44)
LYMPHOCYTES # BLD AUTO: 6.4 K/UL — HIGH (ref 1–3.3)
MAGNESIUM SERPL-MCNC: 2.4 MG/DL — SIGNIFICANT CHANGE UP (ref 1.6–2.6)
MCHC RBC-ENTMCNC: 29 PG — SIGNIFICANT CHANGE UP (ref 27–34)
MCHC RBC-ENTMCNC: 32.4 GM/DL — SIGNIFICANT CHANGE UP (ref 32–36)
MCV RBC AUTO: 89.4 FL — SIGNIFICANT CHANGE UP (ref 80–100)
MONOCYTES # BLD AUTO: 0.3 K/UL — SIGNIFICANT CHANGE UP (ref 0–0.9)
MONOCYTES NFR BLD AUTO: 2.6 % — SIGNIFICANT CHANGE UP (ref 2–14)
NEUTROPHILS # BLD AUTO: 6.4 K/UL — SIGNIFICANT CHANGE UP (ref 1.8–7.4)
NEUTROPHILS NFR BLD AUTO: 48.2 % — SIGNIFICANT CHANGE UP (ref 43–77)
NITRITE UR-MCNC: NEGATIVE — SIGNIFICANT CHANGE UP
NT-PROBNP SERPL-SCNC: HIGH PG/ML (ref 0–450)
PH UR: 7 — SIGNIFICANT CHANGE UP (ref 5–8)
PLATELET # BLD AUTO: 135 K/UL — LOW (ref 150–400)
POTASSIUM SERPL-MCNC: 4.9 MMOL/L — SIGNIFICANT CHANGE UP (ref 3.5–5.3)
POTASSIUM SERPL-SCNC: 4.9 MMOL/L — SIGNIFICANT CHANGE UP (ref 3.5–5.3)
PROCALCITONIN SERPL-MCNC: 0.05 NG/ML — HIGH (ref 0–0.04)
PROT SERPL-MCNC: 6.1 G/DL — SIGNIFICANT CHANGE UP (ref 6–8.3)
PROT UR-MCNC: 15 MG/DL
RBC # BLD: 4.03 M/UL — SIGNIFICANT CHANGE UP (ref 3.8–5.2)
RBC # FLD: 14.8 % — HIGH (ref 10.3–14.5)
SODIUM SERPL-SCNC: 141 MMOL/L — SIGNIFICANT CHANGE UP (ref 135–145)
SP GR SPEC: 1 — LOW (ref 1.01–1.02)
TROPONIN I SERPL-MCNC: 0.05 NG/ML — SIGNIFICANT CHANGE UP (ref 0.02–0.06)
UROBILINOGEN FLD QL: NEGATIVE MG/DL — SIGNIFICANT CHANGE UP
WBC # BLD: 13.3 K/UL — HIGH (ref 3.8–10.5)
WBC # FLD AUTO: 13.3 K/UL — HIGH (ref 3.8–10.5)

## 2017-11-19 PROCEDURE — 99285 EMERGENCY DEPT VISIT HI MDM: CPT

## 2017-11-19 PROCEDURE — 93010 ELECTROCARDIOGRAM REPORT: CPT

## 2017-11-19 PROCEDURE — 71250 CT THORAX DX C-: CPT | Mod: 26

## 2017-11-19 PROCEDURE — 71010: CPT | Mod: 26

## 2017-11-19 PROCEDURE — 93971 EXTREMITY STUDY: CPT | Mod: 26,RT

## 2017-11-19 RX ORDER — FUROSEMIDE 40 MG
80 TABLET ORAL ONCE
Qty: 0 | Refills: 0 | Status: COMPLETED | OUTPATIENT
Start: 2017-11-19 | End: 2017-11-19

## 2017-11-19 RX ORDER — ACETAMINOPHEN 500 MG
650 TABLET ORAL EVERY 6 HOURS
Qty: 0 | Refills: 0 | Status: DISCONTINUED | OUTPATIENT
Start: 2017-11-19 | End: 2017-11-22

## 2017-11-19 RX ORDER — FUROSEMIDE 40 MG
20 TABLET ORAL
Qty: 0 | Refills: 0 | Status: DISCONTINUED | OUTPATIENT
Start: 2017-11-19 | End: 2017-11-21

## 2017-11-19 RX ORDER — BUPROPION HYDROCHLORIDE 150 MG/1
150 TABLET, EXTENDED RELEASE ORAL DAILY
Qty: 0 | Refills: 0 | Status: DISCONTINUED | OUTPATIENT
Start: 2017-11-19 | End: 2017-11-22

## 2017-11-19 RX ORDER — PANTOPRAZOLE SODIUM 20 MG/1
40 TABLET, DELAYED RELEASE ORAL
Qty: 0 | Refills: 0 | Status: DISCONTINUED | OUTPATIENT
Start: 2017-11-19 | End: 2017-11-22

## 2017-11-19 RX ORDER — CARBIDOPA AND LEVODOPA 25; 100 MG/1; MG/1
1 TABLET ORAL DAILY
Qty: 0 | Refills: 0 | Status: DISCONTINUED | OUTPATIENT
Start: 2017-11-19 | End: 2017-11-22

## 2017-11-19 RX ORDER — NITROGLYCERIN 6.5 MG
0.4 CAPSULE, EXTENDED RELEASE ORAL ONCE
Qty: 0 | Refills: 0 | Status: COMPLETED | OUTPATIENT
Start: 2017-11-19 | End: 2017-11-19

## 2017-11-19 RX ORDER — ALBUTEROL 90 UG/1
2.5 AEROSOL, METERED ORAL EVERY 6 HOURS
Qty: 0 | Refills: 0 | Status: DISCONTINUED | OUTPATIENT
Start: 2017-11-19 | End: 2017-11-22

## 2017-11-19 RX ORDER — CARVEDILOL PHOSPHATE 80 MG/1
3.12 CAPSULE, EXTENDED RELEASE ORAL EVERY 12 HOURS
Qty: 0 | Refills: 0 | Status: DISCONTINUED | OUTPATIENT
Start: 2017-11-19 | End: 2017-11-22

## 2017-11-19 RX ORDER — LEVOTHYROXINE SODIUM 125 MCG
100 TABLET ORAL DAILY
Qty: 0 | Refills: 0 | Status: DISCONTINUED | OUTPATIENT
Start: 2017-11-19 | End: 2017-11-22

## 2017-11-19 RX ORDER — ISOSORBIDE DINITRATE 5 MG/1
10 TABLET ORAL THREE TIMES A DAY
Qty: 0 | Refills: 0 | Status: DISCONTINUED | OUTPATIENT
Start: 2017-11-19 | End: 2017-11-22

## 2017-11-19 RX ORDER — HEPARIN SODIUM 5000 [USP'U]/ML
5000 INJECTION INTRAVENOUS; SUBCUTANEOUS EVERY 12 HOURS
Qty: 0 | Refills: 0 | Status: DISCONTINUED | OUTPATIENT
Start: 2017-11-19 | End: 2017-11-19

## 2017-11-19 RX ORDER — SODIUM CHLORIDE 9 MG/ML
1000 INJECTION INTRAMUSCULAR; INTRAVENOUS; SUBCUTANEOUS ONCE
Qty: 0 | Refills: 0 | Status: COMPLETED | OUTPATIENT
Start: 2017-11-19 | End: 2017-11-19

## 2017-11-19 RX ORDER — HYDRALAZINE HCL 50 MG
50 TABLET ORAL EVERY 8 HOURS
Qty: 0 | Refills: 0 | Status: DISCONTINUED | OUTPATIENT
Start: 2017-11-19 | End: 2017-11-22

## 2017-11-19 RX ADMIN — Medication 20 MILLIGRAM(S): at 17:55

## 2017-11-19 RX ADMIN — CARVEDILOL PHOSPHATE 3.12 MILLIGRAM(S): 80 CAPSULE, EXTENDED RELEASE ORAL at 18:04

## 2017-11-19 RX ADMIN — Medication 50 MILLIGRAM(S): at 14:02

## 2017-11-19 RX ADMIN — CARBIDOPA AND LEVODOPA 1 TABLET(S): 25; 100 TABLET ORAL at 14:01

## 2017-11-19 RX ADMIN — ISOSORBIDE DINITRATE 10 MILLIGRAM(S): 5 TABLET ORAL at 14:02

## 2017-11-19 RX ADMIN — ISOSORBIDE DINITRATE 10 MILLIGRAM(S): 5 TABLET ORAL at 21:54

## 2017-11-19 RX ADMIN — Medication 0.4 MILLIGRAM(S): at 05:34

## 2017-11-19 RX ADMIN — Medication 80 MILLIGRAM(S): at 06:13

## 2017-11-19 RX ADMIN — BUPROPION HYDROCHLORIDE 150 MILLIGRAM(S): 150 TABLET, EXTENDED RELEASE ORAL at 14:01

## 2017-11-19 RX ADMIN — Medication 50 MILLIGRAM(S): at 21:54

## 2017-11-19 RX ADMIN — Medication 1 TABLET(S): at 14:02

## 2017-11-19 RX ADMIN — SODIUM CHLORIDE 1000 MILLILITER(S): 9 INJECTION INTRAMUSCULAR; INTRAVENOUS; SUBCUTANEOUS at 07:03

## 2017-11-19 NOTE — ED PROVIDER NOTE - MEDICAL DECISION MAKING DETAILS
89 y.o. F with SOB - h/o CHF and COPD - increased pleural effusions on xray, high proBNP, initially started fluid for concern of dehydration,

## 2017-11-19 NOTE — CONSULT NOTE ADULT - PROBLEM SELECTOR RECOMMENDATION 3
CHF  valv heart disease  diastolic HF  known to Cardio service  s/p LASIX, feels better, will dose LASIX at 20 mg IV BID for now, I and O, tele admit  pt is DNR  replete lytes  prognosis guarded  monitor Cr  check ct chest to eval for pleural eff size and needs for thoracentesis

## 2017-11-19 NOTE — CONSULT NOTE ADULT - SUBJECTIVE AND OBJECTIVE BOX
Date/Time Patient Seen:  		  Referring MD:   Data Reviewed	       Patient is a 89y old  Female who presents with a chief complaint of sob    Subjective/HPI  seen and examined  vs and meds reviewed  s/p Lasix therapy in ED  pt has dementia, poor historian  pt was in [facility] recently, seen by renal, ID, Cardio and Pulmonary on last admission  at present she's on oxygen, frail and weak    89 y.o. F BIBEMS from [facility], pt was reported to have been SOB around 2am, was given nebulizer treatment, on EMS arrival pt appeared comfortable and in no respiratory distress, EMS was told to transport for evaluation. pt not sure why she is here. does think she remembers breathing treatment tonight. does think she's had a cough recently. denies fever. HPI limited by dementia.     PAST MEDICAL & SURGICAL HISTORY:  CLL (chronic lymphocytic leukemia)  Dementia with behavioral disturbance, unspecified dementia type  Gastrointestinal hemorrhage, unspecified gastrointestinal hemorrhage type  Nontraumatic intracerebral hemorrhage, unspecified cerebral location, unspecified laterality  Rhinitis  Anxiety  GERD (gastroesophageal reflux disease)  Hypothyroidism  Coronary artery disease  COPD (chronic obstructive pulmonary disease)  Parkinson's disease  Closed fracture of neck of left femur, initial encounter        Medication list         MEDICATIONS  (STANDING):  carbidopa/levodopa  25/100 1 Tablet(s) Oral daily  furosemide   Injectable 20 milliGRAM(s) IV Push two times a day  heparin  Injectable 5000 Unit(s) SubCutaneous every 12 hours  levothyroxine 100 MICROGram(s) Oral daily  multivitamin 1 Tablet(s) Oral daily  pantoprazole    Tablet 40 milliGRAM(s) Oral before breakfast    MEDICATIONS  (PRN):  acetaminophen   Tablet 650 milliGRAM(s) Oral every 6 hours PRN For Temp greater than 38 C (100.4 F)  acetaminophen   Tablet. 650 milliGRAM(s) Oral every 6 hours PRN Mild Pain (1 - 3)         Vitals log        ICU Vital Signs Last 24 Hrs  T(C): 36.8 (19 Nov 2017 07:55), Max: 37.2 (19 Nov 2017 04:24)  T(F): 98.2 (19 Nov 2017 07:55), Max: 98.9 (19 Nov 2017 04:24)  HR: 69 (19 Nov 2017 07:55) (65 - 70)  BP: 177/86 (19 Nov 2017 07:55) (165/78 - 181/82)  BP(mean): 96 (19 Nov 2017 06:14) (90 - 96)  ABP: --  ABP(mean): --  RR: 28 (19 Nov 2017 07:55) (24 - 28)  SpO2: 100% (19 Nov 2017 06:50) (94% - 100%)           Input and Output:  I&O's Detail      Lab Data                        11.7   13.3  )-----------( 135      ( 19 Nov 2017 05:06 )             36.0     11-19    141  |  108  |  36<H>  ----------------------------<  91  4.9   |  23  |  1.33<H>    Ca    8.6      19 Nov 2017 05:06    TPro  6.1  /  Alb  3.5  /  TBili  1.3<H>  /  DBili  x   /  AST  27  /  ALT  29  /  AlkPhos  151<H>  11-19      CARDIAC MARKERS ( 19 Nov 2017 05:16 )  .049 ng/mL / x     / 64 U/L / x     / 2.9 ng/mL        Review of Systems	  sob  confused      Objective     Physical Examination    frail  weak  head at  heart - s1s2  lungs - dec BS  abd - soft      Pertinent Lab findings & Imaging      Phipps:  NO   Adequate UO     I&O's Detail           Discussed with:     Cultures:	        Radiology  bilateral effusions, atelectasis,

## 2017-11-19 NOTE — CONSULT NOTE ADULT - PROBLEM SELECTOR PROBLEM 3
89F w/ R proximal humerus fx  Analgesia  NWB RUE  RICE RUE  ROM Fingers  Keep RUE in sling  Recommend stat PT eval  F/U in office with Dr. Phipps 1 week after discharge. Call office for appointment.  d/w attending and agrees with above
Acute on chronic combined systolic and diastolic congestive heart failure

## 2017-11-19 NOTE — CONSULT NOTE ADULT - PROBLEM SELECTOR RECOMMENDATION 9
albuterol PRN  keep sat > 88 pct  chest pt  seasonal vaccination
not clear if has dx.  Can check oupt on followup for more clarity

## 2017-11-19 NOTE — CONSULT NOTE ADULT - SUBJECTIVE AND OBJECTIVE BOX
Patient is a 89y old  Female who presents with a chief complaint of SOB (2017 09:29)        HPI:  89 yr old with PMH of Dementia,depression,severe aortic stenosis, chf, CVA- haemorruagic stroke in 2005,severe GI bleed 4 yrs back,pleural effusion, gait disbility following CVA,possible parkinsonafter CVa,hypothyroid, lactose intolerant,allergic to penicillin and contrast dye,recently treated for similar complaint with chf copd renal failure , sent back for SOB, IN ED found to have b/l pleural effusion worsened , and is being admitted for further care, has  leucocytosis, no fever , no chills.no nauasea no vomiting, on pureed diet (2017 09:29)    Heme asked to consult on pt for high WBC.   Son at bedside. Per son Hiren, (Podiatrist), states mother was dx wtih "CLL" when she last lived in [place]. ~, pt suffered CVA, spontaneous subdural hemorrhage, and had PICA aneurysm, required clipping, all while living in [place]. Subsequently brought back to [place], and since in care facilities, now at [facility]. Pt also with hx fo CHF, aortic insufficiency, HTN. Sent to hospital due to SOB.     PAST MEDICAL & SURGICAL HISTORY:  CLL (chronic lymphocytic leukemia)  Dementia with behavioral disturbance, unspecified dementia type  Gastrointestinal hemorrhage, unspecified gastrointestinal hemorrhage type  Nontraumatic intracerebral hemorrhage, unspecified cerebral location, unspecified laterality  Rhinitis  Anxiety  GERD (gastroesophageal reflux disease)  Hypothyroidism  Coronary artery disease  COPD (chronic obstructive pulmonary disease)  Closed fracture of neck of left femur, initial encounter        HEALTH ISSUES - PROBLEM Dx:  Leukocytosis, unspecified type: Leukocytosis, unspecified type  Dysphagia, unspecified type: Dysphagia, unspecified type  Gastroesophageal reflux disease without esophagitis: Gastroesophageal reflux disease without esophagitis  CKD (chronic kidney disease) stage 3, GFR 30-59 ml/min: CKD (chronic kidney disease) stage 3, GFR 30-59 ml/min  Chronic obstructive pulmonary disease, unspecified COPD type: Chronic obstructive pulmonary disease, unspecified COPD type  Dementia with behavioral disturbance, unspecified dementia type: Dementia with behavioral disturbance, unspecified dementia type  Acute on chronic combined systolic and diastolic congestive heart failure: Acute on chronic combined systolic and diastolic congestive heart failure  CLL (chronic lymphocytic leukemia): CLL (chronic lymphocytic leukemia)  COPD (chronic obstructive pulmonary disease): COPD (chronic obstructive pulmonary disease)            FAMILY HISTORY:  father  ~44yo from acute leukemia  mother  ~91yo from old age.   twin sister had CVA and anerysm, alive.   1 son, 1 dtr.     [SOCIAL HISTORY: ]  smoking: ex-smoker, in youth  EtOH: no EtOH  illicit drugs: no illicit drugs  occupation: clerical work  marital status:  since         [ALLERGIES/INTOLERANCES:]  Allergies      IV Contrast (Unknown)      penicillin (Unknown)  Intolerances          MEDICATIONS  (STANDING):  buPROPion XL . 150 milliGRAM(s) Oral daily  carbidopa/levodopa  25/100 1 Tablet(s) Oral daily  carvedilol 3.125 milliGRAM(s) Oral every 12 hours  furosemide   Injectable 20 milliGRAM(s) IV Push two times a day  hydrALAZINE 50 milliGRAM(s) Oral every 8 hours  isosorbide   dinitrate Tablet (ISORDIL) 10 milliGRAM(s) Oral three times a day  levothyroxine 100 MICROGram(s) Oral daily  multivitamin 1 Tablet(s) Oral daily  pantoprazole    Tablet 40 milliGRAM(s) Oral before breakfast    MEDICATIONS  (PRN):  acetaminophen   Tablet 650 milliGRAM(s) Oral every 6 hours PRN For Temp greater than 38 C (100.4 F)  acetaminophen   Tablet. 650 milliGRAM(s) Oral every 6 hours PRN Mild Pain (1 - 3)  ALBUTerol    0.083% 2.5 milliGRAM(s) Nebulizer every 6 hours PRN Shortness of Breath and/or Wheezing        [REVIEW OF SYSTEMS: ]  CONSTITUTIONAL: normal   EYES: No eye pain, no visual disturbances, + discharge  ENMT:  no discharge  NECK: No pain, no stiffness  BREASTS: No pain, no masses, no nipple discharge  RESPIRATORY: No cough, no wheezing, no chills, no hemoptysis; + shortness of breath  CARDIOVASCULAR: No chest pain, no palpitations, no dizziness, no leg swelling  GASTROINTESTINAL: No abdominal or epigastric pain. No nausea, no vomiting, no hematemesis; No diarrhea.  No melena, no hematochezia.  GENITOURINARY: No dysuria, no frequency, no hematuria, no incontinence  NEUROLOGICAL: No headaches, + memory loss, no loss of strength, no numbness, no tremors  SKIN: No itching, no burning, no rashes, no lesions   LYMPH NODES: No enlarged glands  ENDOCRINE: No heat or cold intolerance; No hair loss  MUSCULOSKELETAL: No joint pain or swelling; No muscle, no back, or extremity pain  PSYCHIATRIC: No depression, no anxiety, no mood swings, no difficulty sleeping  HEME/LYMPH: No easy bruising, no bleeding gums        Vital Signs Last 24 Hrs  T(C): 36.6 (2017 17:43), Max: 37.2 (2017 04:24)  T(F): 97.8 (2017 17:43), Max: 98.9 (2017 04:24)  HR: 65 (2017 17:43) (65 - 71)  BP: 127/65 (2017 17:43) (127/65 - 181/82)  BP(mean): 96 (2017 06:14) (90 - 96)  RR: 20 (2017 17:43) (20 - 28)  SpO2: 92% (2017 17:43) (92% - 100%)      [PHYSICAL EXAM]  General: elderly frail F, WN, WD, NAD  HEENT: clear oropharynx, slight discharge to eyes, no erythema  Neck: supple, no thyroid mass  CV: normal S1S2 RRR, no murmur, no rubs, no gallops  Lungs: clear to auscultation BL, no wheezes, no rales, no rhonchi  Abdomen: soft non-tender non-distended, no hepatomegaly, no splenomegaly  Ext: no clubbing, no cyanosis, no edema  Skin: no rashes, no petechiae  Neuro: alert and oriented X1, no focal weakness.   LN: no axillar LN, no SC LN, no ing LN.       [LABS:]                        11.7   13.3  )-----------( 135      ( 2017 05:06 )             36.0     CBC Full  -  ( 2017 05:06 )  WBC Count : 13.3 K/uL  Hemoglobin : 11.7 g/dL  Hematocrit : 36.0 %  Platelet Count - Automated : 135 K/uL  Mean Cell Volume : 89.4 fl  Mean Cell Hemoglobin : 29.0 pg  Mean Cell Hemoglobin Concentration : 32.4 gm/dL  Auto Neutrophil # : 6.4 K/uL  Auto Lymphocyte # : 6.4 K/uL  Auto Monocyte # : 0.3 K/uL  Auto Eosinophil # : 0.0 K/uL  Auto Basophil # : 0.1 K/uL  Auto Neutrophil % : 48.2 %  Auto Lymphocyte % : 47.9 %  Auto Monocyte % : 02.6 %  Auto Eosinophil % : 0.4 %  Auto Basophil % : 0.9 %      141  |  108  |  36<H>  ----------------------------<  91  4.9   |  23  |  1.33<H>  Ca    8.6      2017 05:06  Mg     2.4       TPro  6.1  /  Alb  3.5  /  TBili  1.3<H>  /  DBili  x   /  AST  27  /  ALT  29  /  AlkPhos  151<H>        LIVER FUNCTIONS - ( 2017 05:06 )  Alb: 3.5 g/dL / Pro: 6.1 g/dL / ALK PHOS: 151 U/L / ALT: 29 U/L DA / AST: 27 U/L / GGT: x           CARDIAC MARKERS ( 2017 05:16 )  .049 ng/mL / x     / 64 U/L / x     / 2.9 ng/mL      Urinalysis Basic - ( 2017 06:53 )    Color: Pale Yellow / Appearance: Slightly Turbid / S.005 / pH: x  Gluc: x / Ketone: Negative  / Bili: Negative / Urobili: Negative mg/dL   Blood: x / Protein: 15 mg/dL / Nitrite: Negative   Leuk Esterase: Small / RBC: 3-5 /HPF / WBC 6-10   Sq Epi: x / Non Sq Epi: Occasional / Bacteria: Moderate           WBC  TREND (5 Days)  WBC Count: 13.3 K/uL ( @ 05:06)    HGB  TREND (5 Days)  Hemoglobin: 11.7 g/dL ( @ 05:06)    HCT  TREND (5 Days)  Hematocrit: 36.0 % ( @ 05:06)    PLT  TREND (5 Days)  Platelet Count - Automated: 135 K/uL ( @ 05:06)        [BLOOD SMEAR INTERPRETATION: ]  RBC:   WBC:   Plts:       [RADIOLOGY & ADDITIONAL STUDIES:] Patient is a 89y old  Female who presents with a chief complaint of SOB (2017 09:29)        HPI:  89 yr old with PMH of Dementia,depression,severe aortic stenosis, chf, CVA- haemorruagic stroke in 2005,severe GI bleed 4 yrs back,pleural effusion, gait disbility following CVA,possible parkinsonafter CVa,hypothyroid, lactose intolerant,allergic to penicillin and contrast dye,recently treated for similar complaint with chf copd renal failure , sent back for SOB, IN ED found to have b/l pleural effusion worsened , and is being admitted for further care, has  leucocytosis, no fever , no chills.no nauasea no vomiting, on pureed diet (2017 09:29)    Heme asked to consult on pt for high WBC.   Son at bedside. Per son Hiren, (Podiatrist), states mother was dx wtih "CLL" when she last lived in [place]. ~, pt suffered CVA, spontaneous subdural hemorrhage, and had PICA aneurysm, required clipping, all while living in [place]. Subsequently brought back to [place], and since in care facilities, now at [facility]. Pt also with hx fo CHF, aortic insufficiency, HTN. Sent to hospital due to SOB.     PAST MEDICAL & SURGICAL HISTORY:  CLL (chronic lymphocytic leukemia)  Dementia with behavioral disturbance, unspecified dementia type  Gastrointestinal hemorrhage, unspecified gastrointestinal hemorrhage type  Nontraumatic intracerebral hemorrhage, unspecified cerebral location, unspecified laterality  Rhinitis  Anxiety  GERD (gastroesophageal reflux disease)  Hypothyroidism  Coronary artery disease  COPD (chronic obstructive pulmonary disease)  Closed fracture of neck of left femur, initial encounter        HEALTH ISSUES - PROBLEM Dx:  Leukocytosis, unspecified type: Leukocytosis, unspecified type  Dysphagia, unspecified type: Dysphagia, unspecified type  Gastroesophageal reflux disease without esophagitis: Gastroesophageal reflux disease without esophagitis  CKD (chronic kidney disease) stage 3, GFR 30-59 ml/min: CKD (chronic kidney disease) stage 3, GFR 30-59 ml/min  Chronic obstructive pulmonary disease, unspecified COPD type: Chronic obstructive pulmonary disease, unspecified COPD type  Dementia with behavioral disturbance, unspecified dementia type: Dementia with behavioral disturbance, unspecified dementia type  Acute on chronic combined systolic and diastolic congestive heart failure: Acute on chronic combined systolic and diastolic congestive heart failure  CLL (chronic lymphocytic leukemia): CLL (chronic lymphocytic leukemia)  COPD (chronic obstructive pulmonary disease): COPD (chronic obstructive pulmonary disease)            FAMILY HISTORY:  father  ~46yo from acute leukemia  mother  ~91yo from old age.   twin sister had CVA and anerysm, alive.   1 son, 1 dtr.     [SOCIAL HISTORY: ]  smoking: ex-smoker, in youth  EtOH: no EtOH  illicit drugs: no illicit drugs  occupation: clerical work  marital status:  since         [ALLERGIES/INTOLERANCES:]  Allergies      IV Contrast (Unknown)      penicillin (Unknown)  Intolerances          MEDICATIONS  (STANDING):  buPROPion XL . 150 milliGRAM(s) Oral daily  carbidopa/levodopa  25/100 1 Tablet(s) Oral daily  carvedilol 3.125 milliGRAM(s) Oral every 12 hours  furosemide   Injectable 20 milliGRAM(s) IV Push two times a day  hydrALAZINE 50 milliGRAM(s) Oral every 8 hours  isosorbide   dinitrate Tablet (ISORDIL) 10 milliGRAM(s) Oral three times a day  levothyroxine 100 MICROGram(s) Oral daily  multivitamin 1 Tablet(s) Oral daily  pantoprazole    Tablet 40 milliGRAM(s) Oral before breakfast    MEDICATIONS  (PRN):  acetaminophen   Tablet 650 milliGRAM(s) Oral every 6 hours PRN For Temp greater than 38 C (100.4 F)  acetaminophen   Tablet. 650 milliGRAM(s) Oral every 6 hours PRN Mild Pain (1 - 3)  ALBUTerol    0.083% 2.5 milliGRAM(s) Nebulizer every 6 hours PRN Shortness of Breath and/or Wheezing        [REVIEW OF SYSTEMS: ]  CONSTITUTIONAL: normal   EYES: No eye pain, no visual disturbances, + discharge  ENMT:  no discharge  NECK: No pain, no stiffness  BREASTS: No pain, no masses, no nipple discharge  RESPIRATORY: No cough, no wheezing, no chills, no hemoptysis; + shortness of breath  CARDIOVASCULAR: No chest pain, no palpitations, no dizziness, no leg swelling  GASTROINTESTINAL: No abdominal or epigastric pain. No nausea, no vomiting, no hematemesis; No diarrhea.  No melena, no hematochezia.  GENITOURINARY: No dysuria, no frequency, no hematuria, no incontinence  NEUROLOGICAL: No headaches, + memory loss, no loss of strength, no numbness, no tremors  SKIN: No itching, no burning, no rashes, no lesions   LYMPH NODES: No enlarged glands  ENDOCRINE: No heat or cold intolerance; No hair loss  MUSCULOSKELETAL: No joint pain or swelling; No muscle, no back, or extremity pain  PSYCHIATRIC: No depression, no anxiety, no mood swings, no difficulty sleeping  HEME/LYMPH: No easy bruising, no bleeding gums        Vital Signs Last 24 Hrs  T(C): 36.6 (2017 17:43), Max: 37.2 (2017 04:24)  T(F): 97.8 (2017 17:43), Max: 98.9 (2017 04:24)  HR: 65 (2017 17:43) (65 - 71)  BP: 127/65 (2017 17:43) (127/65 - 181/82)  BP(mean): 96 (2017 06:14) (90 - 96)  RR: 20 (2017 17:43) (20 - 28)  SpO2: 92% (2017 17:43) (92% - 100%)      [PHYSICAL EXAM]  General: elderly frail F, WN, WD, NAD  HEENT: clear oropharynx, slight discharge to eyes, no erythema  Neck: supple, no thyroid mass  CV: normal S1S2 RRR, no murmur, no rubs, no gallops  Lungs: clear to auscultation BL, no wheezes, no rales, no rhonchi  Abdomen: soft non-tender non-distended, no hepatomegaly, no splenomegaly  Ext: no clubbing, no cyanosis, no edema  Skin: no rashes, no petechiae  Neuro: alert and oriented X1, no focal weakness.   LN: no axillar LN, no SC LN, no ing LN.       [LABS:]                        11.7   13.3  )-----------( 135      ( 2017 05:06 )             36.0     CBC Full  -  ( 2017 05:06 )  WBC Count : 13.3 K/uL  Hemoglobin : 11.7 g/dL  Hematocrit : 36.0 %  Platelet Count - Automated : 135 K/uL  Mean Cell Volume : 89.4 fl  Mean Cell Hemoglobin : 29.0 pg  Mean Cell Hemoglobin Concentration : 32.4 gm/dL  Auto Neutrophil # : 6.4 K/uL  Auto Lymphocyte # : 6.4 K/uL  Auto Monocyte # : 0.3 K/uL  Auto Eosinophil # : 0.0 K/uL  Auto Basophil # : 0.1 K/uL  Auto Neutrophil % : 48.2 %  Auto Lymphocyte % : 47.9 %  Auto Monocyte % : 02.6 %  Auto Eosinophil % : 0.4 %  Auto Basophil % : 0.9 %      141  |  108  |  36<H>  ----------------------------<  91  4.9   |  23  |  1.33<H>  Ca    8.6      2017 05:06  Mg     2.4       TPro  6.1  /  Alb  3.5  /  TBili  1.3<H>  /  DBili  x   /  AST  27  /  ALT  29  /  AlkPhos  151<H>        LIVER FUNCTIONS - ( 2017 05:06 )  Alb: 3.5 g/dL / Pro: 6.1 g/dL / ALK PHOS: 151 U/L / ALT: 29 U/L DA / AST: 27 U/L / GGT: x           CARDIAC MARKERS ( 2017 05:16 )  .049 ng/mL / x     / 64 U/L / x     / 2.9 ng/mL      Urinalysis Basic - ( 2017 06:53 )    Color: Pale Yellow / Appearance: Slightly Turbid / S.005 / pH: x  Gluc: x / Ketone: Negative  / Bili: Negative / Urobili: Negative mg/dL   Blood: x / Protein: 15 mg/dL / Nitrite: Negative   Leuk Esterase: Small / RBC: 3-5 /HPF / WBC 6-10   Sq Epi: x / Non Sq Epi: Occasional / Bacteria: Moderate           WBC  TREND (5 Days)  WBC Count: 13.3 K/uL ( @ 05:06)    HGB  TREND (5 Days)  Hemoglobin: 11.7 g/dL ( @ 05:06)    HCT  TREND (5 Days)  Hematocrit: 36.0 % ( @ 05:06)    PLT  TREND (5 Days)  Platelet Count - Automated: 135 K/uL ( @ 05:06)        [BLOOD SMEAR INTERPRETATION: ]  RBC: normocytic, normochromic, no NRBC, no schitocytes  WBC: mainly segs and lymphs. rare smudge cells.   Plts: normal number, no clumps, no large plts, no giant plts.      [RADIOLOGY & ADDITIONAL STUDIES:] Patient is a 89y old  Female who presents with a chief complaint of SOB (2017 09:29)        HPI:  89 yr old with PMH of Dementia,depression,severe aortic stenosis, chf, CVA- haemorruagic stroke in 2005,severe GI bleed 4 yrs back,pleural effusion, gait disbility following CVA,possible parkinsonafter CVa,hypothyroid, lactose intolerant,allergic to penicillin and contrast dye,recently treated for similar complaint with chf copd renal failure , sent back for SOB, IN ED found to have b/l pleural effusion worsened , and is being admitted for further care, has  leucocytosis, no fever , no chills.no nauasea no vomiting, on pureed diet (2017 09:29)    Heme asked to consult on pt for high WBC.   Son at bedside. Per son Hiren, (Podiatrist), states mother was dx wtih "CLL" when she last lived in [place]. ~, pt suffered CVA, spontaneous subdural hemorrhage, and had PICA aneurysm, required clipping, all while living in [place]. Subsequently brought back to [place], and since in care facilities, now at [facility]. Pt also with hx fo CHF, aortic insufficiency, HTN. Sent to hospital due to SOB.     PAST MEDICAL & SURGICAL HISTORY:  CLL (chronic lymphocytic leukemia)  Dementia with behavioral disturbance, unspecified dementia type  Gastrointestinal hemorrhage, unspecified gastrointestinal hemorrhage type  Nontraumatic intracerebral hemorrhage, unspecified cerebral location, unspecified laterality  Rhinitis  Anxiety  GERD (gastroesophageal reflux disease)  Hypothyroidism  Coronary artery disease  COPD (chronic obstructive pulmonary disease)  Closed fracture of neck of left femur, initial encounter        HEALTH ISSUES - PROBLEM Dx:  Leukocytosis, unspecified type: Leukocytosis, unspecified type  Dysphagia, unspecified type: Dysphagia, unspecified type  Gastroesophageal reflux disease without esophagitis: Gastroesophageal reflux disease without esophagitis  CKD (chronic kidney disease) stage 3, GFR 30-59 ml/min: CKD (chronic kidney disease) stage 3, GFR 30-59 ml/min  Chronic obstructive pulmonary disease, unspecified COPD type: Chronic obstructive pulmonary disease, unspecified COPD type  Dementia with behavioral disturbance, unspecified dementia type: Dementia with behavioral disturbance, unspecified dementia type  Acute on chronic combined systolic and diastolic congestive heart failure: Acute on chronic combined systolic and diastolic congestive heart failure  CLL (chronic lymphocytic leukemia): CLL (chronic lymphocytic leukemia)  COPD (chronic obstructive pulmonary disease): COPD (chronic obstructive pulmonary disease)            FAMILY HISTORY:  father  ~44yo from acute leukemia  mother  ~89yo from old age.   twin sister had CVA and anerysm, alive.   1 son, 1 dtr.     [SOCIAL HISTORY: ]  smoking: ex-smoker, in youth  EtOH: no EtOH  illicit drugs: no illicit drugs  occupation: clerical work  marital status:  since         [ALLERGIES/INTOLERANCES:]  Allergies      IV Contrast (Unknown)      penicillin (Unknown)  Intolerances          MEDICATIONS  (STANDING):  buPROPion XL . 150 milliGRAM(s) Oral daily  carbidopa/levodopa  25/100 1 Tablet(s) Oral daily  carvedilol 3.125 milliGRAM(s) Oral every 12 hours  furosemide   Injectable 20 milliGRAM(s) IV Push two times a day  hydrALAZINE 50 milliGRAM(s) Oral every 8 hours  isosorbide   dinitrate Tablet (ISORDIL) 10 milliGRAM(s) Oral three times a day  levothyroxine 100 MICROGram(s) Oral daily  multivitamin 1 Tablet(s) Oral daily  pantoprazole    Tablet 40 milliGRAM(s) Oral before breakfast    MEDICATIONS  (PRN):  acetaminophen   Tablet 650 milliGRAM(s) Oral every 6 hours PRN For Temp greater than 38 C (100.4 F)  acetaminophen   Tablet. 650 milliGRAM(s) Oral every 6 hours PRN Mild Pain (1 - 3)  ALBUTerol    0.083% 2.5 milliGRAM(s) Nebulizer every 6 hours PRN Shortness of Breath and/or Wheezing        [REVIEW OF SYSTEMS: ]  CONSTITUTIONAL: normal   EYES: No eye pain, no visual disturbances, + discharge  ENMT:  no discharge  NECK: No pain, no stiffness  BREASTS: No pain, no masses, no nipple discharge  RESPIRATORY: No cough, no wheezing, no chills, no hemoptysis; + shortness of breath  CARDIOVASCULAR: No chest pain, no palpitations, no dizziness, no leg swelling  GASTROINTESTINAL: No abdominal or epigastric pain. No nausea, no vomiting, no hematemesis; No diarrhea.  No melena, no hematochezia.  GENITOURINARY: No dysuria, no frequency, no hematuria, no incontinence  NEUROLOGICAL: No headaches, + memory loss, no loss of strength, no numbness, no tremors  SKIN: No itching, no burning, no rashes, no lesions   LYMPH NODES: No enlarged glands  ENDOCRINE: No heat or cold intolerance; No hair loss  MUSCULOSKELETAL: No joint pain or swelling; No muscle, no back, or extremity pain  PSYCHIATRIC: No depression, no anxiety, no mood swings, no difficulty sleeping  HEME/LYMPH: No easy bruising, no bleeding gums        Vital Signs Last 24 Hrs  T(C): 36.6 (2017 17:43), Max: 37.2 (2017 04:24)  T(F): 97.8 (2017 17:43), Max: 98.9 (2017 04:24)  HR: 65 (2017 17:43) (65 - 71)  BP: 127/65 (2017 17:43) (127/65 - 181/82)  BP(mean): 96 (2017 06:14) (90 - 96)  RR: 20 (2017 17:43) (20 - 28)  SpO2: 92% (2017 17:43) (92% - 100%)      [PHYSICAL EXAM]  General: elderly frail F, WN, WD, NAD  HEENT: clear oropharynx, slight discharge to eyes, no erythema  Neck: supple, no thyroid mass  CV: normal S1S2 RRR, no murmur, no rubs, no gallops  Lungs: dec BS at bases. no wheezes, no rhonchi  Abdomen: soft non-tender non-distended, no hepatomegaly, no splenomegaly  Ext: no clubbing, no cyanosis, no edema  Skin: no rashes, no petechiae  Neuro: alert and oriented X1, no focal weakness.   LN: no axillar LN, no SC LN, shotty R submandibular LN, small shotty R ing LN.       [LABS:]                        11.7   13.3  )-----------( 135      ( 2017 05:06 )             36.0     CBC Full  -  ( 2017 05:06 )  WBC Count : 13.3 K/uL  Hemoglobin : 11.7 g/dL  Hematocrit : 36.0 %  Platelet Count - Automated : 135 K/uL  Mean Cell Volume : 89.4 fl  Mean Cell Hemoglobin : 29.0 pg  Mean Cell Hemoglobin Concentration : 32.4 gm/dL  Auto Neutrophil # : 6.4 K/uL  Auto Lymphocyte # : 6.4 K/uL  Auto Monocyte # : 0.3 K/uL  Auto Eosinophil # : 0.0 K/uL  Auto Basophil # : 0.1 K/uL  Auto Neutrophil % : 48.2 %  Auto Lymphocyte % : 47.9 %  Auto Monocyte % : 02.6 %  Auto Eosinophil % : 0.4 %  Auto Basophil % : 0.9 %      141  |  108  |  36<H>  ----------------------------<  91  4.9   |  23  |  1.33<H>  Ca    8.6      2017 05:06  Mg     2.4       TPro  6.1  /  Alb  3.5  /  TBili  1.3<H>  /  DBili  x   /  AST  27  /  ALT  29  /  AlkPhos  151<H>        LIVER FUNCTIONS - ( 2017 05:06 )  Alb: 3.5 g/dL / Pro: 6.1 g/dL / ALK PHOS: 151 U/L / ALT: 29 U/L DA / AST: 27 U/L / GGT: x           CARDIAC MARKERS ( 2017 05:16 )  .049 ng/mL / x     / 64 U/L / x     / 2.9 ng/mL      Urinalysis Basic - ( 2017 06:53 )    Color: Pale Yellow / Appearance: Slightly Turbid / S.005 / pH: x  Gluc: x / Ketone: Negative  / Bili: Negative / Urobili: Negative mg/dL   Blood: x / Protein: 15 mg/dL / Nitrite: Negative   Leuk Esterase: Small / RBC: 3-5 /HPF / WBC 6-10   Sq Epi: x / Non Sq Epi: Occasional / Bacteria: Moderate           WBC  TREND (5 Days)  WBC Count: 13.3 K/uL ( @ 05:06)    HGB  TREND (5 Days)  Hemoglobin: 11.7 g/dL ( @ 05:06)    HCT  TREND (5 Days)  Hematocrit: 36.0 % ( @ 05:06)    PLT  TREND (5 Days)  Platelet Count - Automated: 135 K/uL ( @ 05:06)        [BLOOD SMEAR INTERPRETATION: ]  RBC: normocytic, normochromic, no NRBC, no schitocytes  WBC: mainly segs and lymphs. rare smudge cells.   Plts: normal number, no clumps, no large plts, no giant plts.      [RADIOLOGY & ADDITIONAL STUDIES:]    < from: CT Chest No Cont (17 @ 09:32) >  EXAM:  CT CHEST                        PROCEDURE DATE:  2017    INTERPRETATION:  History: Effusions. Respiratory distress.    Findings: Helical axial images through the chest was performed without   administration of intravenous contrast as per request. Sagittal and   coronal reformat images as well as MIP reconstructed images are also   provided.    Bilateral moderate to large sized layering pleural effusions seen with   mild overlying compressive atelectasis and/or infiltrate. The heart is   enlarged. Coronary artery calcifications noted. Aorto vascular   calcification without aneurysmal dilatation of the thoracic aorta. No   significant lymphadenopathy appreciated.    Limited noncontrast images through the upper abdomen are grossly   unremarkable. Cholecystectomy clips noted. Apparent shrunken left kidney   partially imaged. Spleen is questionably enlarged, only partially imaged.    Kyphosis with degenerative changes of the spine.    Impression:  Moderate to large sized layering pleural effusions seen bilaterally with   mild overlying compressive atelectasis and/or infiltrate.  Cardiomegaly.  Additional findings as described.  GEORGE VIGIL M.D. ATTENDING RADIOLOGIST  This document has been electronically signed. 2017 10:07AM                < end of copied text >

## 2017-11-19 NOTE — H&P ADULT - ASSESSMENT
89 yr old with PMH of Dementia,depression,severe aortic stenosis, chf, CVA- haemorruagic stroke in 2005,severe GI bleed 4 yrs back,pleural effusion, gait disbility following CVA,possible parkinsonafter CVa,hypothyroid, lactose intolerant,allergic to penicillin and contrast dye,recently treated for similar complaint with chf copd renal failure , sent back for SOB, IN ED found to have b/l pleural effusion worsened , and is being admitted for further care, has  leucocytosis, no fever , no chills.no nauasea no vomiting, on pureed diet  admitting with mild respiratory failure with possible CHF diastolic with copd with leucocytosis, asymptomatic bacteruria, with h/o ESBL e coli colonization, with valvular heart disease aortic valve disease fraility severe aortic incompetence with CKD 3., GERD. b/l pleural effusion

## 2017-11-19 NOTE — CONSULT NOTE ADULT - PROBLEM SELECTOR RECOMMENDATION 4
supportive care  assist with ADL  fall prec  oral and skin care  pt is DNR DNI  resides in [facility]

## 2017-11-19 NOTE — CONSULT NOTE ADULT - SUBJECTIVE AND OBJECTIVE BOX
CARDIOLOGY CONSULT NOTE    Patient is a 89y Female with a known history of :  Leukocytosis, unspecified type (D72.829)  Dysphagia, unspecified type (R13.10)  Gastroesophageal reflux disease without esophagitis (K21.9)  CKD (chronic kidney disease) stage 3, GFR 30-59 ml/min (N18.3)  Chronic obstructive pulmonary disease, unspecified COPD type (J44.9)  Dementia with behavioral disturbance, unspecified dementia type (F03.91)  Acute on chronic combined systolic and diastolic congestive heart failure (I50.43)  CLL (chronic lymphocytic leukemia) (C91.10)  COPD (chronic obstructive pulmonary disease) (J44.9)    HPI:  89 yr old with PMH of Dementia,depression,severe aortic stenosis, chf, CVA- haemorruagic stroke in 2005,severe GI bleed 4 yrs back,pleural effusion, gait disbility following CVA,possible parkinsonafter CVa,hypothyroid, lactose intolerant,allergic to penicillin and contrast dye,recently treated for similar complaint with chf copd renal failure , sent back for SOB, IN ED found to have b/l pleural effusion worsened , and is being admitted for further care, has  leucocytosis, no fever , no chills.no nauasea no vomiting, on pureed diet (19 Nov 2017 09:29)      REVIEW OF SYSTEMS:    CONSTITUTIONAL: No fever, weight loss, or fatigue  EYES: No eye pain, visual disturbances, or discharge  ENMT:  No difficulty hearing, tinnitus, vertigo; No sinus or throat pain  NECK: No pain or stiffness  BREASTS: No pain, masses, or nipple discharge  RESPIRATORY: No cough, wheezing, chills or hemoptysis; No shortness of breath  CARDIOVASCULAR: No chest pain, palpitations, dizziness, or leg swelling  GASTROINTESTINAL: No abdominal or epigastric pain. No nausea, vomiting, or hematemesis; No diarrhea or constipation. No melena or hematochezia.  GENITOURINARY: No dysuria, frequency, hematuria, or incontinence  NEUROLOGICAL: No headaches, memory loss, loss of strength, numbness, or tremors  SKIN: No itching, burning, rashes, or lesions   LYMPH NODES: No enlarged glands  ENDOCRINE: No heat or cold intolerance; No hair loss  MUSCULOSKELETAL: No joint pain or swelling; No muscle, back, or extremity pain  HEME/LYMPH: No easy bruising, or bleeding gums  ALLERGY AND IMMUNOLOGIC: No hives or eczema    MEDICATIONS  (STANDING):  buPROPion XL . 150 milliGRAM(s) Oral daily  carbidopa/levodopa  25/100 1 Tablet(s) Oral daily  carvedilol 3.125 milliGRAM(s) Oral every 12 hours  furosemide   Injectable 20 milliGRAM(s) IV Push two times a day  hydrALAZINE 50 milliGRAM(s) Oral every 8 hours  isosorbide   dinitrate Tablet (ISORDIL) 10 milliGRAM(s) Oral three times a day  levothyroxine 100 MICROGram(s) Oral daily  multivitamin 1 Tablet(s) Oral daily  pantoprazole    Tablet 40 milliGRAM(s) Oral before breakfast    MEDICATIONS  (PRN):  acetaminophen   Tablet 650 milliGRAM(s) Oral every 6 hours PRN For Temp greater than 38 C (100.4 F)  acetaminophen   Tablet. 650 milliGRAM(s) Oral every 6 hours PRN Mild Pain (1 - 3)  ALBUTerol    0.083% 2.5 milliGRAM(s) Nebulizer every 6 hours PRN Shortness of Breath and/or Wheezing      ALLERGIES: IV Contrast (Unknown)  penicillin (Unknown)      FAMILY HISTORY:      Social History:  Alochol:   Smoking:   Drug Use:   Marital Status:     I&O's Detail      PHYSICAL EXAMINATION:  -----------------------------  T(C): 36.8 (11-19-17 @ 07:55), Max: 37.2 (11-19-17 @ 04:24)  HR: 69 (11-19-17 @ 07:55) (65 - 70)  BP: 177/86 (11-19-17 @ 07:55) (165/78 - 181/82)  RR: 28 (11-19-17 @ 07:55) (24 - 28)  SpO2: 100% (11-19-17 @ 06:50) (94% - 100%)  Wt(kg): --    Height (cm): 162.56 (11-19 @ 04:24)  Weight (kg): 43.1 (11-19 @ 04:24)  BMI (kg/m2): 16.3 (11-19 @ 04:24)  BSA (m2): 1.43 (11-19 @ 04:24)    Constitutional: Cachetic appearing.  Eyes: the conjunctiva exhibited no abnormalities and the eyelids demonstrated no xanthelasmas.   HEENT: normal oral mucosa, no oral pallor and no oral cyanosis.   Neck: normal jugular venous A waves present, normal jugular venous V waves present and no jugular venous leong A waves.   Pulmonary: B/L decreased breath sounds at bases.   Cardiovascular: heart rate and rhythm were normal, normal S1 and S2 and no murmur, gallop, rub, heave or thrill are present.   Musculoskeletal: the gait could not be assessed.  Extremities: no clubbing of the fingernails, no localized cyanosis, no petechial hemorrhages and no ischemic changes.   Skin: B/L 1-Plus edema.    LABS:   --------  11-19    141  |  108  |  36<H>  ----------------------------<  91  4.9   |  23  |  1.33<H>    Ca    8.6      19 Nov 2017 05:06    TPro  6.1  /  Alb  3.5  /  TBili  1.3<H>  /  DBili  x   /  AST  27  /  ALT  29  /  AlkPhos  151<H>  11-19                         11.7   13.3  )-----------( 135      ( 19 Nov 2017 05:06 )             36.0       11-19 @ 05:16 BNP: 15032 pg/mL    11-19 @ 05:16 CPK total:--, CKMB --, Troponin I - .049 ng/mL [.017 - .056]            RADIOLOGY:  -----------------    < from: US Transthoracic Echocardiogram w/Doppler Complete (10.02.17 @ 09:05) >    EXAM:  US TTE W DOPPLER COMPLETE                                  PROCEDURE DATE:  10/02/2017          INTERPRETATION:    Indication: CHF    Technician: Mannie Tolliver    Study Quality:  fair     Measurements:   A root3.0 cm,LA 5.9cm, LVEDD 4.5cm,2.6cm, septal wall   thickness 1.3 cm, posterior wall thickness 1.3 cm   AV velocity 1,7   m/sec, MV velocity 1.1 m /sec  EF 65%    Mitral Valve: mild mitral annular calcification .thickened mitral valve   with moderate regurgitation ,  Aortic Valve:Thickened aortic valve with moderate to  severe   regurgitation   Left Atrium:  severely enlarged  Left Ventricle: Normal size , moderate left ventricular hypertrophy with   normal EF   ,  Pericardium: Trace pericardial effusion   Tricuspid Valve: Appears normal with mild regurgitation with RVSp 31  mm   hg   Pulmonic Valve: appears normal with mild regurgitation   Right Ventricle: Normal size with normal systolic function   Right Atrium: enlarged    Large pleural effusion noted     SUMMARY:  1. Left ventricular hypertrophy with normal EF  2. Biatrial enlargement   3. aortic sclerosis moderate to  severe AI   4. mild Tricuspid regurgitation with RVSP 31 mm hg .  5. Moderate mitral regurgitation.                  M[facility]/CARDIOLOGY  This document has been electronically signed. Oct  3 2017  8:16AM                < end of copied text >    < from: CT Chest No Cont (11.19.17 @ 09:32) >    EXAM:  CT CHEST                                  PROCEDURE DATE:  11/19/2017          INTERPRETATION:  History: Effusions. Respiratory distress.    Findings: Helical axial images through the chest was performed without   administration of intravenous contrast as per request. Sagittal and   coronal reformat images as well as MIP reconstructed images are also   provided.    Bilateral moderate to large sized layering pleural effusions seen with   mild overlying compressive atelectasis and/or infiltrate. The heart is   enlarged. Coronary artery calcifications noted. Aorto vascular   calcification without aneurysmal dilatation of the thoracic aorta. No   significant lymphadenopathy appreciated.    Limited noncontrast images through the upper abdomen are grossly   unremarkable. Cholecystectomy clips noted. Apparent shrunken left kidney   partially imaged. Spleen is questionably enlarged, only partially imaged.    Kyphosis with degenerative changes of the spine.    Impression:    Moderate to large sized layering pleural effusions seen bilaterally with   mild overlying compressive atelectasis and/or infiltrate.    Cardiomegaly.    Additional findings as described.                  GEORGE VIGIL M.D. ATTENDING RADIOLOGIST  This document has been electronically signed. Nov 19 2017 10:07AM                < end of copied text >    < from: Xray Chest 1 View AP/PA (11.19.17 @ 04:53) >    EXAM:  CHEST (SINGLE AP PA)                                  PROCEDURE DATE:  11/19/2017          INTERPRETATION:  History: Shortness of breath. History of COPD.    Comparison: 10/9/2017.    Moderate sized bibasilar pleural effusions again noted. Overlying   atelectasis and/or infiltrates cannot be excluded. Heart borders are   obscured. Calcified aorta. Mild pulmonary vascular congestion.   Degenerative changes of the spine.    Impression:    Moderate sized bibasilar pleural effusions again noted. Overlying   atelectasis and/or infiltrates cannot be excluded.                  GEORGE VIGIL M.D. ATTENDING RADIOLOGIST  This document has been electronically signed. Nov 19 2017  8:29AM                < end of copied text >      ECG: NSR, LAD/LAHB, no acute changes.

## 2017-11-19 NOTE — H&P ADULT - HISTORY OF PRESENT ILLNESS
89 yr old with PMH of Dementia,depression,severe aortic stenosis, chf, CVA- haemorruagic stroke in 2005,severe GI bleed 4 yrs back,pleural effusion, gait disbility following CVA,possible parkinsonafter CVa,hypothyroid, lactose intolerant,allergic to penicillin and contrast dye,recently treated for similar complaint with chf copd renal failure , sent back for SOB, IN ED found to have b/l pleural effusion worsened , and is being admitted for further care, has  leucocytosis, no fever , no chills.no nauasea no vomiting, on pureed diet

## 2017-11-19 NOTE — ED PROVIDER NOTE - MUSCULOSKELETAL, MLM
very kyphotic thoracic spine, pt unable to lift legs off stretcher (states will do it later), +pitting edema - left tr, right 1+

## 2017-11-19 NOTE — ED PROVIDER NOTE - OBJECTIVE STATEMENT
89 y.o. F BIBJUANITA from [facility], pt was reported to have been SOB around 2am, was given nebulizer treatment, on EMS arrival pt appeared comfortable and in no respiratory distress, EMS was told to transport for evaluation. 89 y.o. F BIBEMS from [facility], pt was reported to have been SOB around 2am, was given nebulizer treatment, on EMS arrival pt appeared comfortable and in no respiratory distress, EMS was told to transport for evaluation. pt not sure why she is here. does think she remembers breathing treatment tonight. does think she's had a cough recently. denies fever. HPI limited by dementia. Pt denies SOB now

## 2017-11-19 NOTE — CONSULT NOTE ADULT - PROBLEM SELECTOR RECOMMENDATION 2
CLL hx  elevated WBC poss due to CLL  ID eval for leukocytosis eval, as pt has hx of Infection in the past and recently tx with ABX
mild, no transfusion needed.   Can check B12, folate, iron, and replete if low  Can also have done outpt when better.   No other urgent recommendations.     Can followup outpt to have further workup or just supportive measures only, if son wishes, given comorbidites and advanced age.     Will sign off on case for now. Please reconsult as needed

## 2017-11-19 NOTE — CONSULT NOTE ADULT - ASSESSMENT
Elderly frail F with reported hx of CLL.         I have discussed the above plan of care with patient/family in detail. They expressed understanding of the treatment plan . Risks, benefits and alternatives discussed in detail. I have asked if they have any questions or concerns and appropriately addressed them; all questions answered to their satisfactions and in lay terms.     > xxxxxx minutes spent in direct patient care, examining and counseling patient,  reviewing  the notes, lab data/ imaging , discussion with multidisciplinary team. Elderly frail F with reported hx of CLL (10yrs ago). WBC not particularly elevated. ? MBCL. ? SLL     Smear does not appear     I have discussed the above plan of care with patient/family in detail. They expressed understanding of the treatment plan . Risks, benefits and alternatives discussed in detail. I have asked if they have any questions or concerns and appropriately addressed them; all questions answered to their satisfactions and in lay terms.     > xxxxxx minutes spent in direct patient care, examining and counseling patient,  reviewing  the notes, lab data/ imaging , discussion with multidisciplinary team. Elderly frail F with reported hx of CLL (10yrs ago). WBC not particularly elevated. ? MBCL. ? SLL.     Smear does not appear toxic. However if has hx of CLL, and age may be immunocompromised and may have atypical response to infections.     Mild anemia. Can consider checking for treatable deficiencies such as B12, folate, iron. At current levels, does not require transfusion.     Being admitted with SOB, BL effusions and CHF sx.

## 2017-11-19 NOTE — CONSULT NOTE ADULT - ASSESSMENT
89y/o w/f seen at [facility] ER.  History Dementia, AI, pleural effusion, Parkinson's disease, CVA, CAD, CRI, COPD, thyroid disease, CHF, s/p GI bleed  Recently in hospital for shortness of breath.    Admitted for shortness of breath.  Given IV fluids, IV lasix and SL NTG.  "The breathing is better."    Impression:  Acute on chronic heart failure.  Known AI with normal EF.  R/O pulmonary pathology.    Plan:  - Continue prior medications.  - Lasix IV in am.  - Follow labs.  - Pulmonary consult. 89y/o w/f seen at [facility] ER.  History Dementia, AI, pleural effusion, CLL, Parkinson's disease, CVA, CAD, CRI, COPD, thyroid disease, CHF, s/p GI bleed  Recently in hospital for shortness of breath.    Admitted for shortness of breath.  Given IV fluids, IV lasix and SL NTG.  "The breathing is better."    Impression:  Acute on chronic heart failure.  Known AI with normal EF.  R/O pulmonary pathology.    Plan:  - Continue prior medications.  - Lasix IV in am.  - Follow labs.  - Pulmonary consult.

## 2017-11-20 ENCOUNTER — RESULT REVIEW (OUTPATIENT)
Age: 82
End: 2017-11-20

## 2017-11-20 DIAGNOSIS — K21.9 GASTRO-ESOPHAGEAL REFLUX DISEASE WITHOUT ESOPHAGITIS: ICD-10-CM

## 2017-11-20 DIAGNOSIS — J90 PLEURAL EFFUSION, NOT ELSEWHERE CLASSIFIED: ICD-10-CM

## 2017-11-20 LAB
ALBUMIN FLD-MCNC: 1 G/DL — SIGNIFICANT CHANGE UP
ALBUMIN FLD-MCNC: <0.5 G/DL — SIGNIFICANT CHANGE UP
ANION GAP SERPL CALC-SCNC: 9 MMOL/L — SIGNIFICANT CHANGE UP (ref 5–17)
B PERT IGG+IGM PNL SER: ABNORMAL
B PERT IGG+IGM PNL SER: CLEAR — SIGNIFICANT CHANGE UP
BUN SERPL-MCNC: 34 MG/DL — HIGH (ref 7–23)
CALCIUM SERPL-MCNC: 8.7 MG/DL — SIGNIFICANT CHANGE UP (ref 8.4–10.5)
CHLORIDE SERPL-SCNC: 107 MMOL/L — SIGNIFICANT CHANGE UP (ref 96–108)
CO2 SERPL-SCNC: 28 MMOL/L — SIGNIFICANT CHANGE UP (ref 22–31)
COLOR FLD: YELLOW — SIGNIFICANT CHANGE UP
COLOR FLD: YELLOW — SIGNIFICANT CHANGE UP
CREAT SERPL-MCNC: 1.6 MG/DL — HIGH (ref 0.5–1.3)
CULTURE RESULTS: SIGNIFICANT CHANGE UP
FLUID INTAKE SUBSTANCE CLASS: SIGNIFICANT CHANGE UP
FLUID INTAKE SUBSTANCE CLASS: SIGNIFICANT CHANGE UP
FLUID SEGMENTED GRANULOCYTES: 13 % — SIGNIFICANT CHANGE UP
FLUID SEGMENTED GRANULOCYTES: 17 % — SIGNIFICANT CHANGE UP
GLUCOSE FLD-MCNC: 109 MG/DL — SIGNIFICANT CHANGE UP
GLUCOSE FLD-MCNC: 110 MG/DL — SIGNIFICANT CHANGE UP
GLUCOSE SERPL-MCNC: 85 MG/DL — SIGNIFICANT CHANGE UP (ref 70–99)
GRAM STN FLD: SIGNIFICANT CHANGE UP
GRAM STN FLD: SIGNIFICANT CHANGE UP
HCT VFR BLD CALC: 34.9 % — SIGNIFICANT CHANGE UP (ref 34.5–45)
HGB BLD-MCNC: 11.5 G/DL — SIGNIFICANT CHANGE UP (ref 11.5–15.5)
LDH SERPL L TO P-CCNC: 216 U/L — SIGNIFICANT CHANGE UP (ref 50–242)
LDH SERPL L TO P-CCNC: 41 U/L — SIGNIFICANT CHANGE UP
LDH SERPL L TO P-CCNC: 44 U/L — SIGNIFICANT CHANGE UP
LYMPHOCYTES # FLD: 68 % — SIGNIFICANT CHANGE UP
LYMPHOCYTES # FLD: 72 % — SIGNIFICANT CHANGE UP
MCHC RBC-ENTMCNC: 30.2 PG — SIGNIFICANT CHANGE UP (ref 27–34)
MCHC RBC-ENTMCNC: 33 GM/DL — SIGNIFICANT CHANGE UP (ref 32–36)
MCV RBC AUTO: 91.4 FL — SIGNIFICANT CHANGE UP (ref 80–100)
MONOS+MACROS # FLD: 15 % — SIGNIFICANT CHANGE UP
MONOS+MACROS # FLD: 15 % — SIGNIFICANT CHANGE UP
PH FLD: 7.71 — SIGNIFICANT CHANGE UP
PH FLD: 7.92 — SIGNIFICANT CHANGE UP
PLATELET # BLD AUTO: 117 K/UL — LOW (ref 150–400)
POTASSIUM SERPL-MCNC: 3.6 MMOL/L — SIGNIFICANT CHANGE UP (ref 3.5–5.3)
POTASSIUM SERPL-SCNC: 3.6 MMOL/L — SIGNIFICANT CHANGE UP (ref 3.5–5.3)
PROT FLD-MCNC: 1.3 G/DL — SIGNIFICANT CHANGE UP
PROT FLD-MCNC: 1.4 G/DL — SIGNIFICANT CHANGE UP
RBC # BLD: 3.82 M/UL — SIGNIFICANT CHANGE UP (ref 3.8–5.2)
RBC # FLD: 14.6 % — HIGH (ref 10.3–14.5)
RCV VOL RI: 100 /UL — HIGH (ref 0–5)
RCV VOL RI: 2000 /UL — HIGH (ref 0–5)
SODIUM SERPL-SCNC: 144 MMOL/L — SIGNIFICANT CHANGE UP (ref 135–145)
SPECIMEN SOURCE FLD: SIGNIFICANT CHANGE UP
SPECIMEN SOURCE: SIGNIFICANT CHANGE UP
TOTAL NUCLEATED CELL COUNT, BODY FLUID: 123 /UL — HIGH (ref 0–5)
TOTAL NUCLEATED CELL COUNT, BODY FLUID: 74 /UL — HIGH (ref 0–5)
TUBE TYPE: SIGNIFICANT CHANGE UP
TUBE TYPE: SIGNIFICANT CHANGE UP
WBC # BLD: 10.5 K/UL — SIGNIFICANT CHANGE UP (ref 3.8–10.5)
WBC # FLD AUTO: 10.5 K/UL — SIGNIFICANT CHANGE UP (ref 3.8–10.5)

## 2017-11-20 PROCEDURE — 32555 ASPIRATE PLEURA W/ IMAGING: CPT | Mod: 50

## 2017-11-20 PROCEDURE — 88108 CYTOPATH CONCENTRATE TECH: CPT | Mod: 26

## 2017-11-20 PROCEDURE — 71010: CPT | Mod: 26,76

## 2017-11-20 PROCEDURE — 88305 TISSUE EXAM BY PATHOLOGIST: CPT | Mod: 26

## 2017-11-20 RX ADMIN — ISOSORBIDE DINITRATE 10 MILLIGRAM(S): 5 TABLET ORAL at 21:49

## 2017-11-20 RX ADMIN — PANTOPRAZOLE SODIUM 40 MILLIGRAM(S): 20 TABLET, DELAYED RELEASE ORAL at 06:06

## 2017-11-20 RX ADMIN — CARBIDOPA AND LEVODOPA 1 TABLET(S): 25; 100 TABLET ORAL at 13:17

## 2017-11-20 RX ADMIN — Medication 50 MILLIGRAM(S): at 21:49

## 2017-11-20 RX ADMIN — CARVEDILOL PHOSPHATE 3.12 MILLIGRAM(S): 80 CAPSULE, EXTENDED RELEASE ORAL at 17:53

## 2017-11-20 RX ADMIN — Medication 100 MICROGRAM(S): at 06:06

## 2017-11-20 RX ADMIN — ISOSORBIDE DINITRATE 10 MILLIGRAM(S): 5 TABLET ORAL at 14:29

## 2017-11-20 RX ADMIN — CARVEDILOL PHOSPHATE 3.12 MILLIGRAM(S): 80 CAPSULE, EXTENDED RELEASE ORAL at 06:28

## 2017-11-20 RX ADMIN — Medication 50 MILLIGRAM(S): at 14:29

## 2017-11-20 RX ADMIN — BUPROPION HYDROCHLORIDE 150 MILLIGRAM(S): 150 TABLET, EXTENDED RELEASE ORAL at 13:17

## 2017-11-20 RX ADMIN — Medication 20 MILLIGRAM(S): at 17:52

## 2017-11-20 RX ADMIN — Medication 1 TABLET(S): at 13:17

## 2017-11-20 RX ADMIN — Medication 20 MILLIGRAM(S): at 06:06

## 2017-11-20 RX ADMIN — ISOSORBIDE DINITRATE 10 MILLIGRAM(S): 5 TABLET ORAL at 06:06

## 2017-11-20 NOTE — PHYSICAL THERAPY INITIAL EVALUATION ADULT - PERTINENT HX OF CURRENT PROBLEM, REHAB EVAL
Pt. s/p above surgery.
Pt. admitted from [address] assisted living with SOB. In ED found to have b/l pleural effusions.

## 2017-11-20 NOTE — BRIEF OPERATIVE NOTE - PROCEDURE
<<-----Click on this checkbox to enter Procedure Thoracentesis of both pleural cavities with imaging guidance  11/20/2017    Active  [place]MAN

## 2017-11-20 NOTE — PROGRESS NOTE ADULT - SUBJECTIVE AND OBJECTIVE BOX
Date/Time Patient Seen:  		  Referring MD:   Data Reviewed	       Patient is a 89y old  Female who presents with a chief complaint of SOB (19 Nov 2017 09:29)    in bed  seen and examined  vs and meds reviewed      Subjective/HPI     PAST MEDICAL & SURGICAL HISTORY:  CLL (chronic lymphocytic leukemia)  Dementia with behavioral disturbance, unspecified dementia type  Gastrointestinal hemorrhage, unspecified gastrointestinal hemorrhage type  Nontraumatic intracerebral hemorrhage, unspecified cerebral location, unspecified laterality  Rhinitis  Anxiety  GERD (gastroesophageal reflux disease)  Hypothyroidism  Coronary artery disease  COPD (chronic obstructive pulmonary disease)  Parkinson's disease  Closed fracture of neck of left femur, initial encounter        Medication list         MEDICATIONS  (STANDING):  buPROPion XL . 150 milliGRAM(s) Oral daily  carbidopa/levodopa  25/100 1 Tablet(s) Oral daily  carvedilol 3.125 milliGRAM(s) Oral every 12 hours  furosemide   Injectable 20 milliGRAM(s) IV Push two times a day  hydrALAZINE 50 milliGRAM(s) Oral every 8 hours  isosorbide   dinitrate Tablet (ISORDIL) 10 milliGRAM(s) Oral three times a day  levothyroxine 100 MICROGram(s) Oral daily  multivitamin 1 Tablet(s) Oral daily  pantoprazole    Tablet 40 milliGRAM(s) Oral before breakfast    MEDICATIONS  (PRN):  acetaminophen   Tablet 650 milliGRAM(s) Oral every 6 hours PRN For Temp greater than 38 C (100.4 F)  acetaminophen   Tablet. 650 milliGRAM(s) Oral every 6 hours PRN Mild Pain (1 - 3)  ALBUTerol    0.083% 2.5 milliGRAM(s) Nebulizer every 6 hours PRN Shortness of Breath and/or Wheezing         Vitals log        ICU Vital Signs Last 24 Hrs  T(C): 36.3 (20 Nov 2017 05:04), Max: 36.9 (19 Nov 2017 16:57)  T(F): 97.3 (20 Nov 2017 05:04), Max: 98.5 (19 Nov 2017 16:57)  HR: 55 (20 Nov 2017 05:04) (55 - 71)  BP: 140/72 (20 Nov 2017 05:04) (126/66 - 177/86)  BP(mean): --  ABP: --  ABP(mean): --  RR: 19 (20 Nov 2017 05:04) (19 - 28)  SpO2: 92% (20 Nov 2017 05:04) (92% - 98%)           Input and Output:  I&O's Detail      Lab Data                        11.7   13.3  )-----------( 135      ( 19 Nov 2017 05:06 )             36.0     11-19    141  |  108  |  36<H>  ----------------------------<  91  4.9   |  23  |  1.33<H>    Ca    8.6      19 Nov 2017 05:06  Mg     2.4     11-19    TPro  6.1  /  Alb  3.5  /  TBili  1.3<H>  /  DBili  x   /  AST  27  /  ALT  29  /  AlkPhos  151<H>  11-19      CARDIAC MARKERS ( 19 Nov 2017 05:16 )  .049 ng/mL / x     / 64 U/L / x     / 2.9 ng/mL        Review of Systems	      Objective     Physical Examination    head at  heart - s1s2  lungs - dec BS  abd - soft  frail and weak  confused      Pertinent Lab findings & Imaging      Desire:  NO   Adequate UO     I&O's Detail           Discussed with:     Cultures:	        Radiology

## 2017-11-20 NOTE — PHYSICAL THERAPY INITIAL EVALUATION ADULT - RANGE OF MOTION EXAMINATION, REHAB EVAL
bilateral lower extremity ROM was WFL (within functional limits)/bilateral upper extremity ROM was WFL (within functional limits)
right ankle n/t due to surgical precautions/no ROM deficits were identified

## 2017-11-20 NOTE — PHYSICAL THERAPY INITIAL EVALUATION ADULT - ADDITIONAL COMMENTS
Lives in [address] [facility] assisted living.  Pt with dementia.
Lives in house with wife.  4 stairs to enter with railing; no stairs inside.

## 2017-11-20 NOTE — DIETITIAN INITIAL EVALUATION ADULT. - FACTORS AFF FOOD INTAKE
Caodaism/ethnic/cultural/personal food preferences/other (specify)/difficulty chewing/difficulty swallowing/difficulty with food procurement/preparation/SOB, adv age

## 2017-11-20 NOTE — PROGRESS NOTE ADULT - SUBJECTIVE AND OBJECTIVE BOX
Patient is a 89y old  Female who presents with a chief complaint of SOB (2017 09:29)      INTERVAL HPI/OVERNIGHT EVENTS:c/o back pain    Home Medications:  bacitracin 500 units/g topical ointment: Apply topically to affected area once a day (2017 04:37)  buPROPion 150 mg/24 hours (XL) oral tablet, extended release: 1 tab(s) orally once a day (2017 04:37)  Calcium 600+D 600 mg-200 intl units oral tablet: 1 tab(s) orally once a day (2017 04:37)  carbidopa-levodopa 25 mg-100 mg oral tablet: 1 tab(s) orally once a day (2017 04:37)  carvedilol 3.125 mg oral tablet: 1 tab(s) orally every 12 hours (2017 04:37)  DuoNeb 0.5 mg-2.5 mg/3 mL inhalation solution: 3 milliliter(s) inhaled 2 times a day (2017 04:37)  fluticasone 50 mcg/inh nasal spray: 1 spray(s) in each nostril once a day (2017 04:37)  isosorbide dinitrate 30 mg oral tablet: 1 tab(s) orally once a day (2017 04:37)  Multiple Vitamins oral tablet: 1 tab(s) orally once a day (2017 04:37)  omeprazole 20 mg oral delayed release capsule: 1 cap(s) orally once a day (2017 04:37)  Symbicort 160 mcg-4.5 mcg/inh inhalation aerosol: 2 puff(s) inhaled 2 times a day (2017 04:37)      MEDICATIONS  (STANDING):  buPROPion XL . 150 milliGRAM(s) Oral daily  carbidopa/levodopa  25/100 1 Tablet(s) Oral daily  carvedilol 3.125 milliGRAM(s) Oral every 12 hours  furosemide   Injectable 20 milliGRAM(s) IV Push two times a day  hydrALAZINE 50 milliGRAM(s) Oral every 8 hours  isosorbide   dinitrate Tablet (ISORDIL) 10 milliGRAM(s) Oral three times a day  levothyroxine 100 MICROGram(s) Oral daily  multivitamin 1 Tablet(s) Oral daily  pantoprazole    Tablet 40 milliGRAM(s) Oral before breakfast    MEDICATIONS  (PRN):  acetaminophen   Tablet 650 milliGRAM(s) Oral every 6 hours PRN For Temp greater than 38 C (100.4 F)  acetaminophen   Tablet. 650 milliGRAM(s) Oral every 6 hours PRN Mild Pain (1 - 3)  ALBUTerol    0.083% 2.5 milliGRAM(s) Nebulizer every 6 hours PRN Shortness of Breath and/or Wheezing      Allergies    IV Contrast (Unknown)  penicillin (Unknown)    Intolerances          Vital Signs Last 24 Hrs  T(C): 36.3 (2017 05:04), Max: 36.9 (2017 16:57)  T(F): 97.3 (2017 05:04), Max: 98.5 (2017 16:57)  HR: 55 (2017 05:04) (55 - 71)  BP: 140/72 (2017 05:04) (126/66 - 157/63)  BP(mean): --  RR: 19 (2017 05:04) (19 - 28)  SpO2: 92% (2017 05:04) (92% - 98%)    LABS:                        11.5   10.5  )-----------( 117      ( 2017 08:02 )             34.9         144  |  107  |  34<H>  ----------------------------<  85  3.6   |  28  |  1.60<H>    Ca    8.7      2017 08:02  Mg     2.4         TPro  6.1  /  Alb  3.5  /  TBili  1.3<H>  /  DBili  x   /  AST  27  /  ALT  29  /  AlkPhos  151<H>        Urinalysis Basic - ( 2017 06:53 )    Color: Pale Yellow / Appearance: Slightly Turbid / S.005 / pH: x  Gluc: x / Ketone: Negative  / Bili: Negative / Urobili: Negative mg/dL   Blood: x / Protein: 15 mg/dL / Nitrite: Negative   Leuk Esterase: Small / RBC: 3-5 /HPF / WBC 6-10   Sq Epi: x / Non Sq Epi: Occasional / Bacteria: Moderate      CAPILLARY BLOOD GLUCOSE              11-20 @ 07:01  -   @ 10:05  --------------------------------------------------------  IN: 250 mL / OUT: 0 mL / NET: 250 mL              RADIOLOGY & ADDITIONAL TESTS:    Imaging Personally Reviewed:  [x] YES  [ ] NO    Consultant(s) Notes Reviewed:  [x ] YES  [ ] NO    Care Discussed with Consultants/Other Providers [x ] YES  [ ] NO

## 2017-11-20 NOTE — DIETITIAN INITIAL EVALUATION ADULT. - NS AS NUTRI INTERV MEALS SNACK
Texture-modified diet/pureed diet c nectar fld, rec ensure pudding tid for extra kcal/protein/Fluid - modified diet

## 2017-11-20 NOTE — CONSULT NOTE ADULT - SUBJECTIVE AND OBJECTIVE BOX
HPI:  89 yr old with PMH of Dementia, depression, severe aortic stenosis, chf, CVA-GIB  admitted with SOB. Has leukocytosis. CXR showed bilateral pleural effusions. No  documented fever chills n/v/diarrhea. Limited history as pt has dementia.    Infectious Disease consult was called to evaluate pt due to leukocytosis for poss pna.    Past Medical & Surgical Hx:  PAST MEDICAL & SURGICAL HISTORY:  CLL (chronic lymphocytic leukemia)  Dementia with behavioral disturbance, unspecified dementia type  Gastrointestinal hemorrhage, unspecified gastrointestinal hemorrhage type  Nontraumatic intracerebral hemorrhage, unspecified cerebral location, unspecified laterality  Rhinitis  Anxiety  GERD (gastroesophageal reflux disease)  Hypothyroidism  Coronary artery disease  COPD (chronic obstructive pulmonary disease)  Closed fracture of neck of left femur, initial encounter    Social History--  EtOH: denies   Tobacco: denies   Drug Use: denies     FAMILY HISTORY:  Noncontributory    Allergies  IV Contrast (Unknown)  penicillin (Unknown)    Home Medications:  bacitracin 500 units/g topical ointment: Apply topically to affected area once a day (19 Nov 2017 04:37)  buPROPion 150 mg/24 hours (XL) oral tablet, extended release: 1 tab(s) orally once a day (19 Nov 2017 04:37)  Calcium 600+D 600 mg-200 intl units oral tablet: 1 tab(s) orally once a day (19 Nov 2017 04:37)  carbidopa-levodopa 25 mg-100 mg oral tablet: 1 tab(s) orally once a day (19 Nov 2017 04:37)  carvedilol 3.125 mg oral tablet: 1 tab(s) orally every 12 hours (19 Nov 2017 04:37)  DuoNeb 0.5 mg-2.5 mg/3 mL inhalation solution: 3 milliliter(s) inhaled 2 times a day (19 Nov 2017 04:37)  fluticasone 50 mcg/inh nasal spray: 1 spray(s) in each nostril once a day (19 Nov 2017 04:37)  isosorbide dinitrate 30 mg oral tablet: 1 tab(s) orally once a day (19 Nov 2017 04:37)  Multiple Vitamins oral tablet: 1 tab(s) orally once a day (19 Nov 2017 04:37)  omeprazole 20 mg oral delayed release capsule: 1 cap(s) orally once a day (19 Nov 2017 04:37)  Symbicort 160 mcg-4.5 mcg/inh inhalation aerosol: 2 puff(s) inhaled 2 times a day (19 Nov 2017 04:37)      Current Inpatient Medications :    ANTIBIOTICS:   NONE    OTHER RELEVANT MEDICATIONS :  acetaminophen   Tablet 650 milliGRAM(s) Oral every 6 hours PRN  acetaminophen   Tablet. 650 milliGRAM(s) Oral every 6 hours PRN  ALBUTerol    0.083% 2.5 milliGRAM(s) Nebulizer every 6 hours PRN  buPROPion XL . 150 milliGRAM(s) Oral daily  carbidopa/levodopa  25/100 1 Tablet(s) Oral daily  carvedilol 3.125 milliGRAM(s) Oral every 12 hours  furosemide   Injectable 20 milliGRAM(s) IV Push two times a day  hydrALAZINE 50 milliGRAM(s) Oral every 8 hours  isosorbide   dinitrate Tablet (ISORDIL) 10 milliGRAM(s) Oral three times a day  levothyroxine 100 MICROGram(s) Oral daily  multivitamin 1 Tablet(s) Oral daily  pantoprazole    Tablet 40 milliGRAM(s) Oral before breakfast      ROS:  Unable to obtain due to : Confusion    I&O's Detail    20 Nov 2017 07:01  -  20 Nov 2017 11:07  --------------------------------------------------------  IN:    Oral Fluid: 250 mL  Total IN: 250 mL    OUT:  Total OUT: 0 mL    Total NET: 250 mL    Physical Exam:  Vital Signs Last 24 Hrs  T(C): 36.3 (20 Nov 2017 05:04), Max: 36.9 (19 Nov 2017 16:57)  T(F): 97.3 (20 Nov 2017 05:04), Max: 98.5 (19 Nov 2017 16:57)  HR: 61 (20 Nov 2017 10:22) (55 - 71)  BP: 140/72 (20 Nov 2017 05:04) (126/66 - 157/63)  BP(mean): --  RR: 19 (20 Nov 2017 05:04) (19 - 28)  SpO2: 92% (20 Nov 2017 05:04) (92% - 98%)      General: No acute distress Confused  Eyes: sclera anicteric, pupils equal and reactive to light  ENMT: buccal mucosa moist, pharynx not injected  Neck: supple, trachea midline  Lungs: clear, no wheeze/rhonchi  Cardiovascular: regular rate and rhythm, S1 S2  Abdomen: soft, nontender, no organomegaly present, bowel sounds normal  Neurological:  confused  Skin: no rash  Lymph Nodes: no palpable cervical/supraclavicular lymph nodes enlargements  Extremities: no cyanosis/clubbing/edema    Labs:                         11.5   10.5  )-----------( 117      ( 20 Nov 2017 08:02 )             34.9   11-20    144  |  107  |  34<H>  ----------------------------<  85  3.6   |  28  |  1.60<H>    Ca    8.7      20 Nov 2017 08:02  Mg     2.4     11-19    TPro  6.1  /  Alb  3.5  /  TBili  1.3<H>  /  DBili  x   /  AST  27  /  ALT  29  /  AlkPhos  151<H>  11-19      RECENT CULTURES:  PENDING    RADIOLOGY & ADDITIONAL STUDIES:    EXAM:  CT CHEST                                  PROCEDURE DATE:  11/19/2017          INTERPRETATION:  History: Effusions. Respiratory distress.    Findings: Helical axial images through the chest was performed without   administration of intravenous contrast as per request. Sagittal and   coronal reformat images as well as MIP reconstructed images are also   provided.    Bilateral moderate to large sized layering pleural effusions seen with   mild overlying compressive atelectasis and/or infiltrate. The heart is   enlarged. Coronary artery calcifications noted. Aorto vascular   calcification without aneurysmal dilatation of the thoracic aorta. No   significant lymphadenopathy appreciated.    Limited noncontrast images through the upper abdomen are grossly   unremarkable. Cholecystectomy clips noted. Apparent shrunken left kidney   partially imaged. Spleen is questionably enlarged, only partially imaged.    Kyphosis with degenerative changes of the spine.    Impression:    Moderate to large sized layering pleural effusions seen bilaterally with   mild overlying compressive atelectasis and/or infiltrate.    Cardiomegaly.    Assessment :   89 yr old with PMH of Dementia, depression, severe aortic stenosis, CHF, CVA, GIB  admitted with SOB likely CHF exacerbation  Leukocytosis reactive resolved  Doubt pna  CKD    Plan :   Monitor off antibiotic.  Aspiration precautions  Stable from ID standpoint    Continue with present regiment .  Appropriate use of antibiotics and adverse effects reviewed.    > 35 minutes spent in direct patient care reviewing  the notes, lab data/ imaging , discussion with multidisciplinary team. All questions were addressed and answered to the best of my capacity .    Thank you for allowing me to participate in the care of your patient .      Darien Boateng MD  Infectious Disease  [phone]

## 2017-11-21 DIAGNOSIS — J94.8 OTHER SPECIFIED PLEURAL CONDITIONS: ICD-10-CM

## 2017-11-21 LAB
NIGHT BLUE STAIN TISS: SIGNIFICANT CHANGE UP
SPECIMEN SOURCE: SIGNIFICANT CHANGE UP

## 2017-11-21 RX ORDER — FUROSEMIDE 40 MG
40 TABLET ORAL DAILY
Qty: 0 | Refills: 0 | Status: DISCONTINUED | OUTPATIENT
Start: 2017-11-21 | End: 2017-11-22

## 2017-11-21 RX ADMIN — CARBIDOPA AND LEVODOPA 1 TABLET(S): 25; 100 TABLET ORAL at 11:41

## 2017-11-21 RX ADMIN — ISOSORBIDE DINITRATE 10 MILLIGRAM(S): 5 TABLET ORAL at 21:49

## 2017-11-21 RX ADMIN — PANTOPRAZOLE SODIUM 40 MILLIGRAM(S): 20 TABLET, DELAYED RELEASE ORAL at 06:01

## 2017-11-21 RX ADMIN — ISOSORBIDE DINITRATE 10 MILLIGRAM(S): 5 TABLET ORAL at 06:01

## 2017-11-21 RX ADMIN — CARVEDILOL PHOSPHATE 3.12 MILLIGRAM(S): 80 CAPSULE, EXTENDED RELEASE ORAL at 17:59

## 2017-11-21 RX ADMIN — CARVEDILOL PHOSPHATE 3.12 MILLIGRAM(S): 80 CAPSULE, EXTENDED RELEASE ORAL at 11:41

## 2017-11-21 RX ADMIN — Medication 100 MICROGRAM(S): at 06:01

## 2017-11-21 RX ADMIN — ISOSORBIDE DINITRATE 10 MILLIGRAM(S): 5 TABLET ORAL at 14:29

## 2017-11-21 RX ADMIN — Medication 50 MILLIGRAM(S): at 21:49

## 2017-11-21 RX ADMIN — Medication 1 TABLET(S): at 14:29

## 2017-11-21 RX ADMIN — Medication 20 MILLIGRAM(S): at 06:01

## 2017-11-21 RX ADMIN — Medication 50 MILLIGRAM(S): at 14:29

## 2017-11-21 RX ADMIN — BUPROPION HYDROCHLORIDE 150 MILLIGRAM(S): 150 TABLET, EXTENDED RELEASE ORAL at 11:41

## 2017-11-21 RX ADMIN — Medication 50 MILLIGRAM(S): at 06:01

## 2017-11-21 NOTE — PROGRESS NOTE ADULT - SUBJECTIVE AND OBJECTIVE BOX
Date/Time Patient Seen:  		  Referring MD:   Data Reviewed	       Patient is a 89y old  Female who presents with a chief complaint of SOB (19 Nov 2017 09:29)    in bed  seen and examined  on o2    vs and meds reviewed      Subjective/HPI     PAST MEDICAL & SURGICAL HISTORY:  CLL (chronic lymphocytic leukemia)  Dementia with behavioral disturbance, unspecified dementia type  Gastrointestinal hemorrhage, unspecified gastrointestinal hemorrhage type  Nontraumatic intracerebral hemorrhage, unspecified cerebral location, unspecified laterality  Rhinitis  Anxiety  GERD (gastroesophageal reflux disease)  Hypothyroidism  Coronary artery disease  COPD (chronic obstructive pulmonary disease)  Parkinson's disease  Closed fracture of neck of left femur, initial encounter        Medication list         MEDICATIONS  (STANDING):  buPROPion XL . 150 milliGRAM(s) Oral daily  carbidopa/levodopa  25/100 1 Tablet(s) Oral daily  carvedilol 3.125 milliGRAM(s) Oral every 12 hours  furosemide   Injectable 20 milliGRAM(s) IV Push two times a day  hydrALAZINE 50 milliGRAM(s) Oral every 8 hours  isosorbide   dinitrate Tablet (ISORDIL) 10 milliGRAM(s) Oral three times a day  levothyroxine 100 MICROGram(s) Oral daily  multivitamin 1 Tablet(s) Oral daily  pantoprazole    Tablet 40 milliGRAM(s) Oral before breakfast    MEDICATIONS  (PRN):  acetaminophen   Tablet 650 milliGRAM(s) Oral every 6 hours PRN For Temp greater than 38 C (100.4 F)  acetaminophen   Tablet. 650 milliGRAM(s) Oral every 6 hours PRN Mild Pain (1 - 3)  ALBUTerol    0.083% 2.5 milliGRAM(s) Nebulizer every 6 hours PRN Shortness of Breath and/or Wheezing         Vitals log        ICU Vital Signs Last 24 Hrs  T(C): 36.4 (21 Nov 2017 05:15), Max: 37.1 (20 Nov 2017 17:20)  T(F): 97.6 (21 Nov 2017 05:15), Max: 98.7 (20 Nov 2017 17:20)  HR: 58 (21 Nov 2017 05:15) (58 - 82)  BP: 130/63 (21 Nov 2017 05:15) (116/65 - 165/73)  BP(mean): --  ABP: --  ABP(mean): --  RR: 18 (21 Nov 2017 05:15) (18 - 22)  SpO2: 94% (21 Nov 2017 05:15) (94% - 99%)           Input and Output:  I&O's Detail    20 Nov 2017 07:01  -  21 Nov 2017 07:00  --------------------------------------------------------  IN:    Oral Fluid: 490 mL  Total IN: 490 mL    OUT:  Total OUT: 0 mL    Total NET: 490 mL          Lab Data                        11.5   10.5  )-----------( 117      ( 20 Nov 2017 08:02 )             34.9     11-20    144  |  107  |  34<H>  ----------------------------<  85  3.6   |  28  |  1.60<H>    Ca    8.7      20 Nov 2017 08:02  Mg     2.4     11-19              Review of Systems	      Objective     Physical Examination    frail  weak  confused  head at  heart - s1s2  lungs - dec BS      Pertinent Lab findings & Imaging      Desire:  NO   Adequate UO     I&O's Detail    20 Nov 2017 07:01  -  21 Nov 2017 07:00  --------------------------------------------------------  IN:    Oral Fluid: 490 mL  Total IN: 490 mL    OUT:  Total OUT: 0 mL    Total NET: 490 mL               Discussed with:     Cultures:	        Radiology

## 2017-11-21 NOTE — PROGRESS NOTE ADULT - SUBJECTIVE AND OBJECTIVE BOX
Patient is a 89y old  Female who presents with a chief complaint of SOB (19 Nov 2017 09:29)      INTERVAL HPI/OVERNIGHT EVENTS:  feels better s/p thoracentesis  Home Medications:  bacitracin 500 units/g topical ointment: Apply topically to affected area once a day (19 Nov 2017 04:37)  buPROPion 150 mg/24 hours (XL) oral tablet, extended release: 1 tab(s) orally once a day (19 Nov 2017 04:37)  Calcium 600+D 600 mg-200 intl units oral tablet: 1 tab(s) orally once a day (19 Nov 2017 04:37)  carbidopa-levodopa 25 mg-100 mg oral tablet: 1 tab(s) orally once a day (19 Nov 2017 04:37)  carvedilol 3.125 mg oral tablet: 1 tab(s) orally every 12 hours (19 Nov 2017 04:37)  DuoNeb 0.5 mg-2.5 mg/3 mL inhalation solution: 3 milliliter(s) inhaled 2 times a day (19 Nov 2017 04:37)  fluticasone 50 mcg/inh nasal spray: 1 spray(s) in each nostril once a day (19 Nov 2017 04:37)  isosorbide dinitrate 30 mg oral tablet: 1 tab(s) orally once a day (19 Nov 2017 04:37)  Multiple Vitamins oral tablet: 1 tab(s) orally once a day (19 Nov 2017 04:37)  omeprazole 20 mg oral delayed release capsule: 1 cap(s) orally once a day (19 Nov 2017 04:37)  Symbicort 160 mcg-4.5 mcg/inh inhalation aerosol: 2 puff(s) inhaled 2 times a day (19 Nov 2017 04:37)      MEDICATIONS  (STANDING):  buPROPion XL . 150 milliGRAM(s) Oral daily  carbidopa/levodopa  25/100 1 Tablet(s) Oral daily  carvedilol 3.125 milliGRAM(s) Oral every 12 hours  furosemide    Tablet 40 milliGRAM(s) Oral daily  hydrALAZINE 50 milliGRAM(s) Oral every 8 hours  isosorbide   dinitrate Tablet (ISORDIL) 10 milliGRAM(s) Oral three times a day  levothyroxine 100 MICROGram(s) Oral daily  multivitamin 1 Tablet(s) Oral daily  pantoprazole    Tablet 40 milliGRAM(s) Oral before breakfast    MEDICATIONS  (PRN):  acetaminophen   Tablet 650 milliGRAM(s) Oral every 6 hours PRN For Temp greater than 38 C (100.4 F)  acetaminophen   Tablet. 650 milliGRAM(s) Oral every 6 hours PRN Mild Pain (1 - 3)  ALBUTerol    0.083% 2.5 milliGRAM(s) Nebulizer every 6 hours PRN Shortness of Breath and/or Wheezing      Allergies    IV Contrast (Unknown)  penicillin (Unknown)    Intolerances          Vital Signs Last 24 Hrs  T(C): 36.4 (21 Nov 2017 05:15), Max: 37.1 (20 Nov 2017 17:20)  T(F): 97.6 (21 Nov 2017 05:15), Max: 98.7 (20 Nov 2017 17:20)  HR: 58 (21 Nov 2017 05:15) (58 - 82)  BP: 130/63 (21 Nov 2017 05:15) (116/65 - 165/73)  BP(mean): --  RR: 18 (21 Nov 2017 05:15) (18 - 22)  SpO2: 94% (21 Nov 2017 05:15) (94% - 99%)    LABS:                        11.5   10.5  )-----------( 117      ( 20 Nov 2017 08:02 )             34.9     11-20    144  |  107  |  34<H>  ----------------------------<  85  3.6   |  28  |  1.60<H>    Ca    8.7      20 Nov 2017 08:02  Mg     2.4     11-19          CAPILLARY BLOOD GLUCOSE              11-20 @ 07:01  -  11-21 @ 07:00  --------------------------------------------------------  IN: 490 mL / OUT: 0 mL / NET: 490 mL          Culture - Fungal, Body Fluid (collected 11-20-17 @ 14:56)  Source: Pleural Fl left pleural  Preliminary Report (11-21-17 @ 08:45):    Testing in progress    Culture - Body Fluid with Gram Stain (collected 11-20-17 @ 14:56)  Source: Pleural Fl left pleural  Gram Stain (11-20-17 @ 15:38):    polymorphonuclear leukocytes per low power field    Red blood cells per low power field    No organisms seen per oil power field  Preliminary Report (11-21-17 @ 08:15):    No growth    Culture - Fungal, Body Fluid (collected 11-20-17 @ 14:50)  Source: .Body Fluid Pleural Fluid  Preliminary Report (11-21-17 @ 08:45):    Testing in progress    Culture - Body Fluid with Gram Stain (collected 11-20-17 @ 14:49)  Source: .Body Fluid Pleural Fluid  Gram Stain (11-20-17 @ 15:38):    polymorphonuclear leukocytes per low power field    Red blood cells per low power field    No organisms seen per oil power field  Preliminary Report (11-21-17 @ 08:15):    No growth    Culture - Urine (collected 11-19-17 @ 11:42)  Source: .Urine Clean Catch (Midstream)  Final Report (11-20-17 @ 12:33):    Culture grew 3 or more types of organisms which indicate    collection contamination; consider recollection only if clinically    indicated.    Culture - Blood (collected 11-19-17 @ 11:40)  Source: .Blood Blood-Peripheral  Preliminary Report (11-20-17 @ 12:04):    No growth to date.    Culture - Blood (collected 11-19-17 @ 11:40)  Source: .Blood Blood-Peripheral  Preliminary Report (11-20-17 @ 12:04):    No growth to date.          RADIOLOGY & ADDITIONAL TESTS:    Imaging Personally Reviewed:  [x ] YES  [ ] NO    Consultant(s) Notes Reviewed:  [x ] YES  [ ] NO    Care Discussed with Consultants/Other Providers [x ] YES  [ ] NO

## 2017-11-21 NOTE — PROGRESS NOTE ADULT - SUBJECTIVE AND OBJECTIVE BOX
Patient is a 89y Female with a known history of :  Transudative pleural effusion (J94.8)  GERD (gastroesophageal reflux disease) (K21.9)  Pleural effusion (J90)  Anemia due to other cause (D64.89)  Leukocytosis, unspecified type (D72.829)  Dysphagia, unspecified type (R13.10)  Gastroesophageal reflux disease without esophagitis (K21.9)  CKD (chronic kidney disease) stage 3, GFR 30-59 ml/min (N18.3)  Chronic obstructive pulmonary disease, unspecified COPD type (J44.9)  Dementia with behavioral disturbance, unspecified dementia type (F03.91)  Acute on chronic combined systolic and diastolic congestive heart failure (I50.43)  CLL (chronic lymphocytic leukemia) (C91.10)  COPD (chronic obstructive pulmonary disease) (J44.9)    HPI:  89 yr old with PMH of Dementia,depression,severe aortic stenosis, chf, CVA- haemorruagic stroke in 2005,severe GI bleed 4 yrs back,pleural effusion, gait disbility following CVA,possible parkinsonafter CVa,hypothyroid, lactose intolerant,allergic to penicillin and contrast dye,recently treated for similar complaint with chf copd renal failure , sent back for SOB, IN ED found to have b/l pleural effusion worsened , and is being admitted for further care, has  leucocytosis, no fever , no chills.no [place] no vomiting, on pureed diet (19 Nov 2017 09:29)      REVIEW OF SYSTEMS:    CONSTITUTIONAL: No fever, weight loss, or fatigue  EYES: No eye pain, visual disturbances, or discharge  ENMT:  No difficulty hearing, tinnitus, vertigo; No sinus or throat pain  NECK: No pain or stiffness  BREASTS: No pain, masses, or nipple discharge  RESPIRATORY: No cough, wheezing, chills or hemoptysis; No shortness of breath  CARDIOVASCULAR: No chest pain, palpitations, dizziness, or leg swelling  GASTROINTESTINAL: No abdominal or epigastric pain. No nausea, vomiting, or hematemesis; No diarrhea or constipation. No melena or hematochezia.  GENITOURINARY: No dysuria, frequency, hematuria, or incontinence  NEUROLOGICAL: No headaches, memory loss, loss of strength, numbness, or tremors  SKIN: No itching, burning, rashes, or lesions   LYMPH NODES: No enlarged glands  ENDOCRINE: No heat or cold intolerance; No hair loss  MUSCULOSKELETAL: No joint pain or swelling; No muscle, back, or extremity pain  HEME/LYMPH: No easy bruising, or bleeding gums  ALLERGY AND IMMUNOLOGIC: No hives or eczema    MEDICATIONS  (STANDING):  buPROPion XL . 150 milliGRAM(s) Oral daily  carbidopa/levodopa  25/100 1 Tablet(s) Oral daily  carvedilol 3.125 milliGRAM(s) Oral every 12 hours  furosemide    Tablet 40 milliGRAM(s) Oral daily  hydrALAZINE 50 milliGRAM(s) Oral every 8 hours  isosorbide   dinitrate Tablet (ISORDIL) 10 milliGRAM(s) Oral three times a day  levothyroxine 100 MICROGram(s) Oral daily  multivitamin 1 Tablet(s) Oral daily  pantoprazole    Tablet 40 milliGRAM(s) Oral before breakfast    MEDICATIONS  (PRN):  acetaminophen   Tablet 650 milliGRAM(s) Oral every 6 hours PRN For Temp greater than 38 C (100.4 F)  acetaminophen   Tablet. 650 milliGRAM(s) Oral every 6 hours PRN Mild Pain (1 - 3)  ALBUTerol    0.083% 2.5 milliGRAM(s) Nebulizer every 6 hours PRN Shortness of Breath and/or Wheezing      ALLERGIES: IV Contrast (Unknown)  penicillin (Unknown)      FAMILY HISTORY:      Social history:  Alochol:   Smoking:   Drug Use:   Marital Status:     PHYSICAL EXAMINATION:  -----------------------------  T(C): 36.4 (11-21-17 @ 05:15), Max: 37.1 (11-20-17 @ 17:20)  HR: 58 (11-21-17 @ 05:15) (58 - 82)  BP: 130/63 (11-21-17 @ 05:15) (116/65 - 165/73)  RR: 18 (11-21-17 @ 05:15) (18 - 22)  SpO2: 94% (11-21-17 @ 05:15) (94% - 99%)  Wt(kg): --    11-20 @ 07:01  -  11-21 @ 07:00  --------------------------------------------------------  IN:    Oral Fluid: 490 mL  Total IN: 490 mL    OUT:  Total OUT: 0 mL    Total NET: 490 mL            Constitutional: well developed, normal appearance, well groomed, well nourished, no deformities and no acute distress.   Eyes: the conjunctiva exhibited no abnormalities and the eyelids demonstrated no xanthelasmas.   HEENT: normal oral mucosa, no oral pallor and no oral cyanosis.   Neck: normal jugular venous A waves present, normal jugular venous V waves present and no jugular venous leong A waves.   Pulmonary: no respiratory distress, normal respiratory rhythm and effort, no accessory muscle use and lungs were clear to auscultation bilaterally? Anteriorly  Cardiovascular: heart rate and rhythm were normal, normal S1 and S2 and no murmur, gallop, rub, heave or thrill are present.    Musculoskeletal: the gait could not be assessed.   Extremities: no clubbing of the fingernails, no localized cyanosis, no petechial hemorrhages and no ischemic changes.   Skin: normal skin color and pigmentation, no rash, no venous stasis, no skin lesions, no skin ulcer and no xanthoma was observed.      LABS:   --------  11-20    144  |  107  |  34<H>  ----------------------------<  85  3.6   |  28  |  1.60<H>    Ca    8.7      20 Nov 2017 08:02  Mg     2.4     11-19                           11.5   10.5  )-----------( 117      ( 20 Nov 2017 08:02 )             34.9             Culture Results:   No growth (11-20 @ 14:56)  Culture Results:   Testing in progress (11-20 @ 14:56)  Culture Results:   Testing in progress (11-20 @ 14:50)    11-20 @ 14:56    Organism --   Gram Stain Blood -- Gram Stain   polymorphonuclear leukocytes per low power field  Red blood cells per low power field  No organisms seen per oil power field  Specimen Source Pleural Fl left pleural  Culture-Blood --    11-20 @ 14:50    Organism --   Gram Stain Blood -- Gram Stain --  Specimen Source .Body Fluid Pleural Fluid  Culture-Blood --    11-20 @ 14:49    Organism --   Gram Stain Blood -- Gram Stain   polymorphonuclear leukocytes per low power field  Red blood cells per low power field  No organisms seen per oil power field  Specimen Source .Body Fluid Pleural Fluid  Culture-Blood --    11-19 @ 11:42    Organism --   Gram Stain Blood -- Gram Stain --  Specimen Source .Urine Clean Catch (Midstream)  Culture-Blood --    11-19 @ 11:40    Organism --   Gram Stain Blood -- Gram Stain --  Specimen Source .Blood Blood-Peripheral  Culture-Blood --        Radiology:

## 2017-11-22 ENCOUNTER — TRANSCRIPTION ENCOUNTER (OUTPATIENT)
Age: 82
End: 2017-11-22

## 2017-11-22 VITALS
HEART RATE: 62 BPM | TEMPERATURE: 98 F | RESPIRATION RATE: 18 BRPM | SYSTOLIC BLOOD PRESSURE: 115 MMHG | OXYGEN SATURATION: 96 % | DIASTOLIC BLOOD PRESSURE: 80 MMHG

## 2017-11-22 LAB
ANION GAP SERPL CALC-SCNC: 7 MMOL/L — SIGNIFICANT CHANGE UP (ref 5–17)
BUN SERPL-MCNC: 41 MG/DL — HIGH (ref 7–23)
CALCIUM SERPL-MCNC: 8.6 MG/DL — SIGNIFICANT CHANGE UP (ref 8.4–10.5)
CHLORIDE SERPL-SCNC: 105 MMOL/L — SIGNIFICANT CHANGE UP (ref 96–108)
CO2 SERPL-SCNC: 28 MMOL/L — SIGNIFICANT CHANGE UP (ref 22–31)
CREAT SERPL-MCNC: 1.59 MG/DL — HIGH (ref 0.5–1.3)
GLUCOSE SERPL-MCNC: 97 MG/DL — SIGNIFICANT CHANGE UP (ref 70–99)
HCT VFR BLD CALC: 33.2 % — LOW (ref 34.5–45)
HGB BLD-MCNC: 10.7 G/DL — LOW (ref 11.5–15.5)
MCHC RBC-ENTMCNC: 29.3 PG — SIGNIFICANT CHANGE UP (ref 27–34)
MCHC RBC-ENTMCNC: 32.2 GM/DL — SIGNIFICANT CHANGE UP (ref 32–36)
MCV RBC AUTO: 90.9 FL — SIGNIFICANT CHANGE UP (ref 80–100)
PLATELET # BLD AUTO: 114 K/UL — LOW (ref 150–400)
POTASSIUM SERPL-MCNC: 3.6 MMOL/L — SIGNIFICANT CHANGE UP (ref 3.5–5.3)
POTASSIUM SERPL-SCNC: 3.6 MMOL/L — SIGNIFICANT CHANGE UP (ref 3.5–5.3)
RBC # BLD: 3.65 M/UL — LOW (ref 3.8–5.2)
RBC # FLD: 14.6 % — HIGH (ref 10.3–14.5)
SODIUM SERPL-SCNC: 140 MMOL/L — SIGNIFICANT CHANGE UP (ref 135–145)
WBC # BLD: 8.8 K/UL — SIGNIFICANT CHANGE UP (ref 3.8–10.5)
WBC # FLD AUTO: 8.8 K/UL — SIGNIFICANT CHANGE UP (ref 3.8–10.5)

## 2017-11-22 RX ORDER — FUROSEMIDE 40 MG
1 TABLET ORAL
Qty: 15 | Refills: 0 | OUTPATIENT
Start: 2017-11-22 | End: 2017-12-07

## 2017-11-22 RX ORDER — POTASSIUM CHLORIDE 20 MEQ
15 PACKET (EA) ORAL
Qty: 500 | Refills: 0 | OUTPATIENT
Start: 2017-11-22 | End: 2017-12-07

## 2017-11-22 RX ADMIN — ISOSORBIDE DINITRATE 10 MILLIGRAM(S): 5 TABLET ORAL at 13:45

## 2017-11-22 RX ADMIN — CARVEDILOL PHOSPHATE 3.12 MILLIGRAM(S): 80 CAPSULE, EXTENDED RELEASE ORAL at 11:39

## 2017-11-22 RX ADMIN — BUPROPION HYDROCHLORIDE 150 MILLIGRAM(S): 150 TABLET, EXTENDED RELEASE ORAL at 11:39

## 2017-11-22 RX ADMIN — Medication 1 TABLET(S): at 11:38

## 2017-11-22 RX ADMIN — Medication 40 MILLIGRAM(S): at 06:02

## 2017-11-22 RX ADMIN — CARBIDOPA AND LEVODOPA 1 TABLET(S): 25; 100 TABLET ORAL at 11:39

## 2017-11-22 RX ADMIN — Medication 50 MILLIGRAM(S): at 06:02

## 2017-11-22 RX ADMIN — PANTOPRAZOLE SODIUM 40 MILLIGRAM(S): 20 TABLET, DELAYED RELEASE ORAL at 06:02

## 2017-11-22 RX ADMIN — Medication 50 MILLIGRAM(S): at 13:45

## 2017-11-22 RX ADMIN — CARVEDILOL PHOSPHATE 3.12 MILLIGRAM(S): 80 CAPSULE, EXTENDED RELEASE ORAL at 17:25

## 2017-11-22 RX ADMIN — ISOSORBIDE DINITRATE 10 MILLIGRAM(S): 5 TABLET ORAL at 06:02

## 2017-11-22 RX ADMIN — Medication 100 MICROGRAM(S): at 06:02

## 2017-11-22 NOTE — PROGRESS NOTE ADULT - PROBLEM SELECTOR PLAN 4
HF  diuresis  I and O  BP control  s/p thoracentesis bilateral, transudative eff.
PPI  HOB elevation
monitor bmp

## 2017-11-22 NOTE — DISCHARGE NOTE ADULT - SECONDARY DIAGNOSIS.
Dementia with behavioral disturbance, unspecified dementia type Gastroesophageal reflux disease without esophagitis Hypothyroidism, unspecified type Anemia due to other cause Chronic obstructive pulmonary disease, unspecified COPD type CLL (chronic lymphocytic leukemia)

## 2017-11-22 NOTE — PROGRESS NOTE ADULT - PROVIDER SPECIALTY LIST ADULT
Cardiology
Pulmonology
Pulmonology
Internal Medicine
Cardiology
Pulmonology
Internal Medicine
Internal Medicine

## 2017-11-22 NOTE — PROGRESS NOTE ADULT - RS GEN PE MLT RESP DETAILS PC
airway patent/diminished breath sounds, R/diminished breath sounds, L
diminished breath sounds, R/diminished breath sounds, L/airway patent
diminished breath sounds, L/diminished breath sounds, R/airway patent

## 2017-11-22 NOTE — DISCHARGE NOTE ADULT - MEDICATION SUMMARY - MEDICATIONS TO TAKE
I will START or STAY ON the medications listed below when I get home from the hospital:    isosorbide dinitrate 30 mg oral tablet  -- 1 tab(s) by mouth once a day  -- Indication: For Cad    carbidopa-levodopa 25 mg-100 mg oral tablet  -- 1 tab(s) by mouth once a day  -- Indication: For Parkinson    carvedilol 3.125 mg oral tablet  -- 1 tab(s) by mouth every 12 hours  -- Indication: For Cad    DuoNeb 0.5 mg-2.5 mg/3 mL inhalation solution  -- 3 milliliter(s) inhaled 2 times a day  -- Indication: For COPD (chronic obstructive pulmonary disease)    Symbicort 160 mcg-4.5 mcg/inh inhalation aerosol  -- 2 puff(s) inhaled 2 times a day  -- Indication: For COPD (chronic obstructive pulmonary disease)    bacitracin 500 units/g topical ointment  -- Apply on skin to affected area once a day  -- Indication: For Dermatitis    furosemide 40 mg oral tablet  -- 1 tab(s) by mouth once a day  -- Indication: For Chf    potassium chloride 20 mEq/15 mL oral liquid  -- 15 milliliter(s) by mouth 2 times a day  -- Indication: For Chf    fluticasone 50 mcg/inh nasal spray  -- 1 spray(s) in each nostril once a day  -- Indication: For COPD (chronic obstructive pulmonary disease)    omeprazole 20 mg oral delayed release capsule  -- 1 cap(s) by mouth once a day  -- Indication: For GERD (gastroesophageal reflux disease)    buPROPion 150 mg/24 hours (XL) oral tablet, extended release  -- 1 tab(s) by mouth once a day  -- Indication: For Depression    levothyroxine 100 mcg (0.1 mg) oral tablet  -- 1 tab(s) by mouth once a day  -- Indication: For hypothyroid    hydrALAZINE 50 mg oral tablet  -- 1 tab(s) by mouth every 8 hours  -- Indication: For htn    Calcium 600+D 600 mg-200 intl units oral tablet  -- 1 tab(s) by mouth once a day  -- Indication: For suppl    Multiple Vitamins oral tablet  -- 1 tab(s) by mouth once a day  -- Indication: For suppl

## 2017-11-22 NOTE — PROGRESS NOTE ADULT - ATTENDING COMMENTS
I have discussed care plan with patient and HCP,expressed understanding of problems treatment and their effect and side effects, alternatives in detail,I have asked if they have any questions and concerns and appropriately addressed them to best of my ability  Reviewed all diagonostic tests, lab results and drug drug interactions, and medications  45 minutes spent on this visit, 50% visit time spent in care co-ordination with other attendings and counselling patient  d/w son madelyn cruz
I have discussed care plan with patient and HCP,expressed understanding of problems treatment and their effect and side effects, alternatives in detail,I have asked if they have any questions and concerns and appropriately addressed them to best of my ability  Reviewed all diagonostic tests, lab results and drug drug interactions, and medications  45 minutes spent on this visit, 50% visit time spent in care co-ordination with other attendings and counselling patient  d/w son madelyn cruz
I have discussed care plan with patient and HCP,expressed understanding of problems treatment and their effect and side effects, alternatives in detail,I have asked if they have any questions and concerns and appropriately addressed them to best of my ability  Reviewed all diagonostic tests, lab results and drug drug interactions, and medications  45 minutes spent on this visit, 50% visit time spent in care co-ordination with other attendings and counselling patient

## 2017-11-22 NOTE — PROGRESS NOTE ADULT - PROBLEM SELECTOR PLAN 2
Continue medication.
nebs prn  o2 support  keep sat > 88 pct  chest pt  suction PRN  oral care  HOB elevation  seasonal vaccination
supportive care
PPI  HOB elevation
supportive care
supportive care

## 2017-11-22 NOTE — DISCHARGE NOTE ADULT - CARE PLAN
Principal Discharge DX:	Acute on chronic combined systolic and diastolic congestive heart failure  Goal:	improved  Instructions for follow-up, activity and diet:	lasix and potassium, f up with cardiologist  Secondary Diagnosis:	Anemia due to other cause  Instructions for follow-up, activity and diet:	monitor and f up with pcp  Secondary Diagnosis:	Chronic obstructive pulmonary disease, unspecified COPD type  Instructions for follow-up, activity and diet:	cont current tt nebs  Secondary Diagnosis:	CLL (chronic lymphocytic leukemia)  Instructions for follow-up, activity and diet:	supportive care  Secondary Diagnosis:	Dementia with behavioral disturbance, unspecified dementia type  Instructions for follow-up, activity and diet:	supportive care  Secondary Diagnosis:	Gastroesophageal reflux disease without esophagitis  Instructions for follow-up, activity and diet:	pantoprazole  Secondary Diagnosis:	Hypothyroidism, unspecified type  Instructions for follow-up, activity and diet:	levothyroxin

## 2017-11-22 NOTE — PROGRESS NOTE ADULT - PROBLEM SELECTOR PLAN 3
nebs  o2 support  chest pt  mobilize  seasonal vaccination
asp prec  dysph diet
o2, keep o2 sat >88, pulmonary consult

## 2017-11-22 NOTE — DISCHARGE NOTE ADULT - PATIENT PORTAL LINK FT
“You can access the [facility] Patient Portal, offered by [facility], by registering with the following website: [email or web address]”

## 2017-11-22 NOTE — PROGRESS NOTE ADULT - SUBJECTIVE AND OBJECTIVE BOX
Patient is a 89y Female with a known history of :  Transudative pleural effusion (J94.8)  GERD (gastroesophageal reflux disease) (K21.9)  Pleural effusion (J90)  Anemia due to other cause (D64.89)  Leukocytosis, unspecified type (D72.829)  Dysphagia, unspecified type (R13.10)  Gastroesophageal reflux disease without esophagitis (K21.9)  CKD (chronic kidney disease) stage 3, GFR 30-59 ml/min (N18.3)  Chronic obstructive pulmonary disease, unspecified COPD type (J44.9)  Dementia with behavioral disturbance, unspecified dementia type (F03.91)  Acute on chronic combined systolic and diastolic congestive heart failure (I50.43)  CLL (chronic lymphocytic leukemia) (C91.10)  COPD (chronic obstructive pulmonary disease) (J44.9)    HPI:  89 yr old with PMH of Dementia,depression,severe aortic stenosis, chf, CVA- haemorruagic stroke in 2005,severe GI bleed 4 yrs back,pleural effusion, gait disbility following CVA,possible parkinsonafter CVa,hypothyroid, lactose intolerant,allergic to penicillin and contrast dye,recently treated for similar complaint with chf copd renal failure , sent back for SOB, IN ED found to have b/l pleural effusion worsened , and is being admitted for further care, has  leucocytosis, no fever , no chills.no nauasea no vomiting, on pureed diet (19 Nov 2017 09:29)          MEDICATIONS  (STANDING):  buPROPion XL . 150 milliGRAM(s) Oral daily  carbidopa/levodopa  25/100 1 Tablet(s) Oral daily  carvedilol 3.125 milliGRAM(s) Oral every 12 hours  furosemide    Tablet 40 milliGRAM(s) Oral daily  hydrALAZINE 50 milliGRAM(s) Oral every 8 hours  isosorbide   dinitrate Tablet (ISORDIL) 10 milliGRAM(s) Oral three times a day  levothyroxine 100 MICROGram(s) Oral daily  multivitamin 1 Tablet(s) Oral daily  pantoprazole    Tablet 40 milliGRAM(s) Oral before breakfast    MEDICATIONS  (PRN):  acetaminophen   Tablet 650 milliGRAM(s) Oral every 6 hours PRN For Temp greater than 38 C (100.4 F)  acetaminophen   Tablet. 650 milliGRAM(s) Oral every 6 hours PRN Mild Pain (1 - 3)  ALBUTerol    0.083% 2.5 milliGRAM(s) Nebulizer every 6 hours PRN Shortness of Breath and/or Wheezing      ALLERGIES: IV Contrast (Unknown)  penicillin (Unknown)      FAMILY HISTORY:      Social history:  Alochol:   Smoking:   Drug Use:   Marital Status:     PHYSICAL EXAMINATION:  -----------------------------  T(C): 36.7 (11-22-17 @ 05:41), Max: 36.8 (11-21-17 @ 17:25)  HR: 58 (11-22-17 @ 05:41) (55 - 66)  BP: 156/65 (11-22-17 @ 05:41) (103/60 - 156/65)  RR: 18 (11-22-17 @ 05:41) (18 - 18)  SpO2: 97% (11-22-17 @ 05:41) (94% - 98%)  Wt(kg): --    11-21 @ 07:01  -  11-22 @ 07:00  --------------------------------------------------------  IN:    Oral Fluid: 250 mL  Total IN: 250 mL    OUT:  Total OUT: 0 mL    Total NET: 250 mL            Constitutional: well developed, normal appearance, well groomed, well nourished, no deformities and no acute distress.   Eyes: the conjunctiva exhibited no abnormalities and the eyelids demonstrated no xanthelasmas.   HEENT: normal oral mucosa, no oral pallor and no oral cyanosis.   Neck: normal jugular venous A waves present, normal jugular venous V waves present and no jugular venous leong A waves.   Pulmonary: no respiratory distress, normal respiratory rhythm and effort, no accessory muscle use and lungs were clear to auscultation bilaterally.   Cardiovascular: heart rate and rhythm were normal, normal S1 and S2 and no murmur, gallop, rub, heave or thrill are present.   Abdomen: soft, non-tender, no hepato-splenomegaly and no abdominal mass palpated.   Musculoskeletal: the gait could not be assessed..   Extremities: no clubbing of the fingernails, no localized cyanosis, no petechial hemorrhages and no ischemic changes.   Skin: normal skin color and pigmentation, no rash, no venous stasis, no skin lesions, no skin ulcer and no xanthoma was observed.   Psychiatric: oriented to person, place, and time, the affect was normal, the mood was normal and not feeling anxious.     LABS:   --------  CBC Full  -  ( 20 Nov 2017 08:02 )  WBC Count : 10.5 K/uL  Hemoglobin : 11.5 g/dL  Hematocrit : 34.9 %  Platelet Count - Automated : 117 K/uL  Mean Cell Volume : 91.4 fl  Mean Cell Hemoglobin : 30.2 pg  Mean Cell Hemoglobin Concentration : 33.0 gm/dL  Auto Neutrophil # : x  Auto Lymphocyte # : x  Auto Monocyte # : x  Auto Eosinophil # : x  Auto Basophil # : x  Auto Neutrophil % : x  Auto Lymphocyte % : x  Auto Monocyte % : x  Auto Eosinophil % : x  Auto Basophil % : x      11-20    144  |  107  |  34<H>  ----------------------------<  85  3.6   |  28  |  1.60<H>    Ca    8.7      20 Nov 2017 08:02                 Culture Results:   No growth (11-20 @ 14:56)  Culture Results:   Testing in progress (11-20 @ 14:56)  Culture Results:   Testing in progress (11-20 @ 14:50)    11-21 @ 07:52    Organism --   Gram Stain Blood -- Gram Stain --  Specimen Source .Body Fluid pleural fl  Culture-Blood --    11-20 @ 14:56    Organism --   Gram Stain Blood -- Gram Stain   polymorphonuclear leukocytes per low power field  Red blood cells per low power field  No organisms seen per oil power field  Specimen Source Pleural Fl left pleural  Culture-Blood --    11-20 @ 14:50    Organism --   Gram Stain Blood -- Gram Stain --  Specimen Source .Body Fluid Pleural Fluid  Culture-Blood --    11-20 @ 14:49    Organism --   Gram Stain Blood -- Gram Stain   polymorphonuclear leukocytes per low power field  Red blood cells per low power field  No organisms seen per oil power field  Specimen Source .Body Fluid Pleural Fluid  Culture-Blood --    11/22/2017:  On tele = NSR  Says she is breathing better    Radiology: Patient is a 89y Female with a known history of :  Transudative pleural effusion (J94.8)  GERD (gastroesophageal reflux disease) (K21.9)  Pleural effusion (J90)  Anemia due to other cause (D64.89)  Leukocytosis, unspecified type (D72.829)  Dysphagia, unspecified type (R13.10)  Gastroesophageal reflux disease without esophagitis (K21.9)  CKD (chronic kidney disease) stage 3, GFR 30-59 ml/min (N18.3)  Chronic obstructive pulmonary disease, unspecified COPD type (J44.9)  Dementia with behavioral disturbance, unspecified dementia type (F03.91)  Acute on chronic combined systolic and diastolic congestive heart failure (I50.43)  CLL (chronic lymphocytic leukemia) (C91.10)  COPD (chronic obstructive pulmonary disease) (J44.9)    HPI:  89 yr old with PMH of Dementia,depression,severe aortic stenosis, chf, CVA- haemorruagic stroke in 2005,severe GI bleed 4 yrs back,pleural effusion, gait disbility following CVA,possible parkinsonafter CVa,hypothyroid, lactose intolerant,allergic to penicillin and contrast dye,recently treated for similar complaint with chf copd renal failure , sent back for SOB, IN ED found to have b/l pleural effusion worsened , and is being admitted for further care, has  leucocytosis, no fever , no chills.no nauasea no vomiting, on pureed diet (19 Nov 2017 09:29)          MEDICATIONS  (STANDING):  buPROPion XL . 150 milliGRAM(s) Oral daily  carbidopa/levodopa  25/100 1 Tablet(s) Oral daily  carvedilol 3.125 milliGRAM(s) Oral every 12 hours  furosemide    Tablet 40 milliGRAM(s) Oral daily  hydrALAZINE 50 milliGRAM(s) Oral every 8 hours  isosorbide   dinitrate Tablet (ISORDIL) 10 milliGRAM(s) Oral three times a day  levothyroxine 100 MICROGram(s) Oral daily  multivitamin 1 Tablet(s) Oral daily  pantoprazole    Tablet 40 milliGRAM(s) Oral before breakfast    MEDICATIONS  (PRN):  acetaminophen   Tablet 650 milliGRAM(s) Oral every 6 hours PRN For Temp greater than 38 C (100.4 F)  acetaminophen   Tablet. 650 milliGRAM(s) Oral every 6 hours PRN Mild Pain (1 - 3)  ALBUTerol    0.083% 2.5 milliGRAM(s) Nebulizer every 6 hours PRN Shortness of Breath and/or Wheezing      ALLERGIES: IV Contrast (Unknown)  penicillin (Unknown)      FAMILY HISTORY:      Social history:  Alochol:   Smoking:   Drug Use:   Marital Status:     PHYSICAL EXAMINATION:  -----------------------------  T(C): 36.7 (11-22-17 @ 05:41), Max: 36.8 (11-21-17 @ 17:25)  HR: 58 (11-22-17 @ 05:41) (55 - 66)  BP: 156/65 (11-22-17 @ 05:41) (103/60 - 156/65)  RR: 18 (11-22-17 @ 05:41) (18 - 18)  SpO2: 97% (11-22-17 @ 05:41) (94% - 98%)  Wt(kg): --    11-21 @ 07:01  -  11-22 @ 07:00  --------------------------------------------------------  IN:    Oral Fluid: 250 mL  Total IN: 250 mL    OUT:  Total OUT: 0 mL    Total NET: 250 mL            Constitutional: frail, but in no apparent distress  Eyes: the conjunctiva exhibited no abnormalities and the eyelids demonstrated no xanthelasmas.   HEENT: normal oral mucosa, no oral pallor and no oral cyanosis.   Neck: normal jugular venous A waves present, normal jugular venous V waves present and no jugular venous leong A waves.   Pulmonary: no respiratory distress, normal respiratory rhythm and effort, no accessory muscle use and lungs were clear to auscultation bilaterally.  Poor inspiratory effort  Cardiovascular: heart rate and rhythm were normal, normal S1 and S2 and no murmur, gallop, rub, heave or thrill are present.   Musculoskeletal: the gait could not be assessed..   Extremities: no clubbing of the fingernails, no localized cyanosis, no petechial hemorrhages and no ischemic changes.   Skin: normal skin color and pigmentation, no rash, no venous stasis, no skin lesions, no skin ulcer and no xanthoma was observed.   Psychiatric: oriented to person, place, and time, the affect was normal, the mood was normal and not feeling anxious.     LABS:   --------  CBC Full  -  ( 20 Nov 2017 08:02 )  WBC Count : 10.5 K/uL  Hemoglobin : 11.5 g/dL  Hematocrit : 34.9 %  Platelet Count - Automated : 117 K/uL  Mean Cell Volume : 91.4 fl  Mean Cell Hemoglobin : 30.2 pg  Mean Cell Hemoglobin Concentration : 33.0 gm/dL  Auto Neutrophil # : x  Auto Lymphocyte # : x  Auto Monocyte # : x  Auto Eosinophil # : x  Auto Basophil # : x  Auto Neutrophil % : x  Auto Lymphocyte % : x  Auto Monocyte % : x  Auto Eosinophil % : x  Auto Basophil % : x      11-20    144  |  107  |  34<H>  ----------------------------<  85  3.6   |  28  |  1.60<H>    Ca    8.7      20 Nov 2017 08:02                 Culture Results:   No growth (11-20 @ 14:56)  Culture Results:   Testing in progress (11-20 @ 14:56)  Culture Results:   Testing in progress (11-20 @ 14:50)    11-21 @ 07:52    Organism --   Gram Stain Blood -- Gram Stain --  Specimen Source .Body Fluid pleural fl  Culture-Blood --    11-20 @ 14:56    Organism --   Gram Stain Blood -- Gram Stain   polymorphonuclear leukocytes per low power field  Red blood cells per low power field  No organisms seen per oil power field  Specimen Source Pleural Fl left pleural  Culture-Blood --    11-20 @ 14:50    Organism --   Gram Stain Blood -- Gram Stain --  Specimen Source .Body Fluid Pleural Fluid  Culture-Blood --    11-20 @ 14:49    Organism --   Gram Stain Blood -- Gram Stain   polymorphonuclear leukocytes per low power field  Red blood cells per low power field  No organisms seen per oil power field  Specimen Source .Body Fluid Pleural Fluid  Culture-Blood --    11/22/2017:  On tele = NSR  Says she is breathing better    Radiology:

## 2017-11-22 NOTE — PROGRESS NOTE ADULT - PROBLEM SELECTOR PLAN 1
Improved.  No objection to out patient follow up
HF  Diuresis  monitor sat  keep sat > 88 pct  oral and skin care  HOB elevation, asp prec, oral hygiene  cvs regimen  out of bed with assist  overall better  improvement of resp status after thoracentesis, at risk for recurrence of pleural eff.
bilateral effusions  for thoracentesis  discussed with SON, who is a podiatrist
diuresis, cardiac monitoring, cardio consult, cont cardiac meds
s/p bilateral thoracentesis  transudative eff  diuresis for management of recurrent eff  likely to have recurrence
diuresis, cardiac monitoring, cardio consult, cont cardiac meds
diuresis, cardiac monitoring, cardio consult, cont cardiac meds

## 2017-11-22 NOTE — PROGRESS NOTE ADULT - SUBJECTIVE AND OBJECTIVE BOX
Patient is a 89y old  Female who presents with a chief complaint of SOB (19 Nov 2017 09:29)      INTERVAL HPI/OVERNIGHT EVENTS:    Home Medications:  bacitracin 500 units/g topical ointment: Apply topically to affected area once a day (19 Nov 2017 04:37)  buPROPion 150 mg/24 hours (XL) oral tablet, extended release: 1 tab(s) orally once a day (19 Nov 2017 04:37)  Calcium 600+D 600 mg-200 intl units oral tablet: 1 tab(s) orally once a day (19 Nov 2017 04:37)  carbidopa-levodopa 25 mg-100 mg oral tablet: 1 tab(s) orally once a day (19 Nov 2017 04:37)  carvedilol 3.125 mg oral tablet: 1 tab(s) orally every 12 hours (19 Nov 2017 04:37)  DuoNeb 0.5 mg-2.5 mg/3 mL inhalation solution: 3 milliliter(s) inhaled 2 times a day (19 Nov 2017 04:37)  fluticasone 50 mcg/inh nasal spray: 1 spray(s) in each nostril once a day (19 Nov 2017 04:37)  isosorbide dinitrate 30 mg oral tablet: 1 tab(s) orally once a day (19 Nov 2017 04:37)  Multiple Vitamins oral tablet: 1 tab(s) orally once a day (19 Nov 2017 04:37)  omeprazole 20 mg oral delayed release capsule: 1 cap(s) orally once a day (19 Nov 2017 04:37)  Symbicort 160 mcg-4.5 mcg/inh inhalation aerosol: 2 puff(s) inhaled 2 times a day (19 Nov 2017 04:37)      MEDICATIONS  (STANDING):  buPROPion XL . 150 milliGRAM(s) Oral daily  carbidopa/levodopa  25/100 1 Tablet(s) Oral daily  carvedilol 3.125 milliGRAM(s) Oral every 12 hours  furosemide    Tablet 40 milliGRAM(s) Oral daily  hydrALAZINE 50 milliGRAM(s) Oral every 8 hours  isosorbide   dinitrate Tablet (ISORDIL) 10 milliGRAM(s) Oral three times a day  levothyroxine 100 MICROGram(s) Oral daily  multivitamin 1 Tablet(s) Oral daily  pantoprazole    Tablet 40 milliGRAM(s) Oral before breakfast    MEDICATIONS  (PRN):  acetaminophen   Tablet 650 milliGRAM(s) Oral every 6 hours PRN For Temp greater than 38 C (100.4 F)  acetaminophen   Tablet. 650 milliGRAM(s) Oral every 6 hours PRN Mild Pain (1 - 3)  ALBUTerol    0.083% 2.5 milliGRAM(s) Nebulizer every 6 hours PRN Shortness of Breath and/or Wheezing      Allergies    IV Contrast (Unknown)  penicillin (Unknown)    Intolerances          Vital Signs Last 24 Hrs  T(C): 36.5 (22 Nov 2017 09:30), Max: 36.8 (21 Nov 2017 17:25)  T(F): 97.7 (22 Nov 2017 09:30), Max: 98.2 (21 Nov 2017 17:25)  HR: 61 (22 Nov 2017 09:30) (58 - 66)  BP: 115/48 (22 Nov 2017 09:30) (103/60 - 156/65)  BP(mean): --  RR: 17 (22 Nov 2017 09:30) (17 - 18)  SpO2: 98% (22 Nov 2017 09:30) (94% - 98%)    LABS:                        10.7   8.8   )-----------( 114      ( 22 Nov 2017 07:41 )             33.2     11-22    140  |  105  |  41<H>  ----------------------------<  97  3.6   |  28  |  1.59<H>    Ca    8.6      22 Nov 2017 07:41          CAPILLARY BLOOD GLUCOSE              11-21 @ 07:01  -  11-22 @ 07:00  --------------------------------------------------------  IN: 250 mL / OUT: 0 mL / NET: 250 mL          Culture - Acid Fast - Body Fluid w/Smear (collected 11-21-17 @ 07:52)  Source: .Body Fluid pleural fl    Culture - Fungal, Body Fluid (collected 11-20-17 @ 14:56)  Source: Pleural Fl left pleural  Preliminary Report (11-21-17 @ 08:45):    Testing in progress    Culture - Acid Fast - Body Fluid w/Smear (collected 11-20-17 @ 14:56)  Source: Pleural Fl left pleural    Culture - Body Fluid with Gram Stain (collected 11-20-17 @ 14:56)  Source: Pleural Fl left pleural  Gram Stain (11-20-17 @ 15:38):    polymorphonuclear leukocytes per low power field    Red blood cells per low power field    No organisms seen per oil power field  Preliminary Report (11-21-17 @ 08:15):    No growth    Culture - Fungal, Body Fluid (collected 11-20-17 @ 14:50)  Source: .Body Fluid Pleural Fluid  Preliminary Report (11-21-17 @ 08:45):    Testing in progress    Culture - Acid Fast - Body Fluid w/Smear (collected 11-20-17 @ 14:50)  Source: .Body Fluid Pleural Fluid    Culture - Body Fluid with Gram Stain (collected 11-20-17 @ 14:49)  Source: .Body Fluid Pleural Fluid  Gram Stain (11-20-17 @ 15:38):    polymorphonuclear leukocytes per low power field    Red blood cells per low power field    No organisms seen per oil power field  Preliminary Report (11-21-17 @ 08:15):    No growth    Culture - Urine (collected 11-19-17 @ 11:42)  Source: .Urine Clean Catch (Midstream)  Final Report (11-20-17 @ 12:33):    Culture grew 3 or more types of organisms which indicate    collection contamination; consider recollection only if clinically    indicated.    Culture - Blood (collected 11-19-17 @ 11:40)  Source: .Blood Blood-Peripheral  Preliminary Report (11-20-17 @ 12:04):    No growth to date.    Culture - Blood (collected 11-19-17 @ 11:40)  Source: .Blood Blood-Peripheral  Preliminary Report (11-20-17 @ 12:04):    No growth to date.          RADIOLOGY & ADDITIONAL TESTS:    Imaging Personally Reviewed:  [ x] YES  [ ] NO    Consultant(s) Notes Reviewed:  [x ] YES  [ ] NO    Care Discussed with Consultants/Other Providers [ ] YES  [ ] NO

## 2017-11-22 NOTE — PROGRESS NOTE ADULT - PROBLEM SELECTOR PROBLEM 4
Acute on chronic combined systolic and diastolic congestive heart failure
GERD (gastroesophageal reflux disease)
CKD (chronic kidney disease) stage 3, GFR 30-59 ml/min

## 2017-11-22 NOTE — PROGRESS NOTE ADULT - PROBLEM SELECTOR PROBLEM 1
Pleural effusion
Transudative pleural effusion
Acute on chronic combined systolic and diastolic congestive heart failure
Transudative pleural effusion
Chronic obstructive pulmonary disease, unspecified COPD type
Acute on chronic combined systolic and diastolic congestive heart failure
Acute on chronic combined systolic and diastolic congestive heart failure

## 2017-11-22 NOTE — DISCHARGE NOTE ADULT - CARE PROVIDER_API CALL
Bev Almonte (MD), Internal Medicine; Nephrology  [address] [facility]  [address] [place] [address]  Phone: [phone]  Fax: [phone]

## 2017-11-22 NOTE — PROGRESS NOTE ADULT - SUBJECTIVE AND OBJECTIVE BOX
Date/Time Patient Seen:  		  Referring MD:   Data Reviewed	       Patient is a 89y old  Female who presents with a chief complaint of SOB (19 Nov 2017 09:29)  in bed  seen and examined  on room air  vs and meds reviewed        Subjective/HPI     PAST MEDICAL & SURGICAL HISTORY:  CLL (chronic lymphocytic leukemia)  Dementia with behavioral disturbance, unspecified dementia type  Gastrointestinal hemorrhage, unspecified gastrointestinal hemorrhage type  Nontraumatic intracerebral hemorrhage, unspecified cerebral location, unspecified laterality  Rhinitis  Anxiety  GERD (gastroesophageal reflux disease)  Hypothyroidism  Coronary artery disease  COPD (chronic obstructive pulmonary disease)  Parkinson's disease  Closed fracture of neck of left femur, initial encounter        Medication list         MEDICATIONS  (STANDING):  buPROPion XL . 150 milliGRAM(s) Oral daily  carbidopa/levodopa  25/100 1 Tablet(s) Oral daily  carvedilol 3.125 milliGRAM(s) Oral every 12 hours  furosemide    Tablet 40 milliGRAM(s) Oral daily  hydrALAZINE 50 milliGRAM(s) Oral every 8 hours  isosorbide   dinitrate Tablet (ISORDIL) 10 milliGRAM(s) Oral three times a day  levothyroxine 100 MICROGram(s) Oral daily  multivitamin 1 Tablet(s) Oral daily  pantoprazole    Tablet 40 milliGRAM(s) Oral before breakfast    MEDICATIONS  (PRN):  acetaminophen   Tablet 650 milliGRAM(s) Oral every 6 hours PRN For Temp greater than 38 C (100.4 F)  acetaminophen   Tablet. 650 milliGRAM(s) Oral every 6 hours PRN Mild Pain (1 - 3)  ALBUTerol    0.083% 2.5 milliGRAM(s) Nebulizer every 6 hours PRN Shortness of Breath and/or Wheezing         Vitals log        ICU Vital Signs Last 24 Hrs  T(C): 36.7 (22 Nov 2017 05:41), Max: 36.8 (21 Nov 2017 17:25)  T(F): 98 (22 Nov 2017 05:41), Max: 98.2 (21 Nov 2017 17:25)  HR: 58 (22 Nov 2017 05:41) (55 - 66)  BP: 156/65 (22 Nov 2017 05:41) (103/60 - 156/65)  BP(mean): --  ABP: --  ABP(mean): --  RR: 18 (22 Nov 2017 05:41) (18 - 18)  SpO2: 97% (22 Nov 2017 05:41) (94% - 98%)           Input and Output:  I&O's Detail    20 Nov 2017 07:01  -  21 Nov 2017 07:00  --------------------------------------------------------  IN:    Oral Fluid: 490 mL  Total IN: 490 mL    OUT:  Total OUT: 0 mL    Total NET: 490 mL      21 Nov 2017 07:01  -  22 Nov 2017 06:56  --------------------------------------------------------  IN:    Oral Fluid: 250 mL  Total IN: 250 mL    OUT:  Total OUT: 0 mL    Total NET: 250 mL          Lab Data                        11.5   10.5  )-----------( 117      ( 20 Nov 2017 08:02 )             34.9     11-20    144  |  107  |  34<H>  ----------------------------<  85  3.6   |  28  |  1.60<H>    Ca    8.7      20 Nov 2017 08:02              Review of Systems	      Objective     Physical Examination  head at  heart - s1s2  lungs - dec   abd - sft        Pertinent Lab findings & Imaging      Desire:  NO   Adequate UO     I&O's Detail    20 Nov 2017 07:01  -  21 Nov 2017 07:00  --------------------------------------------------------  IN:    Oral Fluid: 490 mL  Total IN: 490 mL    OUT:  Total OUT: 0 mL    Total NET: 490 mL      21 Nov 2017 07:01  -  22 Nov 2017 06:56  --------------------------------------------------------  IN:    Oral Fluid: 250 mL  Total IN: 250 mL    OUT:  Total OUT: 0 mL    Total NET: 250 mL               Discussed with:     Cultures:	        Radiology

## 2017-11-22 NOTE — DISCHARGE NOTE ADULT - HOSPITAL COURSE
89 yr old with PMH of Dementia,depression,severe aortic stenosis, chf, CVA- haemorruagic stroke in 2005,severe GI bleed 4 yrs back,pleural effusion, gait disbility following CVA,possible parkinson after CVa,hypothyroid, lactose intolerant,allergic to penicillin and contrast dye,recently treated for similar complaint with chf copd renal failure , sent back for SOB, IN ED found to have b/l pleural effusion worsened , and is being admitted for further care, has  leucocytosis, no fever , no chills.no nauasea no vomiting, on pureed diet  admitting with mild respiratory failure with possible CHF systolic  diastolic with copd with leucocytosis, asymptomatic bacteruria, with h/o ESBL e coli colonization, with valvular heart disease aortic valve disease fraility severe aortic incompetence with CKD 3., GERD. b/l pleural effusion  patient had thoracentesis on 11/20, and it is transudative ,culture results pending to befollowed, diuresis to continue.  COPD stable, hypothyroidism, parkinson stable, PT for gait instability  dc to assisted living

## 2017-11-22 NOTE — PROGRESS NOTE ADULT - PROBLEM SELECTOR PLAN 6
mechanically soft with nectar thick

## 2017-11-22 NOTE — PROGRESS NOTE ADULT - ASSESSMENT
89 yr old with PMH of Dementia,depression,severe aortic stenosis, chf, CVA- haemorruagic stroke in 2005,severe GI bleed 4 yrs back,pleural effusion, gait disbility following CVA,possible parkinsonafter CVa,hypothyroid, lactose intolerant,allergic to penicillin and contrast dye,recently treated for similar complaint with chf copd renal failure , sent back for SOB, IN ED found to have b/l pleural effusion worsened , and is being admitted for further care, has  leucocytosis, no fever , no chills.no nauasea no vomiting, on pureed diet  admitting with mild respiratory failure with possible CHF diastolic with copd with leucocytosis, asymptomatic bacteruria, with h/o ESBL e coli colonization, with valvular heart disease aortic valve disease fraility severe aortic incompetence with CKD 3., GERD. b/l pleural effusion  patient had thoracentesis on 11/20, and it is transudative , will continue diuresis, titrate o2 down, ambulate observe ,   pleural fluid cultures pending.
89y/o w/f seen at [facility] ER.  History Dementia, AI, pleural effusion, CLL, Parkinson's disease, CVA, CAD, CRI, COPD, thyroid disease, CHF, s/p GI bleed  Recently in hospital for shortness of breath.    Admitted for shortness of breath.  Given IV fluids, IV lasix and SL NTG.  "The breathing is better."    11/21/17  Patient lying flat, comfortably.  Alert and awake.  S/P thorancentesis.    Impression:  Acute on chronic heart failure.  Known AI with normal EF.  R/O pulmonary pathology.    Plan:  - Continue prior medications.  - Change Lasix to PO.  - Follow labs.  - Per Pulmonary.
89 year  old resident of [facility], history of COPD, Aortic insufficiency, dementia, admitted for SOB, improved.
89 yr old with PMH of Dementia,depression,severe aortic stenosis, chf, CVA- haemorruagic stroke in 2005,severe GI bleed 4 yrs back,pleural effusion, gait disbility following CVA,possible parkinsonafter CVa,hypothyroid, lactose intolerant,allergic to penicillin and contrast dye,recently treated for similar complaint with chf copd renal failure , sent back for SOB, IN ED found to have b/l pleural effusion worsened , and is being admitted for further care, has  leucocytosis, no fever , no chills.no nauasea no vomiting, on pureed diet  admitting with mild respiratory failure with possible CHF diastolic with copd with leucocytosis, asymptomatic bacteruria, with h/o ESBL e coli colonization, with valvular heart disease aortic valve disease fraility severe aortic incompetence with CKD 3., GERD. b/l pleural effusion  patient for thoracentesis today
89 yr old with PMH of Dementia,depression,severe aortic stenosis, chf, CVA- haemorruagic stroke in 2005,severe GI bleed 4 yrs back,pleural effusion, gait disbility following CVA,possible parkinsonafter CVa,hypothyroid, lactose intolerant,allergic to penicillin and contrast dye,recently treated for similar complaint with chf copd renal failure , sent back for SOB, IN ED found to have b/l pleural effusion worsened , and is being admitted for further care, has  leucocytosis, no fever , no chills.no nauasea no vomiting, on pureed diet  admitting with mild respiratory failure with possible CHF diastolic with copd with leucocytosis, asymptomatic bacteruria, with h/o ESBL e coli colonization, with valvular heart disease aortic valve disease fraility severe aortic incompetence with CKD 3., GERD. b/l pleural effusion  patient had thoracentesis on 11/20, and it is transudative , will continue diuresis, titrate o2 down, ambulate observe , for dc plan to assisted living in AM

## 2017-11-22 NOTE — PROGRESS NOTE ADULT - PROBLEM SELECTOR PROBLEM 3
Dysphagia, unspecified type
Chronic obstructive pulmonary disease, unspecified COPD type

## 2017-12-18 ENCOUNTER — EMERGENCY (EMERGENCY)
Facility: HOSPITAL | Age: 82
LOS: 1 days | Discharge: NURSING, REHAB OR LONG-TERM CARE | End: 2017-12-18
Attending: EMERGENCY MEDICINE | Admitting: EMERGENCY MEDICINE
Payer: MEDICARE

## 2017-12-18 VITALS
HEART RATE: 63 BPM | DIASTOLIC BLOOD PRESSURE: 63 MMHG | RESPIRATION RATE: 14 BRPM | OXYGEN SATURATION: 93 % | HEIGHT: 64 IN | WEIGHT: 105.82 LBS | SYSTOLIC BLOOD PRESSURE: 137 MMHG | TEMPERATURE: 98 F

## 2017-12-18 DIAGNOSIS — S72.002A FRACTURE OF UNSPECIFIED PART OF NECK OF LEFT FEMUR, INITIAL ENCOUNTER FOR CLOSED FRACTURE: Chronic | ICD-10-CM

## 2017-12-18 PROCEDURE — 99284 EMERGENCY DEPT VISIT MOD MDM: CPT

## 2017-12-19 ENCOUNTER — EMERGENCY (EMERGENCY)
Facility: HOSPITAL | Age: 82
LOS: 1 days | Discharge: NURSING, REHAB OR LONG-TERM CARE | End: 2017-12-19
Attending: EMERGENCY MEDICINE | Admitting: EMERGENCY MEDICINE
Payer: MEDICARE

## 2017-12-19 VITALS
TEMPERATURE: 97 F | DIASTOLIC BLOOD PRESSURE: 71 MMHG | HEART RATE: 67 BPM | RESPIRATION RATE: 16 BRPM | OXYGEN SATURATION: 93 % | SYSTOLIC BLOOD PRESSURE: 170 MMHG

## 2017-12-19 VITALS
HEART RATE: 65 BPM | DIASTOLIC BLOOD PRESSURE: 56 MMHG | RESPIRATION RATE: 16 BRPM | OXYGEN SATURATION: 95 % | WEIGHT: 95.9 LBS | HEIGHT: 60 IN | SYSTOLIC BLOOD PRESSURE: 101 MMHG

## 2017-12-19 VITALS
SYSTOLIC BLOOD PRESSURE: 146 MMHG | RESPIRATION RATE: 14 BRPM | HEART RATE: 64 BPM | TEMPERATURE: 98 F | DIASTOLIC BLOOD PRESSURE: 72 MMHG | OXYGEN SATURATION: 96 %

## 2017-12-19 DIAGNOSIS — S72.002A FRACTURE OF UNSPECIFIED PART OF NECK OF LEFT FEMUR, INITIAL ENCOUNTER FOR CLOSED FRACTURE: Chronic | ICD-10-CM

## 2017-12-19 PROCEDURE — 80048 BASIC METABOLIC PNL TOTAL CA: CPT

## 2017-12-19 PROCEDURE — 87040 BLOOD CULTURE FOR BACTERIA: CPT

## 2017-12-19 PROCEDURE — 97161 PT EVAL LOW COMPLEX 20 MIN: CPT

## 2017-12-19 PROCEDURE — 73590 X-RAY EXAM OF LOWER LEG: CPT

## 2017-12-19 PROCEDURE — 85730 THROMBOPLASTIN TIME PARTIAL: CPT

## 2017-12-19 PROCEDURE — 99285 EMERGENCY DEPT VISIT HI MDM: CPT

## 2017-12-19 PROCEDURE — 97116 GAIT TRAINING THERAPY: CPT

## 2017-12-19 PROCEDURE — 73610 X-RAY EXAM OF ANKLE: CPT

## 2017-12-19 PROCEDURE — 83605 ASSAY OF LACTIC ACID: CPT

## 2017-12-19 PROCEDURE — 83880 ASSAY OF NATRIURETIC PEPTIDE: CPT

## 2017-12-19 PROCEDURE — 96374 THER/PROPH/DIAG INJ IV PUSH: CPT

## 2017-12-19 PROCEDURE — 85610 PROTHROMBIN TIME: CPT

## 2017-12-19 PROCEDURE — 73564 X-RAY EXAM KNEE 4 OR MORE: CPT

## 2017-12-19 PROCEDURE — 87116 MYCOBACTERIA CULTURE: CPT

## 2017-12-19 PROCEDURE — 84484 ASSAY OF TROPONIN QUANT: CPT

## 2017-12-19 PROCEDURE — 83986 ASSAY PH BODY FLUID NOS: CPT

## 2017-12-19 PROCEDURE — 84145 PROCALCITONIN (PCT): CPT

## 2017-12-19 PROCEDURE — 87206 SMEAR FLUORESCENT/ACID STAI: CPT

## 2017-12-19 PROCEDURE — 97110 THERAPEUTIC EXERCISES: CPT

## 2017-12-19 PROCEDURE — 81001 URINALYSIS AUTO W/SCOPE: CPT

## 2017-12-19 PROCEDURE — 32555 ASPIRATE PLEURA W/ IMAGING: CPT

## 2017-12-19 PROCEDURE — 89051 BODY FLUID CELL COUNT: CPT

## 2017-12-19 PROCEDURE — 73590 X-RAY EXAM OF LOWER LEG: CPT | Mod: 26,LT

## 2017-12-19 PROCEDURE — 97530 THERAPEUTIC ACTIVITIES: CPT

## 2017-12-19 PROCEDURE — 87070 CULTURE OTHR SPECIMN AEROBIC: CPT

## 2017-12-19 PROCEDURE — 82945 GLUCOSE OTHER FLUID: CPT

## 2017-12-19 PROCEDURE — 76775 US EXAM ABDO BACK WALL LIM: CPT

## 2017-12-19 PROCEDURE — 99284 EMERGENCY DEPT VISIT MOD MDM: CPT | Mod: 25

## 2017-12-19 PROCEDURE — 99285 EMERGENCY DEPT VISIT HI MDM: CPT | Mod: 25

## 2017-12-19 PROCEDURE — 87641 MR-STAPH DNA AMP PROBE: CPT

## 2017-12-19 PROCEDURE — 90686 IIV4 VACC NO PRSV 0.5 ML IM: CPT

## 2017-12-19 PROCEDURE — 87075 CULTR BACTERIA EXCEPT BLOOD: CPT

## 2017-12-19 PROCEDURE — 93005 ELECTROCARDIOGRAM TRACING: CPT

## 2017-12-19 PROCEDURE — 85027 COMPLETE CBC AUTOMATED: CPT

## 2017-12-19 PROCEDURE — 82553 CREATINE MB FRACTION: CPT

## 2017-12-19 PROCEDURE — 84443 ASSAY THYROID STIM HORMONE: CPT

## 2017-12-19 PROCEDURE — 83735 ASSAY OF MAGNESIUM: CPT

## 2017-12-19 PROCEDURE — 87015 SPECIMEN INFECT AGNT CONCNTJ: CPT

## 2017-12-19 PROCEDURE — 71045 X-RAY EXAM CHEST 1 VIEW: CPT

## 2017-12-19 PROCEDURE — 93971 EXTREMITY STUDY: CPT

## 2017-12-19 PROCEDURE — 80053 COMPREHEN METABOLIC PANEL: CPT

## 2017-12-19 PROCEDURE — 88108 CYTOPATH CONCENTRATE TECH: CPT

## 2017-12-19 PROCEDURE — 36600 WITHDRAWAL OF ARTERIAL BLOOD: CPT

## 2017-12-19 PROCEDURE — 83615 LACTATE (LD) (LDH) ENZYME: CPT

## 2017-12-19 PROCEDURE — 96361 HYDRATE IV INFUSION ADD-ON: CPT

## 2017-12-19 PROCEDURE — 73564 X-RAY EXAM KNEE 4 OR MORE: CPT | Mod: 26,LT

## 2017-12-19 PROCEDURE — 87205 SMEAR GRAM STAIN: CPT

## 2017-12-19 PROCEDURE — 36415 COLL VENOUS BLD VENIPUNCTURE: CPT

## 2017-12-19 PROCEDURE — 88305 TISSUE EXAM BY PATHOLOGIST: CPT

## 2017-12-19 PROCEDURE — 87640 STAPH A DNA AMP PROBE: CPT

## 2017-12-19 PROCEDURE — 84157 ASSAY OF PROTEIN OTHER: CPT

## 2017-12-19 PROCEDURE — 93306 TTE W/DOPPLER COMPLETE: CPT

## 2017-12-19 PROCEDURE — 87186 SC STD MICRODIL/AGAR DIL: CPT

## 2017-12-19 PROCEDURE — 99283 EMERGENCY DEPT VISIT LOW MDM: CPT | Mod: 25

## 2017-12-19 PROCEDURE — 82042 OTHER SOURCE ALBUMIN QUAN EA: CPT

## 2017-12-19 PROCEDURE — 87086 URINE CULTURE/COLONY COUNT: CPT

## 2017-12-19 PROCEDURE — 87102 FUNGUS ISOLATION CULTURE: CPT

## 2017-12-19 PROCEDURE — 73610 X-RAY EXAM OF ANKLE: CPT | Mod: 26,LT

## 2017-12-19 PROCEDURE — 82803 BLOOD GASES ANY COMBINATION: CPT

## 2017-12-19 PROCEDURE — 86140 C-REACTIVE PROTEIN: CPT

## 2017-12-19 PROCEDURE — 82550 ASSAY OF CK (CPK): CPT

## 2017-12-19 PROCEDURE — 94640 AIRWAY INHALATION TREATMENT: CPT

## 2017-12-19 PROCEDURE — 71250 CT THORAX DX C-: CPT

## 2017-12-19 NOTE — ED PROVIDER NOTE - ATTENDING CONTRIBUTION TO CARE
I, Dr Gibson, have personally performed a face to face diagnostic evaluation on this patient with the PA/NP. I have reviewed the PA/NP's note and agree with the history, Physical exam and plan of care, except for additional note as noted below.     Hist as above significant PE tenderness swelling left knee pain increase with movement.

## 2017-12-19 NOTE — ED PROVIDER NOTE - OBJECTIVE STATEMENT
Patient sent from Assisted living with fracture of left knee. Patient states she fell a week ago, and had some pain in right knee which is better. Denies pain in left knee. Patient has no other complaint.

## 2017-12-19 NOTE — ED PROVIDER NOTE - MEDICAL DECISION MAKING DETAILS
89 yo f with L tibial plateau fx s/p fall 2 days ago, will call ortho consult, knee immobilizer, f/u ortho

## 2017-12-19 NOTE — ED PROVIDER NOTE - LOWER EXTREMITY EXAM, LEFT
TENDERNESS/SWELLING/+open ulcer noted to medial distal lower leg L, no discharge or streaking or signs of infection noted, toes warm&mobile, pulses intact/LIMITED ROM

## 2017-12-19 NOTE — ED PROVIDER NOTE - PROGRESS NOTE DETAILS
Pt examined by ED attending, Dr. Gibson who agreed with disposition and plan. Spoke to Orthopedic Nagi WHATLEY working with Dr. Newman who advised to place L knee in knee immobilizer for tibial plateau fracture, NWB LLE, f/u Dr. Newman within 1 week. Spoke to Orthopedic PANagi working with Dr. Newman who advised to place L knee in knee immobilizer for tibial plateau fracture, no acute surgical intervention need now, NWB LLE, f/u Dr. Newman within 1 week.  Results reported to family, daughter, Niecy at bedside who understood and agreed with disposition and plan.  Pt was also evaluated by hospitalist, Dr. Barton who agreed with plan to discharge pt home and f/u ortho.

## 2017-12-19 NOTE — ED PROVIDER NOTE - OBJECTIVE STATEMENT
89 y/o F with hx of dementia, CAD sent from [facility] for evaluation of L tibial plateau fracture s/p fall Sunday night. As per daughter, pt has unsteady gait but used to be able to ambulate with assistance using walker and might have tripped and fell at facility. Pt was seen here in ED yesterday for same complaints, had L knee xray which showed knee effusion and sent back. As per  at facility, pt was returned to ED today due to c/o pain and for proper immobilization of fracture. Denies other symptoms/injuries.

## 2017-12-19 NOTE — ED ADULT NURSE REASSESSMENT NOTE - NS ED NURSE REASSESS COMMENT FT1
right leg immobilized. dressing changed left medial venous ulcer. pt now denies pain. awaiting transport back to [facility]. family given copy of xrays per request

## 2018-01-10 LAB
CULTURE RESULTS: SIGNIFICANT CHANGE UP
SPECIMEN SOURCE: SIGNIFICANT CHANGE UP

## 2018-02-02 NOTE — DIETITIAN INITIAL EVALUATION ADULT. - PROBLEM/PLAN-6
- Lexapro 20mg daily  - Seroquel 25mg qAM and 100mg qhs, plus 25mg BID PRN anxiety/insomnia  - Trazodone 150mg qhs      Patient remains depressed - little improvement to date on unit.  Cont supportive psychotherapy to augment.  More time is needed for medication adjustments to take effect     DISPLAY PLAN FREE TEXT

## 2018-02-17 ENCOUNTER — INPATIENT (INPATIENT)
Facility: HOSPITAL | Age: 83
LOS: 4 days | Discharge: HOSPICE | DRG: 177 | End: 2018-02-22
Attending: INTERNAL MEDICINE | Admitting: INTERNAL MEDICINE
Payer: MEDICARE

## 2018-02-17 VITALS
SYSTOLIC BLOOD PRESSURE: 109 MMHG | TEMPERATURE: 98 F | HEIGHT: 64 IN | RESPIRATION RATE: 16 BRPM | DIASTOLIC BLOOD PRESSURE: 78 MMHG | HEART RATE: 67 BPM | OXYGEN SATURATION: 91 % | WEIGHT: 100.09 LBS

## 2018-02-17 DIAGNOSIS — C91.10 CHRONIC LYMPHOCYTIC LEUKEMIA OF B-CELL TYPE NOT HAVING ACHIEVED REMISSION: ICD-10-CM

## 2018-02-17 DIAGNOSIS — F03.91 UNSPECIFIED DEMENTIA WITH BEHAVIORAL DISTURBANCE: ICD-10-CM

## 2018-02-17 DIAGNOSIS — S72.002A FRACTURE OF UNSPECIFIED PART OF NECK OF LEFT FEMUR, INITIAL ENCOUNTER FOR CLOSED FRACTURE: Chronic | ICD-10-CM

## 2018-02-17 DIAGNOSIS — K21.9 GASTRO-ESOPHAGEAL REFLUX DISEASE WITHOUT ESOPHAGITIS: ICD-10-CM

## 2018-02-17 DIAGNOSIS — R19.7 DIARRHEA, UNSPECIFIED: ICD-10-CM

## 2018-02-17 DIAGNOSIS — I10 ESSENTIAL (PRIMARY) HYPERTENSION: ICD-10-CM

## 2018-02-17 DIAGNOSIS — I38 ENDOCARDITIS, VALVE UNSPECIFIED: ICD-10-CM

## 2018-02-17 DIAGNOSIS — E03.9 HYPOTHYROIDISM, UNSPECIFIED: ICD-10-CM

## 2018-02-17 DIAGNOSIS — J18.9 PNEUMONIA, UNSPECIFIED ORGANISM: ICD-10-CM

## 2018-02-17 DIAGNOSIS — I25.10 ATHEROSCLEROTIC HEART DISEASE OF NATIVE CORONARY ARTERY WITHOUT ANGINA PECTORIS: ICD-10-CM

## 2018-02-17 LAB
ALBUMIN SERPL ELPH-MCNC: 3 G/DL — LOW (ref 3.3–5)
ALP SERPL-CCNC: 102 U/L — SIGNIFICANT CHANGE UP (ref 30–120)
ALT FLD-CCNC: 11 U/L DA — SIGNIFICANT CHANGE UP (ref 10–60)
ANION GAP SERPL CALC-SCNC: 9 MMOL/L — SIGNIFICANT CHANGE UP (ref 5–17)
APPEARANCE UR: CLEAR — SIGNIFICANT CHANGE UP
APTT BLD: 27.2 SEC — LOW (ref 27.5–37.4)
AST SERPL-CCNC: 19 U/L — SIGNIFICANT CHANGE UP (ref 10–40)
BILIRUB SERPL-MCNC: 1.2 MG/DL — SIGNIFICANT CHANGE UP (ref 0.2–1.2)
BILIRUB UR-MCNC: NEGATIVE — SIGNIFICANT CHANGE UP
BUN SERPL-MCNC: 27 MG/DL — HIGH (ref 7–23)
CALCIUM SERPL-MCNC: 8.9 MG/DL — SIGNIFICANT CHANGE UP (ref 8.4–10.5)
CHLORIDE SERPL-SCNC: 105 MMOL/L — SIGNIFICANT CHANGE UP (ref 96–108)
CO2 SERPL-SCNC: 25 MMOL/L — SIGNIFICANT CHANGE UP (ref 22–31)
COLOR SPEC: YELLOW — SIGNIFICANT CHANGE UP
CREAT SERPL-MCNC: 1.61 MG/DL — HIGH (ref 0.5–1.3)
DIFF PNL FLD: ABNORMAL
GLUCOSE SERPL-MCNC: 74 MG/DL — SIGNIFICANT CHANGE UP (ref 70–99)
GLUCOSE UR QL: NEGATIVE MG/DL — SIGNIFICANT CHANGE UP
HCT VFR BLD CALC: 36.9 % — SIGNIFICANT CHANGE UP (ref 34.5–45)
HGB BLD-MCNC: 11.7 G/DL — SIGNIFICANT CHANGE UP (ref 11.5–15.5)
INR BLD: 1.06 RATIO — SIGNIFICANT CHANGE UP (ref 0.88–1.16)
KETONES UR-MCNC: NEGATIVE — SIGNIFICANT CHANGE UP
LACTATE SERPL-SCNC: 1.9 MMOL/L — SIGNIFICANT CHANGE UP (ref 0.7–2)
LEUKOCYTE ESTERASE UR-ACNC: ABNORMAL
LYMPHOCYTES # BLD AUTO: 44 % — SIGNIFICANT CHANGE UP (ref 13–44)
MCHC RBC-ENTMCNC: 28.4 PG — SIGNIFICANT CHANGE UP (ref 27–34)
MCHC RBC-ENTMCNC: 31.6 GM/DL — LOW (ref 32–36)
MCV RBC AUTO: 89.8 FL — SIGNIFICANT CHANGE UP (ref 80–100)
MONOCYTES NFR BLD AUTO: 3 % — SIGNIFICANT CHANGE UP (ref 2–14)
NEUTROPHILS NFR BLD AUTO: 53 % — SIGNIFICANT CHANGE UP (ref 43–77)
NITRITE UR-MCNC: NEGATIVE — SIGNIFICANT CHANGE UP
PH UR: 6 — SIGNIFICANT CHANGE UP (ref 5–8)
PLATELET # BLD AUTO: 200 K/UL — SIGNIFICANT CHANGE UP (ref 150–400)
POTASSIUM SERPL-MCNC: 4.4 MMOL/L — SIGNIFICANT CHANGE UP (ref 3.5–5.3)
POTASSIUM SERPL-SCNC: 4.4 MMOL/L — SIGNIFICANT CHANGE UP (ref 3.5–5.3)
PROT SERPL-MCNC: 5.6 G/DL — LOW (ref 6–8.3)
PROT UR-MCNC: 30 MG/DL
PROTHROM AB SERPL-ACNC: 11.6 SEC — SIGNIFICANT CHANGE UP (ref 9.8–12.7)
RBC # BLD: 4.12 M/UL — SIGNIFICANT CHANGE UP (ref 3.8–5.2)
RBC # FLD: 16 % — HIGH (ref 10.3–14.5)
SODIUM SERPL-SCNC: 139 MMOL/L — SIGNIFICANT CHANGE UP (ref 135–145)
SP GR SPEC: 1.01 — SIGNIFICANT CHANGE UP (ref 1.01–1.02)
UROBILINOGEN FLD QL: 4 MG/DL
WBC # BLD: 20.5 K/UL — HIGH (ref 3.8–10.5)
WBC # FLD AUTO: 20.5 K/UL — HIGH (ref 3.8–10.5)

## 2018-02-17 PROCEDURE — 70450 CT HEAD/BRAIN W/O DYE: CPT | Mod: 26

## 2018-02-17 PROCEDURE — 71045 X-RAY EXAM CHEST 1 VIEW: CPT | Mod: 26

## 2018-02-17 PROCEDURE — 99285 EMERGENCY DEPT VISIT HI MDM: CPT

## 2018-02-17 RX ORDER — SODIUM CHLORIDE 9 MG/ML
1000 INJECTION INTRAMUSCULAR; INTRAVENOUS; SUBCUTANEOUS ONCE
Qty: 0 | Refills: 0 | Status: COMPLETED | OUTPATIENT
Start: 2018-02-17 | End: 2018-02-17

## 2018-02-17 RX ORDER — AZTREONAM 2 G
500 VIAL (EA) INJECTION EVERY 8 HOURS
Qty: 0 | Refills: 0 | Status: DISCONTINUED | OUTPATIENT
Start: 2018-02-18 | End: 2018-02-19

## 2018-02-17 RX ORDER — LEVOTHYROXINE SODIUM 125 MCG
100 TABLET ORAL DAILY
Qty: 0 | Refills: 0 | Status: DISCONTINUED | OUTPATIENT
Start: 2018-02-17 | End: 2018-02-18

## 2018-02-17 RX ORDER — SODIUM CHLORIDE 9 MG/ML
1000 INJECTION, SOLUTION INTRAVENOUS
Qty: 0 | Refills: 0 | Status: DISCONTINUED | OUTPATIENT
Start: 2018-02-17 | End: 2018-02-19

## 2018-02-17 RX ORDER — BUPROPION HYDROCHLORIDE 150 MG/1
150 TABLET, EXTENDED RELEASE ORAL DAILY
Qty: 0 | Refills: 0 | Status: DISCONTINUED | OUTPATIENT
Start: 2018-02-17 | End: 2018-02-18

## 2018-02-17 RX ORDER — BUDESONIDE AND FORMOTEROL FUMARATE DIHYDRATE 160; 4.5 UG/1; UG/1
2 AEROSOL RESPIRATORY (INHALATION)
Qty: 0 | Refills: 0 | Status: DISCONTINUED | OUTPATIENT
Start: 2018-02-17 | End: 2018-02-18

## 2018-02-17 RX ORDER — CARVEDILOL PHOSPHATE 80 MG/1
3.12 CAPSULE, EXTENDED RELEASE ORAL EVERY 12 HOURS
Qty: 0 | Refills: 0 | Status: DISCONTINUED | OUTPATIENT
Start: 2018-02-17 | End: 2018-02-18

## 2018-02-17 RX ORDER — TIOTROPIUM BROMIDE 18 UG/1
1 CAPSULE ORAL; RESPIRATORY (INHALATION) DAILY
Qty: 0 | Refills: 0 | Status: DISCONTINUED | OUTPATIENT
Start: 2018-02-17 | End: 2018-02-18

## 2018-02-17 RX ORDER — ISOSORBIDE DINITRATE 5 MG/1
10 TABLET ORAL THREE TIMES A DAY
Qty: 0 | Refills: 0 | Status: DISCONTINUED | OUTPATIENT
Start: 2018-02-17 | End: 2018-02-18

## 2018-02-17 RX ORDER — HYDRALAZINE HCL 50 MG
50 TABLET ORAL EVERY 8 HOURS
Qty: 0 | Refills: 0 | Status: DISCONTINUED | OUTPATIENT
Start: 2018-02-17 | End: 2018-02-18

## 2018-02-17 RX ORDER — AZTREONAM 2 G
VIAL (EA) INJECTION
Qty: 0 | Refills: 0 | Status: DISCONTINUED | OUTPATIENT
Start: 2018-02-17 | End: 2018-02-19

## 2018-02-17 RX ORDER — AZTREONAM 2 G
500 VIAL (EA) INJECTION ONCE
Qty: 0 | Refills: 0 | Status: COMPLETED | OUTPATIENT
Start: 2018-02-17 | End: 2018-02-17

## 2018-02-17 RX ORDER — CARBIDOPA AND LEVODOPA 25; 100 MG/1; MG/1
1 TABLET ORAL DAILY
Qty: 0 | Refills: 0 | Status: DISCONTINUED | OUTPATIENT
Start: 2018-02-17 | End: 2018-02-18

## 2018-02-17 RX ORDER — ACETAMINOPHEN 500 MG
650 TABLET ORAL EVERY 6 HOURS
Qty: 0 | Refills: 0 | Status: DISCONTINUED | OUTPATIENT
Start: 2018-02-17 | End: 2018-02-18

## 2018-02-17 RX ORDER — PANTOPRAZOLE SODIUM 20 MG/1
40 TABLET, DELAYED RELEASE ORAL DAILY
Qty: 0 | Refills: 0 | Status: DISCONTINUED | OUTPATIENT
Start: 2018-02-17 | End: 2018-02-22

## 2018-02-17 RX ORDER — VANCOMYCIN HCL 1 G
1000 VIAL (EA) INTRAVENOUS ONCE
Qty: 0 | Refills: 0 | Status: COMPLETED | OUTPATIENT
Start: 2018-02-17 | End: 2018-02-17

## 2018-02-17 RX ORDER — ALBUTEROL 90 UG/1
2 AEROSOL, METERED ORAL EVERY 6 HOURS
Qty: 0 | Refills: 0 | Status: DISCONTINUED | OUTPATIENT
Start: 2018-02-17 | End: 2018-02-18

## 2018-02-17 RX ORDER — LACTOBACILLUS ACIDOPHILUS 100MM CELL
1 CAPSULE ORAL DAILY
Qty: 0 | Refills: 0 | Status: DISCONTINUED | OUTPATIENT
Start: 2018-02-17 | End: 2018-02-18

## 2018-02-17 RX ADMIN — Medication 50 MILLIGRAM(S): at 22:45

## 2018-02-17 RX ADMIN — SODIUM CHLORIDE 70 MILLILITER(S): 9 INJECTION, SOLUTION INTRAVENOUS at 22:52

## 2018-02-17 RX ADMIN — Medication 250 MILLIGRAM(S): at 21:15

## 2018-02-17 RX ADMIN — SODIUM CHLORIDE 1000 MILLILITER(S): 9 INJECTION INTRAMUSCULAR; INTRAVENOUS; SUBCUTANEOUS at 19:10

## 2018-02-17 NOTE — H&P ADULT - ATTENDING COMMENTS
60 minutes spent on this visit, 50% visit time spent in care co-ordination with other attendings and counselling patient

## 2018-02-17 NOTE — ED PROVIDER NOTE - ATTENDING CONTRIBUTION TO CARE
Kyle Marinelli MD: I have personally performed a face to face diagnostic evaluation on this patient.  I have reviewed the PA note and agree with the history, exam, and plan of care, except as noted.  History and Exam by me shows same findings as documented  Attending Note: Patient sent for "lethargy". Unable to contribute to history. Exam shows mild tachypnea. Appears pale. Mild edema of LE's. Agree with plan

## 2018-02-17 NOTE — H&P ADULT - HISTORY OF PRESENT ILLNESS
89 yr old with PMH of Dementia,depression,severe aortic incompetence ,, CVA- haemorruagic stroke in 2005,severe GI bleed 4 yrs back,, gait disbility following CVA, parkinson hypothyroid, lactose intolerant,allergic to penicillin and contrast dye,,   CHF systolic  diastolic with copd  asymptomatic bacteruria, with h/o ESBL e coli colonization, with  fraility , CKD 3., GERD.   patient had thoracentesis on 11/20, and it is transudative    brought back for woesening lethargy, poor po intake diarrhea,  IN ED suspected HCAP started on vanc and aztreonam, being admitted for further care

## 2018-02-17 NOTE — ED ADULT NURSE REASSESSMENT NOTE - SKIN INTEGRITY
scab on left forearm, hard/bruising scab on left forearm, hard mass on left plantar foot, blanchable redness on b/l heels and sacrum/bruising

## 2018-02-17 NOTE — ED PROVIDER NOTE - PROGRESS NOTE DETAILS
discussed pt with pt son madelyn who advised pt has been more lethargic as of recently with decreased po intake. advised pt fx knee 2 months ago. pt with PNA, will give vanco and aztreonam. message left with dr keyes and karma to see pt tomorrow. discussed results with pts son who agrees to admission. discussed with dr levine who agrees with admission.

## 2018-02-17 NOTE — H&P ADULT - ASSESSMENT
89 yr old with PMH of Dementia,depression,severe aortic incompetence ,, CVA- haemorruagic stroke in 2005,severe GI bleed 4 yrs back,, gait disbility following CVA, parkinson hypothyroid, lactose intolerant,allergic to penicillin and contrast dye,,   CHF systolic  diastolic with copd  asymptomatic bacteruria, with h/o ESBL e coli colonization, with  fraility , CKD 3., GERD.   patient had thoracentesis on 11/20, and it is transudative    brought back for woesening lethargy, poor po intake diarrhea,  IN ED suspected HCAP started on vanc and aztreonam, being admitted for further care 89 yr old with PMH of Dementia,depression,severe aortic incompetence ,, CVA- haemorruagic stroke in 2005,severe GI bleed 4 yrs back,, gait disbility following CVA, parkinson hypothyroid, lactose intolerant,allergic to penicillin and contrast dye,,   CHF systolic  diastolic with copd  asymptomatic bacteruria, with h/o ESBL e coli colonization, with  fraility , CKD 3., GERD.   patient had thoracentesis on 11/20, and it is transudative    brought back for woesening lethargy, poor po intake diarrhea,altered mental state likely encephalopathy with advanced dementia dehydration lloyd ckd  IN ED suspected HCAP started on vanc and aztreonam, being admitted for further care

## 2018-02-17 NOTE — ED ADULT NURSE NOTE - OBJECTIVE STATEMENT
Presents to ED via amb from [facility]. Pt 's papers state AMS in pt. Upon arrival to ED pt is responsive to verbal stimili- answers to name. Minimal response to painful stimuli. Color fair . Skin warm & dry to touch. Lungs diminished at bases. Abd soft nontender. Pt has discoloration to all extremities. Scab noted on rt lower leg & rt forearm. Redness noted along spine- no breakdown. Sacral area red but blanchable without skin breakdown. Hardened mass noteed on plantar aspect of lt ft

## 2018-02-18 DIAGNOSIS — R00.1 BRADYCARDIA, UNSPECIFIED: ICD-10-CM

## 2018-02-18 LAB
ANION GAP SERPL CALC-SCNC: 9 MMOL/L — SIGNIFICANT CHANGE UP (ref 5–17)
BUN SERPL-MCNC: 23 MG/DL — SIGNIFICANT CHANGE UP (ref 7–23)
CALCIUM SERPL-MCNC: 8.4 MG/DL — SIGNIFICANT CHANGE UP (ref 8.4–10.5)
CHLORIDE SERPL-SCNC: 105 MMOL/L — SIGNIFICANT CHANGE UP (ref 96–108)
CHOLEST SERPL-MCNC: 129 MG/DL — SIGNIFICANT CHANGE UP (ref 10–199)
CO2 SERPL-SCNC: 25 MMOL/L — SIGNIFICANT CHANGE UP (ref 22–31)
CREAT SERPL-MCNC: 1.4 MG/DL — HIGH (ref 0.5–1.3)
CRP SERPL-MCNC: 5.9 MG/DL — HIGH (ref 0–0.4)
CULTURE RESULTS: NO GROWTH — SIGNIFICANT CHANGE UP
GLUCOSE SERPL-MCNC: 113 MG/DL — HIGH (ref 70–99)
HCT VFR BLD CALC: 32.1 % — LOW (ref 34.5–45)
HDLC SERPL-MCNC: 48 MG/DL — SIGNIFICANT CHANGE UP (ref 40–125)
HGB BLD-MCNC: 10.6 G/DL — LOW (ref 11.5–15.5)
LIPID PNL WITH DIRECT LDL SERPL: 61 MG/DL — SIGNIFICANT CHANGE UP
MCHC RBC-ENTMCNC: 29.4 PG — SIGNIFICANT CHANGE UP (ref 27–34)
MCHC RBC-ENTMCNC: 32.9 GM/DL — SIGNIFICANT CHANGE UP (ref 32–36)
MCV RBC AUTO: 89.1 FL — SIGNIFICANT CHANGE UP (ref 80–100)
NT-PROBNP SERPL-SCNC: HIGH PG/ML (ref 0–450)
PLATELET # BLD AUTO: 159 K/UL — SIGNIFICANT CHANGE UP (ref 150–400)
POTASSIUM SERPL-MCNC: 4.1 MMOL/L — SIGNIFICANT CHANGE UP (ref 3.5–5.3)
POTASSIUM SERPL-SCNC: 4.1 MMOL/L — SIGNIFICANT CHANGE UP (ref 3.5–5.3)
RBC # BLD: 3.6 M/UL — LOW (ref 3.8–5.2)
RBC # FLD: 16 % — HIGH (ref 10.3–14.5)
SODIUM SERPL-SCNC: 139 MMOL/L — SIGNIFICANT CHANGE UP (ref 135–145)
SPECIMEN SOURCE: SIGNIFICANT CHANGE UP
T3 SERPL-MCNC: 35 NG/DL — LOW (ref 80–200)
T4 AB SER-ACNC: 5.9 UG/DL — SIGNIFICANT CHANGE UP (ref 4.6–12)
TOTAL CHOLESTEROL/HDL RATIO MEASUREMENT: 2.7 RATIO — LOW (ref 3.3–7.1)
TRIGL SERPL-MCNC: 101 MG/DL — SIGNIFICANT CHANGE UP (ref 10–149)
TSH SERPL-MCNC: 7.18 UIU/ML — HIGH (ref 0.27–4.2)
WBC # BLD: 10.4 K/UL — SIGNIFICANT CHANGE UP (ref 3.8–10.5)
WBC # FLD AUTO: 10.4 K/UL — SIGNIFICANT CHANGE UP (ref 3.8–10.5)

## 2018-02-18 PROCEDURE — 12345: CPT | Mod: NC

## 2018-02-18 PROCEDURE — 74018 RADEX ABDOMEN 1 VIEW: CPT | Mod: 26

## 2018-02-18 RX ORDER — ALBUTEROL 90 UG/1
2.5 AEROSOL, METERED ORAL EVERY 6 HOURS
Qty: 0 | Refills: 0 | Status: DISCONTINUED | OUTPATIENT
Start: 2018-02-18 | End: 2018-02-22

## 2018-02-18 RX ORDER — LEVOTHYROXINE SODIUM 125 MCG
50 TABLET ORAL
Qty: 0 | Refills: 0 | Status: DISCONTINUED | OUTPATIENT
Start: 2018-02-18 | End: 2018-02-22

## 2018-02-18 RX ORDER — ACETAMINOPHEN 500 MG
650 TABLET ORAL EVERY 6 HOURS
Qty: 0 | Refills: 0 | Status: DISCONTINUED | OUTPATIENT
Start: 2018-02-18 | End: 2018-02-22

## 2018-02-18 RX ORDER — HYDRALAZINE HCL 50 MG
10 TABLET ORAL EVERY 4 HOURS
Qty: 0 | Refills: 0 | Status: DISCONTINUED | OUTPATIENT
Start: 2018-02-18 | End: 2018-02-22

## 2018-02-18 RX ORDER — METOPROLOL TARTRATE 50 MG
5 TABLET ORAL EVERY 6 HOURS
Qty: 0 | Refills: 0 | Status: DISCONTINUED | OUTPATIENT
Start: 2018-02-18 | End: 2018-02-22

## 2018-02-18 RX ADMIN — ALBUTEROL 2.5 MILLIGRAM(S): 90 AEROSOL, METERED ORAL at 07:48

## 2018-02-18 RX ADMIN — PANTOPRAZOLE SODIUM 40 MILLIGRAM(S): 20 TABLET, DELAYED RELEASE ORAL at 12:39

## 2018-02-18 RX ADMIN — ISOSORBIDE DINITRATE 10 MILLIGRAM(S): 5 TABLET ORAL at 05:57

## 2018-02-18 RX ADMIN — CARBIDOPA AND LEVODOPA 1 TABLET(S): 25; 100 TABLET ORAL at 12:38

## 2018-02-18 RX ADMIN — BUPROPION HYDROCHLORIDE 150 MILLIGRAM(S): 150 TABLET, EXTENDED RELEASE ORAL at 12:37

## 2018-02-18 RX ADMIN — Medication 50 MILLIGRAM(S): at 05:56

## 2018-02-18 RX ADMIN — Medication 50 MILLIGRAM(S): at 14:28

## 2018-02-18 RX ADMIN — Medication 50 MILLIGRAM(S): at 21:13

## 2018-02-18 RX ADMIN — Medication 100 MICROGRAM(S): at 05:57

## 2018-02-18 RX ADMIN — Medication 1 TABLET(S): at 12:38

## 2018-02-18 RX ADMIN — ALBUTEROL 2.5 MILLIGRAM(S): 90 AEROSOL, METERED ORAL at 13:50

## 2018-02-18 NOTE — DIETITIAN INITIAL EVALUATION ADULT. - OTHER INFO
Severely malnourished appearing pt with pmhx as below admitted with diarrhea and being treated for PNA. She has a stage I pressure injury to sacrum and L heel. She is npo at this time. Nutrition assessment from last admission reviewed. Pt was on puree with nectar thick liquids. Last official  swallow eval 10-2-17 recommending mech soft with nectar thick liquids. Pt is appropriately on a probiotic in view of antibiotics and diarrhea. Addendum: per anil , pt unable to swallow any po meds at this time, she is on phone with MD, she states son is considering palliative care. pt needs a swallow eval. If pt fails, son will need to decide on a feeding tube or not.

## 2018-02-18 NOTE — CHART NOTE - NSCHARTNOTEFT_GEN_A_CORE
Upon Nutritional Assessment by the Registered Dietitian your patient was determined to meet criteria / has evidence of the following diagnosis/diagnoses:          [ ]  Mild Protein Calorie Malnutrition        [ ]  Moderate Protein Calorie Malnutrition        [X ] Severe Protein Calorie Malnutrition        [ ] Unspecified Protein Calorie Malnutrition        [ ] Underweight / BMI <19        [ ] Morbid Obesity / BMI > 40      Findings as based on:  •  Comprehensive nutrition assessment and consultation  •  Calorie counts (nutrient intake analysis)  •  Food acceptance and intake status from observations by staff  •  Follow up  •  Patient education  •  Intervention secondary to interdisciplinary rounds  •   concerns  NFPE - severe depletion of subcutaneous fat ( orbital and upper arm region) + severe depletion of muscle - temporal and calf region    Treatment:    The following diet has been recommended:  first needs swallow eval by slp, if passes - nectar thick Ensure Enlive, If pt fails, need consent from son for enteral support.      PROVIDER Section:     By signing this assessment you are acknowledging and agree with the diagnosis/diagnoses assigned by the Registered Dietitian    Comments:

## 2018-02-18 NOTE — DIETITIAN INITIAL EVALUATION ADULT. - NS AS NUTRI INTERV MEALS SNACK
Other (specify)/resume oral diet asap, feeding assistance and encouragement, nutrient dense supplement

## 2018-02-18 NOTE — CONSULT NOTE ADULT - SUBJECTIVE AND OBJECTIVE BOX
The patient is a 89 year-old woman with past medical history of dementia, depression, severe aortic incompetence, CVA with gait instability, severe GI bleed, Parkinson's Disease, hypothyroidism, CHF systolic/diastolic (patient had thoracentesis on ), COPD, asymptomatic bacteruria, with h/o ESBL E coli colonization, CKD 3, and GERD who was admitted yesterday with lethargy, decreased oral intake, and diarrhea. She is being treated for suspected HCAP started with vancomycin and aztreonam. Gastroenterology is consulted for diarrhea. Unfortunately, the patient is unable to provide history Information is obtained via the medical chart and discussion with members of medical staff. Per documentation she has lactose intolerance.  There are no reports of rectal bleeding. No fevers noted.     PAST MEDICAL & SURGICAL HISTORY:  CLL (chronic lymphocytic leukemia)  Dementia with behavioral disturbance, unspecified dementia type  Gastrointestinal hemorrhage, unspecified gastrointestinal hemorrhage type  Nontraumatic intracerebral hemorrhage, unspecified cerebral location, unspecified laterality  Rhinitis  Anxiety  GERD (gastroesophageal reflux disease)  Hypothyroidism  Coronary artery disease  COPD (chronic obstructive pulmonary disease)  Closed fracture of neck of left femur, initial encounter    REVIEW OF SYSTEMS:  Unable to obtain givn cognitive limitations    MEDICATIONS  (STANDING):  ALBUTerol    0.083% 2.5 milliGRAM(s) Nebulizer every 6 hours  aztreonam  IVPB 500 milliGRAM(s) IV Intermittent every 8 hours  aztreonam  IVPB      buPROPion XL . 150 milliGRAM(s) Oral daily  carbidopa/levodopa  25/100 1 Tablet(s) Oral daily  carvedilol 3.125 milliGRAM(s) Oral every 12 hours  dextrose 5% + sodium chloride 0.45%. 1000 milliLiter(s) (30 mL/Hr) IV Continuous <Continuous>  hydrALAZINE 50 milliGRAM(s) Oral every 8 hours  isosorbide   dinitrate Tablet (ISORDIL) 10 milliGRAM(s) Oral three times a day  lactobacillus acidophilus 1 Tablet(s) Oral daily  levothyroxine 100 MICROGram(s) Oral daily  pantoprazole  Injectable 40 milliGRAM(s) IV Push daily    MEDICATIONS  (PRN):  acetaminophen   Tablet 650 milliGRAM(s) Oral every 6 hours PRN For Temp greater than 38 C (100.4 F)  acetaminophen   Tablet. 650 milliGRAM(s) Oral every 6 hours PRN Moderate Pain (4 - 6)    Allergies:  IV Contrast (Unknown)  penicillin (Unknown)    SOCIAL HISTORY: From NH    FAMILY HISTORY: Non-contributory    Vital Signs Last 24 Hrs  T(C): 35.2 (2018 05:58), Max: 36.4 (2018 18:32)  T(F): 95.3 (2018 05:58), Max: 97.5 (2018 18:32)  HR: 60 (2018 07:48) (60 - 67)  BP: 119/60 (2018 05:10) (109/78 - 149/60)  BP(mean): --  RR: 18 (2018 05:10) (16 - 20)  SpO2: 94% (2018 07:48) (90% - 98%)    PHYSICAL EXAM:    GENERAL: Warming blanket, non-verbal  HEAD:  Atraumatic, + temporal wasting  EYES: Anicteric sclera  CHEST/LUNG: Decreased BS  HEART: Regular rate and rhythm  ABDOMEN: Soft, Non-tender, Mildly distended  EXTREMITIES:  No edema  SKIN: No jaundice    LABS:                        10.6   10.4  )-----------( 159      ( 2018 07:12 )             32.1     02-18    139  |  105  |  23  ----------------------------<  113<H>  4.1   |  25  |  1.40<H>    Ca    8.4      2018 07:12    TPro  5.6<L>  /  Alb  3.0<L>  /  TBili  1.2  /  DBili  x   /  AST  19  /  ALT  11  /  AlkPhos  102  02-17    PT/INR - ( 2018 19:13 )   PT: 11.6 sec;   INR: 1.06 ratio         PTT - ( 2018 19:13 )  PTT:27.2 sec  Urinalysis Basic - ( 2018 20:34 )    Color: Yellow / Appearance: Clear / S.015 / pH: x  Gluc: x / Ketone: Negative  / Bili: Negative / Urobili: 4 mg/dL   Blood: x / Protein: 30 mg/dL / Nitrite: Negative   Leuk Esterase: Moderate / RBC: 6-10 /HPF / WBC >50   Sq Epi: x / Non Sq Epi: Few / Bacteria: Negative

## 2018-02-18 NOTE — DIETITIAN INITIAL EVALUATION ADULT. - FACTORS AFF FOOD INTAKE
other (specify)/change in mental status/difficulty feeding self/difficulty swallowing/also npo at this time

## 2018-02-18 NOTE — CONSULT NOTE ADULT - SUBJECTIVE AND OBJECTIVE BOX
CARDIOLOGY CONSULT NOTE    Patient is a 89y Female with a known history of :  Valvular heart disease (I38)  Diarrhea, unspecified type (R19.7)  Gastroesophageal reflux disease without esophagitis (K21.9)  Hypothyroidism, unspecified type (E03.9)  CLL (chronic lymphocytic leukemia) (C91.10)  Dementia with behavioral disturbance, unspecified dementia type (F03.91)  HTN (hypertension), benign (I10)  Coronary artery disease involving native coronary artery of native heart without angina pectoris (I25.10)  PNA (pneumonia) (J18.9)    HPI:  89 yr old with PMH of Dementia,depression,severe aortic incompetence ,, CVA- haemorruagic stroke in 2005,severe GI bleed 4 yrs back,, gait disbility following CVA, parkinson hypothyroid, lactose intolerant,allergic to penicillin and contrast dye,,   CHF systolic  diastolic with copd  asymptomatic bacteruria, with h/o ESBL e coli colonization, with  fraility , CKD 3., GERD.   patient had thoracentesis on 11/20, and it is transudative    brought back for woesening lethargy, poor po intake diarrhea,  IN ED suspected HCAP started on vanc and aztreonam, being admitted for further care (17 Feb 2018 21:15)      REVIEW OF SYSTEMS: (as per chart)    CONSTITUTIONAL: No fever, weight loss, or fatigue  EYES: No eye pain, visual disturbances, or discharge  ENMT:  No difficulty hearing, tinnitus, vertigo; No sinus or throat pain  NECK: No pain or stiffness    RESPIRATORY: No cough, wheezing, chills or hemoptysis; No shortness of breath  CARDIOVASCULAR: No chest pain, palpitations, dizziness, or leg swelling  GASTROINTESTINAL: No abdominal or epigastric pain. No nausea, vomiting, or hematemesis; No diarrhea or constipation. No melena or hematochezia.    NEUROLOGICAL: No headaches, memory loss, loss of strength, numbness, or tremors  SKIN: No itching, burning, rashes, or lesions       MUSCULOSKELETAL: No joint pain or swelling; No muscle, back, or extremity pain  PSYCHIATRIC: No depression, anxiety, mood swings, or difficulty sleeping    ALLERGY AND IMMUNOLOGIC: No hives or eczema    MEDICATIONS  (STANDING):  ALBUTerol    0.083% 2.5 milliGRAM(s) Nebulizer every 6 hours  aztreonam  IVPB 500 milliGRAM(s) IV Intermittent every 8 hours  aztreonam  IVPB      buPROPion XL . 150 milliGRAM(s) Oral daily  carbidopa/levodopa  25/100 1 Tablet(s) Oral daily  carvedilol 3.125 milliGRAM(s) Oral every 12 hours  dextrose 5% + sodium chloride 0.45%. 1000 milliLiter(s) (30 mL/Hr) IV Continuous <Continuous>  hydrALAZINE 50 milliGRAM(s) Oral every 8 hours  isosorbide   dinitrate Tablet (ISORDIL) 10 milliGRAM(s) Oral three times a day  lactobacillus acidophilus 1 Tablet(s) Oral daily  levothyroxine 100 MICROGram(s) Oral daily  pantoprazole  Injectable 40 milliGRAM(s) IV Push daily    MEDICATIONS  (PRN):  acetaminophen   Tablet 650 milliGRAM(s) Oral every 6 hours PRN For Temp greater than 38 C (100.4 F)  acetaminophen   Tablet. 650 milliGRAM(s) Oral every 6 hours PRN Moderate Pain (4 - 6)      ALLERGIES: IV Contrast (Unknown)  penicillin (Unknown)      FAMILY HISTORY:      Social History:  Alochol:   Smoking:   Drug Use:   Marital Status:     PHYSICAL EXAMINATION:  -----------------------------  T(C): 36.8 (02-18-18 @ 09:25), Max: 36.8 (02-18-18 @ 09:25)  HR: 53 (02-18-18 @ 09:25) (53 - 67)  BP: 128/65 (02-18-18 @ 09:25) (109/78 - 149/60)  RR: 18 (02-18-18 @ 09:25) (16 - 20)  SpO2: 90% (02-18-18 @ 09:25) (90% - 98%)  Wt(kg): --    02-17 @ 07:01  -  02-18 @ 07:00  --------------------------------------------------------  IN:    dextrose 5% + sodium chloride 0.45%.: 70 mL  Total IN: 70 mL    OUT:  Total OUT: 0 mL    Total NET: 70 mL      02-18 @ 07:01  -  02-18 @ 11:53  --------------------------------------------------------  IN:    dextrose 5% + sodium chloride 0.45%.: 90 mL  Total IN: 90 mL    OUT:  Total OUT: 0 mL    Total NET: 90 mL        Height (cm): 162.56 (02-17 @ 18:32)  Weight (kg): 45.4 (02-17 @ 18:32)  BMI (kg/m2): 17.2 (02-17 @ 18:32)  BSA (m2): 1.46 (02-17 @ 18:32)    Constitutional: asleep, supine flat  HEENT: normal oral mucosa, no oral pallor and no oral cyanosis.   Neck: normal jugular venous A waves present, normal jugular venous V waves present and no jugular venous leong A waves.   Pulmonary: fair air flow, no rales heard while supine  Cardiovascular: heart rate and rhythm were normal, normal S1 and S2 distantd.   Musculoskeletal: the gait could not be assessed..   Extremities: no clubbing of the fingernails, no localized cyanosis, no petechial hemorrhages and no ischemic changes.       LABS:   --------    CBC Full  -  ( 18 Feb 2018 07:12 )  WBC Count : 10.4 K/uL  Hemoglobin : 10.6 g/dL  Hematocrit : 32.1 %  Platelet Count - Automated : 159 K/uL  Mean Cell Volume : 89.1 fl  Mean Cell Hemoglobin : 29.4 pg  Mean Cell Hemoglobin Concentration : 32.9 gm/dL  Auto Neutrophil # : x  Auto Lymphocyte # : x  Auto Monocyte # : x  Auto Eosinophil # : x  Auto Basophil # : x  Auto Neutrophil % : x  Auto Lymphocyte % : x  Auto Monocyte % : x  Auto Eosinophil % : x  Auto Basophil % : x        02-18    139  |  105  |  23  ----------------------------<  113<H>  4.1   |  25  |  1.40<H>    Ca    8.4      18 Feb 2018 07:12    TPro  5.6<L>  /  Alb  3.0<L>  /  TBili  1.2  /  DBili  x   /  AST  19  /  ALT  11  /  AlkPhos  102  02-17             PT/INR - ( 17 Feb 2018 19:13 )   PT: 11.6 sec;   INR: 1.06 ratio         PTT - ( 17 Feb 2018 19:13 )  PTT:27.2 sec              RADIOLOGY:  -----------------  < from: Xray Chest 1 View AP/PA (02.17.18 @ 19:39) >    INTERPRETATION:  Exam Date: 2/17/2018 7:39 PM    History: Unresponsive    Technique: 2 frontal views of the chest with comparison to  11/20/2017    Findings:    Heart is enlarged. Mild to moderate bilateral pleural effusions with   bibasilar atelectasis worse as compared to prior exam. Minimal pulmonary   vascular congestion. The apices are unremarkable. Degenerative changes of   the visualized osseous structures.        Impression:    Mild to moderate bilateral pleural effusions with bibasilar atelectasis   worse as compared to prior exam. Minimal pulmonary vascular congestion    < end of copied text >      ECG: await    Tele = NSR, sine jaja    ECHO:  < from: US Transthoracic Echocardiogram w/Doppler Complete (10.02.17 @ 09:05) >  INTERPRETATION:    Indication: CHF    Technician: Mannie Tolliver    Study Quality:  fair     Measurements:   A root3.0 cm,LA 5.9cm, LVEDD 4.5cm,2.6cm, septal wall   thickness 1.3 cm, posterior wall thickness 1.3 cm   AV velocity 1,7   m/sec, MV velocity 1.1 m /sec  EF 65%    Mitral Valve: mild mitral annular calcification .thickened mitral valve   with moderate regurgitation ,  Aortic Valve:Thickened aortic valve with moderate to  severe   regurgitation   Left Atrium:  severely enlarged  Left Ventricle: Normal size , moderate left ventricular hypertrophy with   normal EF   ,  Pericardium: Trace pericardial effusion   Tricuspid Valve: Appears normal with mild regurgitation with RVSp 31  mm   hg   Pulmonic Valve: appears normal with mild regurgitation   Right Ventricle: Normal size with normal systolic function   Right Atrium: enlarged    Large pleural effusion noted     SUMMARY:  1. Left ventricular hypertrophy with normal EF  2. Biatrial enlargement   3. aortic sclerosis moderate to  severe AI   4. mild Tricuspid regurgitation with RVSP 31 mm hg .  5. Moderate mitral regurgitation.                  [facility]  This document has been electronically signed. Oct  3 2017  8:16AM        < end of copied text >

## 2018-02-18 NOTE — DIETITIAN INITIAL EVALUATION ADULT. - SIGNS/SYMPTOMS
NFPE- severe depl. of subcut. fat (orbital , upper arm region) depl. muscle - temporal , calf,thigh report of diarrhea pta

## 2018-02-18 NOTE — CONSULT NOTE ADULT - CONSULT REASON
dementia  AI  effusions  atelectasis  COPD
AMS possible pneumonia
Diarrhea
Aortic insufficiency, CAD

## 2018-02-18 NOTE — CONSULT NOTE ADULT - SUBJECTIVE AND OBJECTIVE BOX
Date/Time Patient Seen:  		  Referring MD:   Data Reviewed	       Patient is a 89y old  Female who presents with a chief complaint of AMS/Lethargic (18 Feb 2018 01:56)    in bed  seen and examined  vs and meds reviewed    on ABX      Subjective/HPI    known to me from prior admissions, now admitted for poss PNA, leukocytosis, and weakness  89 yr old with PMH of Dementia,depression,severe aortic incompetence ,, CVA- haemorruagic stroke in 2005,severe GI bleed 4 yrs back,, gait disbility following CVA, parkinson hypothyroid, lactose intolerant,allergic to penicillin and contrast dye,,   CHF systolic  diastolic with copd  asymptomatic bacteruria, with h/o ESBL e coli colonization, with  fraility , CKD 3., GERD.   patient had thoracentesis on 11/20, and it is transudative    brought back for woesening lethargy, poor po intake diarrhea,  IN ED suspected HCAP started on vanc and aztreonam,      PAST MEDICAL & SURGICAL HISTORY:  CLL (chronic lymphocytic leukemia)  Dementia with behavioral disturbance, unspecified dementia type  Gastrointestinal hemorrhage, unspecified gastrointestinal hemorrhage type  Nontraumatic intracerebral hemorrhage, unspecified cerebral location, unspecified laterality  Rhinitis  Anxiety  GERD (gastroesophageal reflux disease)  Hypothyroidism  Coronary artery disease  COPD (chronic obstructive pulmonary disease)  Parkinson's disease  Closed fracture of neck of left femur, initial encounter        Medication list         MEDICATIONS  (STANDING):  aztreonam  IVPB 500 milliGRAM(s) IV Intermittent every 8 hours  aztreonam  IVPB      buPROPion XL . 150 milliGRAM(s) Oral daily  carbidopa/levodopa  25/100 1 Tablet(s) Oral daily  carvedilol 3.125 milliGRAM(s) Oral every 12 hours  dextrose 5% + sodium chloride 0.45%. 1000 milliLiter(s) (70 mL/Hr) IV Continuous <Continuous>  hydrALAZINE 50 milliGRAM(s) Oral every 8 hours  isosorbide   dinitrate Tablet (ISORDIL) 10 milliGRAM(s) Oral three times a day  lactobacillus acidophilus 1 Tablet(s) Oral daily  levothyroxine 100 MICROGram(s) Oral daily  pantoprazole  Injectable 40 milliGRAM(s) IV Push daily    MEDICATIONS  (PRN):  acetaminophen   Tablet 650 milliGRAM(s) Oral every 6 hours PRN For Temp greater than 38 C (100.4 F)  acetaminophen   Tablet. 650 milliGRAM(s) Oral every 6 hours PRN Moderate Pain (4 - 6)         Vitals log        ICU Vital Signs Last 24 Hrs  T(C): 35.2 (18 Feb 2018 05:58), Max: 36.4 (17 Feb 2018 18:32)  T(F): 95.3 (18 Feb 2018 05:58), Max: 97.5 (17 Feb 2018 18:32)  HR: 60 (18 Feb 2018 05:10) (60 - 67)  BP: 119/60 (18 Feb 2018 05:10) (109/78 - 149/60)  BP(mean): --  ABP: --  ABP(mean): --  RR: 18 (18 Feb 2018 05:10) (16 - 20)  SpO2: 90% (18 Feb 2018 05:10) (90% - 98%)           Input and Output:  I&O's Detail    17 Feb 2018 07:01  -  18 Feb 2018 07:00  --------------------------------------------------------  IN:    dextrose 5% + sodium chloride 0.45%.: 70 mL  Total IN: 70 mL    OUT:  Total OUT: 0 mL    Total NET: 70 mL          Lab Data                        11.7   20.5  )-----------( 200      ( 17 Feb 2018 19:12 )             36.9     02-17    139  |  105  |  27<H>  ----------------------------<  74  4.4   |  25  |  1.61<H>    Ca    8.9      17 Feb 2018 19:12    TPro  5.6<L>  /  Alb  3.0<L>  /  TBili  1.2  /  DBili  x   /  AST  19  /  ALT  11  /  AlkPhos  102  02-17            Review of Systems	      Objective     Physical Examination    frail  weak  lungs dec BS  abd soft      Pertinent Lab findings & Imaging      Desire:  NO   Adequate UO     I&O's Detail    17 Feb 2018 07:01  -  18 Feb 2018 07:00  --------------------------------------------------------  IN:    dextrose 5% + sodium chloride 0.45%.: 70 mL  Total IN: 70 mL    OUT:  Total OUT: 0 mL    Total NET: 70 mL               Discussed with:     Cultures:	        Radiology

## 2018-02-18 NOTE — PROGRESS NOTE ADULT - ASSESSMENT
89 yr old with PMH of Dementia,depression,severe aortic incompetence ,, CVA- haemorruagic stroke in 2005,severe GI bleed 4 yrs back,, gait disbility following CVA, parkinson hypothyroid, lactose intolerant,allergic to penicillin and contrast dye,,   CHF systolic  diastolic with copd  asymptomatic bacteruria, with h/o ESBL e coli colonization, with  fraility , CKD 3., GERD.   patient had thoracentesis on 11/20, and it is transudative    brought back for woesening lethargy, poor po intake diarrhea,altered mental state likely encephalopathy with advanced dementia dehydration chavo ckd  IN ED suspected HCAP started on vanc and aztreonam, being admitted for further care   from: US Transthoracic Echocardiogram w/Doppler Complete (10.02.17 @ 09:05) >   Left ventricular hypertrophy with normal EF  2. Biatrial enlargement   3. aortic sclerosis moderate to  severe AI   4. mild Tricuspid regurgitation with RVSP 31 mm hg .  5. Moderate mitral regurgitation.  patient has AMS with metabolic encephalopathy with dehydration CHAVO with ckd3 with dehydration with poor PO intake with advanced dementia with behavioural disorder with valvular heart disease with diastolic heart failure with CLL, anemia of ch disease with diarrhea possible impaction, hypothermia, h/o CVA, esbl colonization, possible PNa, r/o sepsis   continue current care.   d/w son, pt DNR, prognosis poor   palliative care d/w son HCPcardio and Id consult

## 2018-02-18 NOTE — PROGRESS NOTE ADULT - SUBJECTIVE AND OBJECTIVE BOX
Patient is a 89y old  Female who presents with a chief complaint of AMS/Lethargic (2018 01:56)      INTERVAL HPI/OVERNIGHT EVENTS:pt continues to be lethargic    Home Medications:  bacitracin 500 units/g topical ointment: Apply topically to affected area once a day (2017 04:37)  buPROPion 150 mg/24 hours (XL) oral tablet, extended release: 1 tab(s) orally once a day (2017 04:37)  Calcium 600+D 600 mg-200 intl units oral tablet: 1 tab(s) orally once a day (2017 04:37)  carbidopa-levodopa 25 mg-100 mg oral tablet: 1 tab(s) orally once a day (2017 04:37)  carvedilol 3.125 mg oral tablet: 1 tab(s) orally every 12 hours (2017 04:37)  DuoNeb 0.5 mg-2.5 mg/3 mL inhalation solution: 3 milliliter(s) inhaled 2 times a day (2017 04:37)  fluticasone 50 mcg/inh nasal spray: 1 spray(s) in each nostril once a day (2017 04:37)  isosorbide dinitrate 30 mg oral tablet: 1 tab(s) orally once a day (2017 04:37)  Multiple Vitamins oral tablet: 1 tab(s) orally once a day (2017 04:37)  omeprazole 20 mg oral delayed release capsule: 1 cap(s) orally once a day (2017 04:37)  Symbicort 160 mcg-4.5 mcg/inh inhalation aerosol: 2 puff(s) inhaled 2 times a day (2017 04:37)      MEDICATIONS  (STANDING):  ALBUTerol    0.083% 2.5 milliGRAM(s) Nebulizer every 6 hours  aztreonam  IVPB 500 milliGRAM(s) IV Intermittent every 8 hours  aztreonam  IVPB      buPROPion XL . 150 milliGRAM(s) Oral daily  carbidopa/levodopa  25/100 1 Tablet(s) Oral daily  carvedilol 3.125 milliGRAM(s) Oral every 12 hours  dextrose 5% + sodium chloride 0.45%. 1000 milliLiter(s) (30 mL/Hr) IV Continuous <Continuous>  hydrALAZINE 50 milliGRAM(s) Oral every 8 hours  isosorbide   dinitrate Tablet (ISORDIL) 10 milliGRAM(s) Oral three times a day  lactobacillus acidophilus 1 Tablet(s) Oral daily  levothyroxine 100 MICROGram(s) Oral daily  pantoprazole  Injectable 40 milliGRAM(s) IV Push daily    MEDICATIONS  (PRN):  acetaminophen   Tablet 650 milliGRAM(s) Oral every 6 hours PRN For Temp greater than 38 C (100.4 F)  acetaminophen   Tablet. 650 milliGRAM(s) Oral every 6 hours PRN Moderate Pain (4 - 6)      Allergies    IV Contrast (Unknown)  penicillin (Unknown)    Intolerances        REVIEW OF SYSTEMS:  pt non verbal lethargic  Vital Signs Last 24 Hrs  T(C): 35.2 (2018 05:58), Max: 36.4 (2018 18:32)  T(F): 95.3 (2018 05:58), Max: 97.5 (2018 18:32)  HR: 60 (2018 07:48) (60 - 67)  BP: 119/60 (2018 05:10) (109/78 - 149/60)  BP(mean): --  RR: 18 (2018 05:10) (16 - 20)  SpO2: 94% (2018 07:48) (90% - 98%)    PHYSICAL EXAM:  GENERAL: lethargic  HEAD:  Atraumatic, Normocephalic  EYES: EOMI, PERRLA, conjunctiva and sclera clear  ENMT: Moist mucous membranes,   NECK: Supple, No JVD, Normal thyroid  NERVOUS SYSTEM: lethargic , does not follow  commands, responds to pain, DTRs 2+ intact and symmetric  CHEST/LUNG: Clear to percussion bilaterally; No rales, rhonchi, wheezing, or rubs  HEART: Regular rate and rhythm; No murmurs, rubs, or gallops  ABDOMEN: Soft, Nontender, Nondistended; Bowel sounds present  EXTREMITIES:  2+ Peripheral Pulses, No clubbing, cyanosis, or edema  LYMPH: No lymphadenopathy noted  SKIN: No rashes or lesions    LABS:                        10.6   10.4  )-----------( 159      ( 2018 07:12 )             32.1     02-18    139  |  105  |  23  ----------------------------<  113<H>  4.1   |  25  |  1.40<H>    Ca    8.4      2018 07:12    TPro  5.6<L>  /  Alb  3.0<L>  /  TBili  1.2  /  DBili  x   /  AST  19  /  ALT  11  /  AlkPhos  102  02-17    PT/INR - ( 2018 19:13 )   PT: 11.6 sec;   INR: 1.06 ratio         PTT - ( 2018 19:13 )  PTT:27.2 sec  Urinalysis Basic - ( 2018 20:34 )    Color: Yellow / Appearance: Clear / S.015 / pH: x  Gluc: x / Ketone: Negative  / Bili: Negative / Urobili: 4 mg/dL   Blood: x / Protein: 30 mg/dL / Nitrite: Negative   Leuk Esterase: Moderate / RBC: 6-10 /HPF / WBC >50   Sq Epi: x / Non Sq Epi: Few / Bacteria: Negative      CAPILLARY BLOOD GLUCOSE              RADIOLOGY & ADDITIONAL TESTS:    Imaging Personally Reviewed:  [ x] YES  [ ] NO    Consultant(s) Notes Reviewed:  [cx ] YES  [ ] NO    Care Discussed with Consultants/Other Providers [ x] YES  [ ] NO

## 2018-02-18 NOTE — CONSULT NOTE ADULT - SUBJECTIVE AND OBJECTIVE BOX
Infectious Diseases Consult by Ethan Ochoa MD    Reason for Consult :    HPI:  89 yr old with PMH of Dementia,depression,severe aortic incompetence ,, CVA- haemorruagic stroke in 2005,severe GI bleed 4 yrs back,, gait disbility following CVA, parkinson hypothyroid, lactose intolerant,allergic to penicillin and contrast dye,,   CHF systolic  diastolic with copd  asymptomatic bacteruria, with h/o ESBL e coli colonization, with  fraility , CKD 3., GERD. She has CLL   patient had thoracentesis on , and it is transudative    brought back for worsening lethargy, poor po intake and diarrhea, although she is reported to be lactose intolerant . Seen by GI . She has not had any BM since admision   IN ED suspected HCAP started on vanc and aztreonam, being admitted for further care         Past Medical & Surgical Hx:  PAST MEDICAL & SURGICAL HISTORY:  CLL (chronic lymphocytic leukemia)  Dementia with behavioral disturbance, unspecified dementia type  Gastrointestinal hemorrhage, unspecified gastrointestinal hemorrhage type  Nontraumatic intracerebral hemorrhage, unspecified cerebral location, unspecified laterality  Rhinitis  Anxiety  GERD (gastroesophageal reflux disease)  Hypothyroidism  Coronary artery disease  COPD (chronic obstructive pulmonary disease)  Closed fracture of neck of left femur, initial encounter      Social History--Retired, resident of [facility]   EtOH: denies   Tobacco: denies   Drug Use: denies       FAMILY HISTORY:      Allergies    IV Contrast (Unknown)  penicillin (Unknown)    Intolerances    Home/ Out patient  Medications :  Home Medications:  bacitracin 500 units/g topical ointment: Apply topically to affected area once a day (2017 04:37)  buPROPion 150 mg/24 hours (XL) oral tablet, extended release: 1 tab(s) orally once a day (2017 04:37)  Calcium 600+D 600 mg-200 intl units oral tablet: 1 tab(s) orally once a day (2017 04:37)  carbidopa-levodopa 25 mg-100 mg oral tablet: 1 tab(s) orally once a day (2017 04:37)  carvedilol 3.125 mg oral tablet: 1 tab(s) orally every 12 hours (2017 04:37)  DuoNeb 0.5 mg-2.5 mg/3 mL inhalation solution: 3 milliliter(s) inhaled 2 times a day (2017 04:37)  fluticasone 50 mcg/inh nasal spray: 1 spray(s) in each nostril once a day (2017 04:37)  isosorbide dinitrate 30 mg oral tablet: 1 tab(s) orally once a day (2017 04:37)  Multiple Vitamins oral tablet: 1 tab(s) orally once a day (2017 04:37)  omeprazole 20 mg oral delayed release capsule: 1 cap(s) orally once a day (2017 04:37)  Symbicort 160 mcg-4.5 mcg/inh inhalation aerosol: 2 puff(s) inhaled 2 times a day (2017 04:37)      Current Inpatient Medications :    ANTIBIOTICS:   aztreonam  IVPB 500 milliGRAM(s) IV Intermittent every 8 hours  aztreonam  IVPB          OTHER RELEVANT MEDICATIONS :  acetaminophen   Tablet 650 milliGRAM(s) Oral every 6 hours PRN  acetaminophen   Tablet. 650 milliGRAM(s) Oral every 6 hours PRN  ALBUTerol    0.083% 2.5 milliGRAM(s) Nebulizer every 6 hours  buPROPion XL . 150 milliGRAM(s) Oral daily  carbidopa/levodopa  25/100 1 Tablet(s) Oral daily  carvedilol 3.125 milliGRAM(s) Oral every 12 hours  dextrose 5% + sodium chloride 0.45%. 1000 milliLiter(s) IV Continuous <Continuous>  hydrALAZINE 50 milliGRAM(s) Oral every 8 hours  isosorbide   dinitrate Tablet (ISORDIL) 10 milliGRAM(s) Oral three times a day  levothyroxine 100 MICROGram(s) Oral daily  pantoprazole  Injectable 40 milliGRAM(s) IV Push daily      ROS:  Unable to obtain due to : advanced dementia     I&O's Detail    2018 07:  -  2018 07:00  --------------------------------------------------------  IN:    dextrose 5% + sodium chloride 0.45%.: 70 mL  Total IN: 70 mL    OUT:  Total OUT: 0 mL    Total NET: 70 mL      2018 07:  -  2018 11:28  --------------------------------------------------------  IN:    dextrose 5% + sodium chloride 0.45%.: 90 mL  Total IN: 90 mL    OUT:  Total OUT: 0 mL    Total NET: 90 mL          Physical Exam:  Vital Signs Last 24 Hrs  T(C): 36.8 (2018 09:25), Max: 36.8 (2018 09:25)  T(F): 98.2 (2018 09:25), Max: 98.2 (2018 09:25)  HR: 53 (:) (53 - 67)  BP: 128/65 (2018 09:25) (109/78 - 149/60)  BP(mean): --  RR: 18 (2018 09:25) (16 - 20)  SpO2: 90% (:25) (90% - 98%)  Height (cm): 162.56 ( @ 18:32)  Weight (kg): 45.4 ( @ 18:32)  BMI (kg/m2): 17.2 ( @ 18:32)  BSA (m2): 1.46 ( @ 18:32)    General: cachectic , poorly  nourished, in no acute distress  Eyes: sclera anicteric, pupils equal and reactive to light  ENMT: buccal mucosa moist, pharynx not injected  Neck: supple, trachea midline  Lungs: bilateral coarse rales , decreased breath sounds left base   Cardiovascular: regular rate and rhythm, S1 S2, 3/6 KISHORE   Abdomen: soft, nontender, no organomegaly present, bowel sounds normal  Neurological:  confused  Cranial Nerves II-XII grossly intact  Skin: no increased ecchymosis/petechiae/purpura  Lymph Nodes: no palpable cervical/supraclavicular lymph nodes enlargements  Extremities: no cyanosis/clubbing/edema    Labs:                             10.6   10.4   )----------(  159       ( 2018 07:12 )               32.1      139    |  105    |  23     ----------------------------<  113        ( 2018 07:12 )  4.1     |  25     |  1.40     Ca    8.4        ( 2018 07:12 )    TPro  5.6    /  Alb  3.0    /  TBili  1.2    /  DBili  x      /  AST  19     /  ALT  11     /  AlkPhos  102    ( 2018 19:12 )    LIVER FUNCTIONS - ( 2018 19:12 )  Alb: 3.0 g/dL / Pro: 5.6 g/dL / ALK PHOS: 102 U/L / ALT: 11 U/L DA / AST: 19 U/L / GGT: x           PT/INR -  11.6 sec / 1.06 ratio   ( 2018 19:13 )       PTT -  27.2 sec   ( 2018 19:13 )  CAPILLARY BLOOD GLUCOSE        Urinalysis Basic - ( 2018 20:34 )    Color: Yellow / Appearance: Clear / S.015 / pH: x  Gluc: x / Ketone: Negative  / Bili: Negative / Urobili: 4 mg/dL   Blood: x / Protein: 30 mg/dL / Nitrite: Negative   Leuk Esterase: Moderate / RBC: 6-10 /HPF / WBC >50   Sq Epi: x / Non Sq Epi: Few / Bacteria: Negative    Lactate, Blood (18 @ 20:24)    Lactate, Blood: 1.9 mmol/L        RECENT CULTURES:    RADIOLOGY & ADDITIONAL STUDIES:  Xray Abdomen 1 View PORTABLE -Routine (18 @ 10:46) >    The bowel gas pattern is unremarkable.  No pathologic calcifications are   seen.  The osseous structures specific demonstrate degenerative changes   of the spine..  Compression screws are seen within the proximal LEFT hip.   IVC filter is noted. Surgical clips from cholecystectomy.      IMPRESSION:  Nonspecific bowel gas pattern.        Xray Chest 1 View AP/PA (18 @ 19:39) >    Impression:    Mild to moderate bilateral pleural effusions with bibasilar atelectasis   worse as compared to prior exam. Minimal pulmonary vascular congestion    < from: CT Head No Cont (18 @ 19:40) >    IMPRESSION:  Status post PICA aneurysm coiling. No acute intracranial hemorrhage, mass   effect from be degenerative edema or evidence for large vascular   territory infarct. Extensive chronic microvascular change and small right   basal ganglia and cerebellar infarcts. Absence of a prior study may mask   the presence of superimposed white matter lacunar infarcts.    Assessment :   89 yr old with PMH of Dementia, depression, severe aortic stenosis, CHF, CVA, GIB, CLL  admitted with AMS, and  SOB likely CHF exacerbation  Leukocytosis reactive resolved  Doubt pnumonia  or UTI   Hx of colonization with ESBL E coli   CKD  Failure to thrive     Plan :   - DC stool for C diff   - get procalcitonin, pro bnp   - will dc antibiotics   - consider comfort care with failure to thrive     Continue with present regime .  Appropriate use of antibiotics and adverse effects reviewed.      I have discussed the above plan of care with patient's family in detail. They expressed understanding of the treatment plan . Risks, benefits and alternatives discussed in detail. I have asked if they have any questions or concerns and appropriately addressed them to the best of my ability .      > 45 minutes spent in direct patient care reviewing  the notes, lab data/ imaging , discussion with multidisciplinary team. All questions were addressed and answered to the best of my capacity .    Thank you for allowing me to participate in the care of your patient .      Ethan Ochoa MD  [phone] Infectious Diseases Consult by Ethan Ochoa MD    Reason for Consult :    HPI:  89 yr old with PMH of Dementia,depression,severe aortic incompetence ,, CVA- haemorruagic stroke in 2005,severe GI bleed 4 yrs back,, gait disbility following CVA, parkinson hypothyroid, lactose intolerant,allergic to penicillin and contrast dye,,   CHF systolic  diastolic with copd  asymptomatic bacteruria, with h/o ESBL e coli colonization, with  fraility , CKD 3., GERD. She has CLL   patient had thoracentesis on , and it is transudative    brought back for worsening lethargy, poor po intake and diarrhea, although she is reported to be lactose intolerant . Seen by GI . She has not had any BM since admision   IN ED suspected HCAP started on vanc and aztreonam, being admitted for further care         Past Medical & Surgical Hx:  PAST MEDICAL & SURGICAL HISTORY:  CLL (chronic lymphocytic leukemia)  Dementia with behavioral disturbance, unspecified dementia type  Gastrointestinal hemorrhage, unspecified gastrointestinal hemorrhage type  Nontraumatic intracerebral hemorrhage, unspecified cerebral location, unspecified laterality  Rhinitis  Anxiety  GERD (gastroesophageal reflux disease)  Hypothyroidism  Coronary artery disease  COPD (chronic obstructive pulmonary disease)  Closed fracture of neck of left femur, initial encounter      Social History--Retired, resident of [facility]   EtOH: denies   Tobacco: denies   Drug Use: denies       FAMILY HISTORY:      Allergies    IV Contrast (Unknown)  penicillin (Unknown)    Intolerances    Home/ Out patient  Medications :  Home Medications:  bacitracin 500 units/g topical ointment: Apply topically to affected area once a day (2017 04:37)  buPROPion 150 mg/24 hours (XL) oral tablet, extended release: 1 tab(s) orally once a day (2017 04:37)  Calcium 600+D 600 mg-200 intl units oral tablet: 1 tab(s) orally once a day (2017 04:37)  carbidopa-levodopa 25 mg-100 mg oral tablet: 1 tab(s) orally once a day (2017 04:37)  carvedilol 3.125 mg oral tablet: 1 tab(s) orally every 12 hours (2017 04:37)  DuoNeb 0.5 mg-2.5 mg/3 mL inhalation solution: 3 milliliter(s) inhaled 2 times a day (2017 04:37)  fluticasone 50 mcg/inh nasal spray: 1 spray(s) in each nostril once a day (2017 04:37)  isosorbide dinitrate 30 mg oral tablet: 1 tab(s) orally once a day (2017 04:37)  Multiple Vitamins oral tablet: 1 tab(s) orally once a day (2017 04:37)  omeprazole 20 mg oral delayed release capsule: 1 cap(s) orally once a day (2017 04:37)  Symbicort 160 mcg-4.5 mcg/inh inhalation aerosol: 2 puff(s) inhaled 2 times a day (2017 04:37)      Current Inpatient Medications :    ANTIBIOTICS:   aztreonam  IVPB 500 milliGRAM(s) IV Intermittent every 8 hours  aztreonam  IVPB          OTHER RELEVANT MEDICATIONS :  acetaminophen   Tablet 650 milliGRAM(s) Oral every 6 hours PRN  acetaminophen   Tablet. 650 milliGRAM(s) Oral every 6 hours PRN  ALBUTerol    0.083% 2.5 milliGRAM(s) Nebulizer every 6 hours  buPROPion XL . 150 milliGRAM(s) Oral daily  carbidopa/levodopa  25/100 1 Tablet(s) Oral daily  carvedilol 3.125 milliGRAM(s) Oral every 12 hours  dextrose 5% + sodium chloride 0.45%. 1000 milliLiter(s) IV Continuous <Continuous>  hydrALAZINE 50 milliGRAM(s) Oral every 8 hours  isosorbide   dinitrate Tablet (ISORDIL) 10 milliGRAM(s) Oral three times a day  levothyroxine 100 MICROGram(s) Oral daily  pantoprazole  Injectable 40 milliGRAM(s) IV Push daily      ROS:  Unable to obtain due to : advanced dementia     I&O's Detail    2018 07:  -  2018 07:00  --------------------------------------------------------  IN:    dextrose 5% + sodium chloride 0.45%.: 70 mL  Total IN: 70 mL    OUT:  Total OUT: 0 mL    Total NET: 70 mL      2018 07:  -  2018 11:28  --------------------------------------------------------  IN:    dextrose 5% + sodium chloride 0.45%.: 90 mL  Total IN: 90 mL    OUT:  Total OUT: 0 mL    Total NET: 90 mL          Physical Exam:  Vital Signs Last 24 Hrs  T(C): 36.8 (2018 09:25), Max: 36.8 (2018 09:25)  T(F): 98.2 (2018 09:25), Max: 98.2 (2018 09:25)  HR: 53 (:) (53 - 67)  BP: 128/65 (2018 09:25) (109/78 - 149/60)  BP(mean): --  RR: 18 (2018 09:25) (16 - 20)  SpO2: 90% (:25) (90% - 98%)  Height (cm): 162.56 ( @ 18:32)  Weight (kg): 45.4 ( @ 18:32)  BMI (kg/m2): 17.2 ( @ 18:32)  BSA (m2): 1.46 ( @ 18:32)    General: cachectic , poorly  nourished, in no acute distress  Eyes: sclera anicteric, pupils equal and reactive to light  ENMT: buccal mucosa moist, pharynx not injected  Neck: supple, trachea midline  Lungs: bilateral coarse rales , decreased breath sounds left base   Cardiovascular: regular rate and rhythm, S1 S2, 3/6 KISHORE   Abdomen: soft, nontender, no organomegaly present, bowel sounds normal  Neurological:  confused  Cranial Nerves II-XII grossly intact  Skin: no increased ecchymosis/petechiae/purpura  Lymph Nodes: no palpable cervical/supraclavicular lymph nodes enlargements  Extremities: no cyanosis/clubbing/edema    Labs:                             10.6   10.4   )----------(  159       ( 2018 07:12 )               32.1      139    |  105    |  23     ----------------------------<  113        ( 2018 07:12 )  4.1     |  25     |  1.40     Ca    8.4        ( 2018 07:12 )    TPro  5.6    /  Alb  3.0    /  TBili  1.2    /  DBili  x      /  AST  19     /  ALT  11     /  AlkPhos  102    ( 2018 19:12 )    LIVER FUNCTIONS - ( 2018 19:12 )  Alb: 3.0 g/dL / Pro: 5.6 g/dL / ALK PHOS: 102 U/L / ALT: 11 U/L DA / AST: 19 U/L / GGT: x           PT/INR -  11.6 sec / 1.06 ratio   ( 2018 19:13 )       PTT -  27.2 sec   ( 2018 19:13 )  CAPILLARY BLOOD GLUCOSE        Urinalysis Basic - ( 2018 20:34 )    Color: Yellow / Appearance: Clear / S.015 / pH: x  Gluc: x / Ketone: Negative  / Bili: Negative / Urobili: 4 mg/dL   Blood: x / Protein: 30 mg/dL / Nitrite: Negative   Leuk Esterase: Moderate / RBC: 6-10 /HPF / WBC >50   Sq Epi: x / Non Sq Epi: Few / Bacteria: Negative    Lactate, Blood (18 @ 20:24)    Lactate, Blood: 1.9 mmol/L        RECENT CULTURES:    RADIOLOGY & ADDITIONAL STUDIES:  Xray Abdomen 1 View PORTABLE -Routine (18 @ 10:46) >    The bowel gas pattern is unremarkable.  No pathologic calcifications are   seen.  The osseous structures specific demonstrate degenerative changes   of the spine..  Compression screws are seen within the proximal LEFT hip.   IVC filter is noted. Surgical clips from cholecystectomy.      IMPRESSION:  Nonspecific bowel gas pattern.        Xray Chest 1 View AP/PA (18 @ 19:39) >    Impression:    Mild to moderate bilateral pleural effusions with bibasilar atelectasis   worse as compared to prior exam. Minimal pulmonary vascular congestion    < from: CT Head No Cont (18 @ 19:40) >    IMPRESSION:  Status post PICA aneurysm coiling. No acute intracranial hemorrhage, mass   effect from be degenerative edema or evidence for large vascular   territory infarct. Extensive chronic microvascular change and small right   basal ganglia and cerebellar infarcts. Absence of a prior study may mask   the presence of superimposed white matter lacunar infarcts.    Assessment :   89 yr old with PMH of Dementia, depression, severe aortic stenosis, CHF, CVA, GIB, CLL  admitted with AMS, and  SOB likely CHF exacerbation  Leukocytosis reactive resolved  Doubt pnumonia  or UTI   Hx of colonization with ESBL E coli   CKD  Failure to thrive     Plan :   - DC stool for C diff   - get procalcitonin, pro bnp   - will dc antibiotics   - consider comfort care with failure to thrive     Continue with present regime .  Appropriate use of antibiotics and adverse effects reviewed.    Advanced directives noted , She is DNR/DNI     I have discussed the above plan of care with patient's family in detail. They expressed understanding of the treatment plan . Risks, benefits and alternatives discussed in detail. I have asked if they have any questions or concerns and appropriately addressed them to the best of my ability .      > 45 minutes spent in direct patient care reviewing  the notes, lab data/ imaging , discussion with multidisciplinary team. All questions were addressed and answered to the best of my capacity .    Thank you for allowing me to participate in the care of your patient .      Ethan Ochoa MD  [phone]

## 2018-02-18 NOTE — CONSULT NOTE ADULT - ASSESSMENT
89 year old female, history of OMS, Aortic insufficiency, hemorrhagic stroke, GI bleed, brought in for lethargy poor PO intake.  Pneumonia suspected, and antibiotics started.  Tele = mild sinus bradycardia.
IMPRESSIONS:  Diarrhea with mild distention  GERD    RECOMMENDATIONS:  Check AXR - rule out fecal impaction  Stool studies including culture and C. difficile ordered  Add probiotics while on antibiotics  Resume diet if able to swallow    Stephanie Pedersen MD

## 2018-02-18 NOTE — DIETITIAN INITIAL EVALUATION ADULT. - PERTINENT MEDS FT
Azactam, sinemet, Coreg, Hydralazine, Isordil, Synthroid, Lactobacillus Acidophilus IVF's: D5 with 0.45% nacl at 30 ml/hr

## 2018-02-18 NOTE — CONSULT NOTE ADULT - PROBLEM SELECTOR RECOMMENDATION 9
Aortic insufficiency with  normal EF on Echo.  No intervention planned
poss PNA, although doubtful, CXR not helpful, WBC not helpful - pt has CLL  on emp ABX  will order Albuterol for COPD, cannot use inhalers, pt has dementia  at risk for aspiration  HOB elev  oral and skin care  pt has severe valvular heart disease - monitor for volume overload, dec IVF rate, monitor HD and Sat and VS  pt is known to me from prior admissions  pt was DNR on prior visits  pall care eval  asp prec  support and assist with ADL  prognosis poor

## 2018-02-18 NOTE — DIETITIAN INITIAL EVALUATION ADULT. - ETIOLOGY
possibly multifactorial - inadequate oral intake, unable to feed self, possible lack of adequate assistance pta unclear ( possibly due to antibiotics)

## 2018-02-19 DIAGNOSIS — J44.9 CHRONIC OBSTRUCTIVE PULMONARY DISEASE, UNSPECIFIED: ICD-10-CM

## 2018-02-19 LAB
ANION GAP SERPL CALC-SCNC: 12 MMOL/L — SIGNIFICANT CHANGE UP (ref 5–17)
BASE EXCESS BLDA CALC-SCNC: -3.5 MMOL/L — LOW (ref -2–2)
BLOOD GAS SOURCE: SIGNIFICANT CHANGE UP
BUN SERPL-MCNC: 19 MG/DL — SIGNIFICANT CHANGE UP (ref 7–23)
CALCIUM SERPL-MCNC: 8.2 MG/DL — LOW (ref 8.4–10.5)
CHLORIDE SERPL-SCNC: 107 MMOL/L — SIGNIFICANT CHANGE UP (ref 96–108)
CO2 SERPL-SCNC: 23 MMOL/L — SIGNIFICANT CHANGE UP (ref 22–31)
CREAT SERPL-MCNC: 1.35 MG/DL — HIGH (ref 0.5–1.3)
GLUCOSE SERPL-MCNC: 94 MG/DL — SIGNIFICANT CHANGE UP (ref 70–99)
HCO3 BLDA-SCNC: 22 MMOL/L — SIGNIFICANT CHANGE UP (ref 21–29)
HCT VFR BLD CALC: 32.8 % — LOW (ref 34.5–45)
HGB BLD-MCNC: 10.6 G/DL — LOW (ref 11.5–15.5)
HOROWITZ INDEX BLDA+IHG-RTO: 100 — SIGNIFICANT CHANGE UP
MCHC RBC-ENTMCNC: 28.8 PG — SIGNIFICANT CHANGE UP (ref 27–34)
MCHC RBC-ENTMCNC: 32.5 GM/DL — SIGNIFICANT CHANGE UP (ref 32–36)
MCV RBC AUTO: 88.6 FL — SIGNIFICANT CHANGE UP (ref 80–100)
PCO2 BLDA: 35 MMHG — SIGNIFICANT CHANGE UP (ref 32–46)
PH BLD: 7.39 — SIGNIFICANT CHANGE UP (ref 7.35–7.45)
PLATELET # BLD AUTO: 178 K/UL — SIGNIFICANT CHANGE UP (ref 150–400)
PO2 BLDA: 158 MMHG — HIGH (ref 74–108)
POTASSIUM SERPL-MCNC: 3.8 MMOL/L — SIGNIFICANT CHANGE UP (ref 3.5–5.3)
POTASSIUM SERPL-SCNC: 3.8 MMOL/L — SIGNIFICANT CHANGE UP (ref 3.5–5.3)
PROCALCITONIN SERPL-MCNC: 0.44 NG/ML — HIGH (ref 0–0.04)
RBC # BLD: 3.7 M/UL — LOW (ref 3.8–5.2)
RBC # FLD: 15.5 % — HIGH (ref 10.3–14.5)
SAO2 % BLDA: 99 % — HIGH (ref 92–96)
SODIUM SERPL-SCNC: 142 MMOL/L — SIGNIFICANT CHANGE UP (ref 135–145)
WBC # BLD: 11.1 K/UL — HIGH (ref 3.8–10.5)
WBC # FLD AUTO: 11.1 K/UL — HIGH (ref 3.8–10.5)

## 2018-02-19 PROCEDURE — 71045 X-RAY EXAM CHEST 1 VIEW: CPT | Mod: 26

## 2018-02-19 RX ORDER — FUROSEMIDE 40 MG
20 TABLET ORAL ONCE
Qty: 0 | Refills: 0 | Status: COMPLETED | OUTPATIENT
Start: 2018-02-19 | End: 2018-02-19

## 2018-02-19 RX ORDER — BUDESONIDE AND FORMOTEROL FUMARATE DIHYDRATE 160; 4.5 UG/1; UG/1
2 AEROSOL RESPIRATORY (INHALATION)
Qty: 0 | Refills: 0 | Status: DISCONTINUED | OUTPATIENT
Start: 2018-02-19 | End: 2018-02-22

## 2018-02-19 RX ADMIN — ALBUTEROL 2.5 MILLIGRAM(S): 90 AEROSOL, METERED ORAL at 07:26

## 2018-02-19 RX ADMIN — ALBUTEROL 2.5 MILLIGRAM(S): 90 AEROSOL, METERED ORAL at 13:49

## 2018-02-19 RX ADMIN — Medication 40 MILLIGRAM(S): at 07:58

## 2018-02-19 RX ADMIN — Medication 50 MICROGRAM(S): at 06:00

## 2018-02-19 RX ADMIN — PANTOPRAZOLE SODIUM 40 MILLIGRAM(S): 20 TABLET, DELAYED RELEASE ORAL at 11:45

## 2018-02-19 RX ADMIN — Medication 20 MILLIGRAM(S): at 07:58

## 2018-02-19 RX ADMIN — Medication 40 MILLIGRAM(S): at 13:12

## 2018-02-19 RX ADMIN — ALBUTEROL 2.5 MILLIGRAM(S): 90 AEROSOL, METERED ORAL at 19:49

## 2018-02-19 RX ADMIN — Medication 10 MILLIGRAM(S): at 10:00

## 2018-02-19 RX ADMIN — Medication 50 MILLIGRAM(S): at 06:00

## 2018-02-19 RX ADMIN — Medication 40 MILLIGRAM(S): at 21:49

## 2018-02-19 RX ADMIN — Medication 10 MILLIGRAM(S): at 22:22

## 2018-02-19 NOTE — GOALS OF CARE CONVERSATION - PERSONAL ADVANCE DIRECTIVE - NS PRO AD PATIENT TYPE
Do Not Resuscitate (DNR)/Health Care Proxy (HCP)/Living Will Health Care Proxy (HCP)/Medical Orders for Life-Sustaining Treatment (MOLST)/Do Not Resuscitate (DNR)/Living Will

## 2018-02-19 NOTE — PROGRESS NOTE ADULT - ASSESSMENT
89 year old female, history of COPD, Aortic insufficiency, GI bleed, brought in for Pneumonia.  BP elevated today.

## 2018-02-19 NOTE — PROGRESS NOTE ADULT - SUBJECTIVE AND OBJECTIVE BOX
Patient is a 89y old  Female who presents with a chief complaint of AMS/Lethargic (2018 01:56)      INTERVAL HPI/OVERNIGHT EVENTS:no acute event    Home Medications:  bacitracin 500 units/g topical ointment: Apply topically to affected area once a day (2017 04:37)  buPROPion 150 mg/24 hours (XL) oral tablet, extended release: 1 tab(s) orally once a day (2017 04:37)  Calcium 600+D 600 mg-200 intl units oral tablet: 1 tab(s) orally once a day (2017 04:37)  carbidopa-levodopa 25 mg-100 mg oral tablet: 1 tab(s) orally once a day (2017 04:37)  carvedilol 3.125 mg oral tablet: 1 tab(s) orally every 12 hours (2017 04:37)  DuoNeb 0.5 mg-2.5 mg/3 mL inhalation solution: 3 milliliter(s) inhaled 2 times a day (2017 04:37)  fluticasone 50 mcg/inh nasal spray: 1 spray(s) in each nostril once a day (2017 04:37)  isosorbide dinitrate 30 mg oral tablet: 1 tab(s) orally once a day (2017 04:37)  Multiple Vitamins oral tablet: 1 tab(s) orally once a day (2017 04:37)  omeprazole 20 mg oral delayed release capsule: 1 cap(s) orally once a day (2017 04:37)  Symbicort 160 mcg-4.5 mcg/inh inhalation aerosol: 2 puff(s) inhaled 2 times a day (2017 04:37)      MEDICATIONS  (STANDING):  ALBUTerol    0.083% 2.5 milliGRAM(s) Nebulizer every 6 hours  buDESOnide 160 MICROgram(s)/formoterol 4.5 MICROgram(s) Inhaler 2 Puff(s) Inhalation two times a day  levothyroxine Injectable 50 MICROGram(s) IV Push <User Schedule>  methylPREDNISolone sodium succinate Injectable 40 milliGRAM(s) IV Push every 8 hours  pantoprazole  Injectable 40 milliGRAM(s) IV Push daily    MEDICATIONS  (PRN):  acetaminophen  Suppository 650 milliGRAM(s) Rectal every 6 hours PRN For Temp greater than 38 C (100.4 F)  acetaminophen  Suppository. 650 milliGRAM(s) Rectal every 6 hours PRN Moderate Pain (4 - 6)  hydrALAZINE Injectable 10 milliGRAM(s) IV Push every 4 hours PRN htn  metoprolol    tartrate Injectable 5 milliGRAM(s) IV Push every 6 hours PRN htn      Allergies    IV Contrast (Unknown)  penicillin (Unknown)    Intolerances        REVIEW OF SYSTEMS:  unable as pt confused, denies any pain or SOB  Vital Signs Last 24 Hrs  T(C): 36.4 (2018 10:02), Max: 36.5 (2018 21:41)  T(F): 97.6 (2018 10:02), Max: 97.7 (2018 21:41)  HR: 71 (2018 10:) (58 - 80)  BP: 177/83 (2018 10:02) (129/63 - 191/95)  BP(mean): --  RR: 20 (2018 10:02) (17 - 20)  SpO2: 90% (2018 10:02) (90% - 99%)    PHYSICAL EXAM:  GENERAL:lethargic, confused sitting in bed  HEAD:  Atraumatic, Normocephalic  EYES: EOMI, PERRLA, conjunctiva and sclera clear  ENMT: Moist mucous membranes,   NECK: Supple, No JVD, Normal thyroid  NERVOUS SYSTEM: lethargic confused does not follow commands,  Motor Strength 3/5 B/L upper and lower extremities; DTRs 2+ intact and symmetric, needs assistance for transfer  CHEST/LUNG: Clear to percussion bilaterally; No rales, rhonchi, wheezing, or rubs  HEART: Regular rate and rhythm; No murmurs, rubs, or gallops  ABDOMEN: Soft, Nontender, Nondistended; Bowel sounds present  EXTREMITIES:  2+ Peripheral Pulses, No clubbing, cyanosis, or edema  LYMPH: No lymphadenopathy noted  SKIN: No rashes or lesions    LABS:                        10.6   11.1  )-----------( 178      ( 2018 06:56 )             32.8     02-19    142  |  107  |  19  ----------------------------<  94  3.8   |  23  |  1.35<H>    Ca    8.2<L>      2018 06:56    TPro  5.6<L>  /  Alb  3.0<L>  /  TBili  1.2  /  DBili  x   /  AST  19  /  ALT  11  /  AlkPhos  102      PT/INR - ( 2018 19:13 )   PT: 11.6 sec;   INR: 1.06 ratio         PTT - ( 2018 19:13 )  PTT:27.2 sec  Urinalysis Basic - ( 2018 20:34 )    Color: Yellow / Appearance: Clear / S.015 / pH: x  Gluc: x / Ketone: Negative  / Bili: Negative / Urobili: 4 mg/dL   Blood: x / Protein: 30 mg/dL / Nitrite: Negative   Leuk Esterase: Moderate / RBC: 6-10 /HPF / WBC >50   Sq Epi: x / Non Sq Epi: Few / Bacteria: Negative      CAPILLARY BLOOD GLUCOSE            Culture - Blood (collected 18 @ 01:06)  Source: .Blood Blood-Peripheral  Preliminary Report (18 @ 02:02):    No growth to date.    Culture - Blood (collected 18 @ 01:06)  Source: .Blood Blood-Peripheral  Preliminary Report (18 @ 02:02):    No growth to date.    Culture - Urine (collected 18 @ 22:14)  Source: .Urine Catheterized  Final Report (18 @ 23:24):    No growth  < from: Xray Chest 1 View-PORTABLE IMMEDIATE (18 @ 08:10) >  rontal expiratory view of the chest shows the heart to be similar in   size. Pulmonary vascularity shows more congestion. The lungs show   progression of pleural effusions and there is no evidence of pneumothorax.    < end of copied text >        RADIOLOGY & ADDITIONAL TESTS:    Imaging Personally Reviewed:  [x ] YES  [ ] NO    Consultant(s) Notes Reviewed:  [x ] YES  [ ] NO    Care Discussed with Consultants/Other Providers [ x] YES  [ ] NO

## 2018-02-19 NOTE — GOALS OF CARE CONVERSATION - PERSONAL ADVANCE DIRECTIVE - NS PRO AD PATIENT TYPE ON CHART
Health Care Proxy (HCP)/Living Will Health Care Proxy (HCP)/Medical Orders for Life-Sustaining Treatment (MOLST)/Living Will

## 2018-02-19 NOTE — GOALS OF CARE CONVERSATION - PERSONAL ADVANCE DIRECTIVE - CONVERSATION DETAILS
reviewed MOLST with pts HCP Spoke to pts HCP Hiren sanabria his sister Niecy regarding pts condition and prognosis. She is unable to swallow without apirating. She is NPO and family knows she would not want a PEG for [place]. We discussed comfort care and Hospice . They agreed to same.

## 2018-02-19 NOTE — PROGRESS NOTE ADULT - SUBJECTIVE AND OBJECTIVE BOX
LATOSHA MOONEY is a 89yFemale , patient examined and chart reviewed. Patient seen and examined today being followed for AMS and leukocytosis      INTERVAL HPI/ OVERNIGHT EVENTS: Events noted, feels much better , has elevated BP and SOB     PAST MEDICAL & SURGICAL HISTORY:  CLL (chronic lymphocytic leukemia)  Dementia with behavioral disturbance, unspecified dementia type  Gastrointestinal hemorrhage, unspecified gastrointestinal hemorrhage type  Nontraumatic intracerebral hemorrhage, unspecified cerebral location, unspecified laterality  Rhinitis  Anxiety  GERD (gastroesophageal reflux disease)  Hypothyroidism  Coronary artery disease  COPD (chronic obstructive pulmonary disease)  Closed fracture of neck of left femur, initial encounter      For details regarding the patient's social history, family history, and other miscellaneous elements, please refer the initial infectious diseases consultation and/or the admitting history and physical examination for this admission.    ROS:  CONSTITUTIONAL:  Negative fever or chills, feels well, good appetite  EYES:  Negative  blurry vision or double vision  CARDIOVASCULAR:  Negative for chest pain or palpitations  RESPIRATORY:  Pos for  SOB   GASTROINTESTINAL:  Negative for nausea, vomiting, diarrhea, constipation, or abdominal pain  GENITOURINARY:  Negative frequency, urgency or dysuria  NEUROLOGIC:  No headache, confusion, dizziness, lightheadedness  All other systems were reviewed and are negative     Allergies:  IV Contrast (Unknown)  penicillin (Unknown)      Current inpatient medications :    ANTIBIOTICS/RELEVANT:  aztreonam  IVPB 500 milliGRAM(s) IV Intermittent every 8 hours  aztreonam  IVPB          acetaminophen  Suppository 650 milliGRAM(s) Rectal every 6 hours PRN  acetaminophen  Suppository. 650 milliGRAM(s) Rectal every 6 hours PRN  ALBUTerol    0.083% 2.5 milliGRAM(s) Nebulizer every 6 hours  buDESOnide 160 MICROgram(s)/formoterol 4.5 MICROgram(s) Inhaler 2 Puff(s) Inhalation two times a day  dextrose 5% + sodium chloride 0.45%. 1000 milliLiter(s) IV Continuous <Continuous>  hydrALAZINE Injectable 10 milliGRAM(s) IV Push every 4 hours PRN  levothyroxine Injectable 50 MICROGram(s) IV Push <User Schedule>  methylPREDNISolone sodium succinate Injectable 40 milliGRAM(s) IV Push every 8 hours  metoprolol    tartrate Injectable 5 milliGRAM(s) IV Push every 6 hours PRN  pantoprazole  Injectable 40 milliGRAM(s) IV Push daily      Objective:     @ 07: @ 07:00  --------------------------------------------------------  IN: 330 mL / OUT: 0 mL / NET: 330 mL      T(C): 36.4 (18 @ 05:10), Max: 36.5 (18 @ 21:41)  HR: 74 (18 @ 07:42) (58 - 80)  BP: 180/73 (18 @ 07:42) (129/63 - 191/95)  RR: 20 (18 @ 05:10) (17 - 20)  SpO2: 99% (18 @ 07:26) (90% - 99%)  Wt(kg): --      Physical Exam:  General: well developed well nourished, in no acute distress  Eyes: sclera anicteric, pupils equal and reactive to light  ENMT: buccal mucosa moist, pharynx not injected  Neck: supple, trachea midline  Lungs: bibasilar rales ++  Cardiovascular: regular rate and rhythm, S1 S2  Abdomen: soft, nontender, no organomegaly present, bowel sounds normal  Neurological: alert and oriented x3, Cranial Nerves II-XII grossly intact  Skin: no increased ecchymosis/petechiae/purpura  Lymph Nodes: no palpable cervical/supraclavicular lymph nodes enlargements  Extremities: no cyanosis/clubbing/edema            LABS:                          10.6   11.1  )-----------( 178      ( 2018 06:56 )             32.8           142  |  107  |  19  ----------------------------<  94  3.8   |  23  |  1.35<H>    Ca    8.2<L>      2018 06:56    TPro  5.6<L>  /  Alb  3.0<L>  /  TBili  1.2  /  DBili  x   /  AST  19  /  ALT  11  /  AlkPhos  102        PT/INR - ( 2018 19:13 )   PT: 11.6 sec;   INR: 1.06 ratio         PTT - ( 2018 19:13 )  PTT:27.2 sec  Urinalysis Basic - ( 2018 20:34 )    Color: Yellow / Appearance: Clear / S.015 / pH: x  Gluc: x / Ketone: Negative  / Bili: Negative / Urobili: 4 mg/dL   Blood: x / Protein: 30 mg/dL / Nitrite: Negative   Leuk Esterase: Moderate / RBC: 6-10 /HPF / WBC >50   Sq Epi: x / Non Sq Epi: Few / Bacteria: Negative        ABG - ( 2018 07:34 )  pH: 7.39  /  pCO2: 35    /  pO2: 158   / HCO3: 22    / Base Excess: -3.5  /  SaO2: 99          Procalcitonin, Serum (18 @ 05:14)    Procalcitonin, Serum: 0.44:    C-Reactive Protein, Serum (18 @ 12:18)    C-Reactive Protein, Serum: 5.90 mg/dL    Serum Pro-Brain Natriuretic Peptide (18 @ 11:46)    Serum Pro-Brain Natriuretic Peptide: 14110 pg/mL            RECENT CULTURES:    Culture - Blood (collected 2018 01:06)  Source: .Blood Blood-Peripheral  Preliminary Report (2018 02:02):    No growth to date.    Culture - Blood (collected 2018 01:06)  Source: .Blood Blood-Peripheral  Preliminary Report (2018 02:02):    No growth to date.    Culture - Urine (collected 2018 22:14)  Source: .Urine Catheterized  Final Report (2018 23:24):    No growth        RADIOLOGY & ADDITIONAL STUDIES:  Xray Chest 1 View-PORTABLE IMMEDIATE (18 @ 08:10) >    Frontal expiratory view of the chest shows the heart to be similar in   size. Pulmonary vascularity shows more congestion. The lungs show   progression of pleural effusions and there is no evidence of pneumothorax.    IMPRESSION:  Congestive changes as described.    Assessment :  89 yr old with PMH of Dementia, depression, severe aortic stenosis, CHF, CVA, GIB, CLL  admitted with AMS, and  SOB likely CHF exacerbation  Leukocytosis reactive resolved  Doubt pnumonia  or UTI   Hx of colonization with ESBL E coli   CKD  Failure to thrive     Plan :   - consider taper and dc steroids   - needs diuretics   - will dc antibiotics as all cs negative   - consider comfort care with failure to thrive     Continue with present regime .  Appropriate use of antibiotics and adverse effects reviewed.    Advanced directives noted , She is DNR/DNI     I have discussed the above plan of care with patient and her  family in detail. They expressed understanding of the  treatment plan . Risks, benefits and alternatives discussed in detail. I have asked if they have any questions or concerns and appropriately addressed them to the best of my ability .    > 35 minutes were spent in direct patient care reviewing notes, medications ,labs data/ imaging , discussion with multidisciplinary team.    Thank you for allowing me to participate in care of your patient .    Ethan Ochoa MD  [phone]

## 2018-02-19 NOTE — GOALS OF CARE CONVERSATION - PERSONAL ADVANCE DIRECTIVE - TREATMENT GUIDELINES
no intubation/DNR Order/Antibiotic trial/IV fluid trial DNR Order/Do not re-hospitalize/No artificial nutrition/Antibiotic trial/No IV fluids/no intubation/Comfort measures only/No blood draws

## 2018-02-19 NOTE — PROGRESS NOTE ADULT - SUBJECTIVE AND OBJECTIVE BOX
PULMONARY/CRITICAL CARE      INTERVAL HPI/OVERNIGHT EVENTS:    89y FemaleHPI: Pt more sob, wheezing this am--on NRB.  89 yr old with PMH of Dementia,depression,severe aortic incompetence ,, CVA- haemorrhagic stroke in 2005,severe GI bleed 4 yrs back,, gait disbility following CVA, parkinsonism hypothyroid, lactose intolerant,allergic to penicillin and contrast dye,,   CHF systolic  diastolic with copd  asymptomatic bacteruria, with h/o ESBL e coli colonization, with  fraility , CKD 3., GERD.   patient had thoracentesis on , and it is transudative    brought back for woesening lethargy, poor po intake diarrhea,  IN ED suspected HCAP started on vanc and aztreonam, being admitted for further care (2018 21:15)        PAST MEDICAL & SURGICAL HISTORY:  CLL (chronic lymphocytic leukemia)  Dementia with behavioral disturbance, unspecified dementia type  Gastrointestinal hemorrhage, unspecified gastrointestinal hemorrhage type  Nontraumatic intracerebral hemorrhage, unspecified cerebral location, unspecified laterality  Rhinitis  Anxiety  GERD (gastroesophageal reflux disease)  Hypothyroidism  Coronary artery disease  COPD (chronic obstructive pulmonary disease)  Closed fracture of neck of left femur, initial encounter        ICU Vital Signs Last 24 Hrs  T(C): 36.4 (2018 05:10), Max: 36.8 (2018 09:25)  T(F): 97.5 (2018 05:10), Max: 98.2 (2018 09:25)  HR: 80 (2018 05:10) (53 - 80)  BP: 191/95 (2018 05:10) (128/65 - 191/95)  BP(mean): --  ABP: --  ABP(mean): --  RR: 20 (2018 05:10) (17 - 20)  SpO2: 95% (2018 05:10) (90% - 95%)    Qtts:     I&O's Summary    2018 07:01  -  2018 07:00  --------------------------------------------------------  IN: 330 mL / OUT: 0 mL / NET: 330 mL            REVIEW OF SYSTEMS:    CONSTITUTIONAL: No fever, weight loss, or fatigue  EYES: No eye pain, visual disturbances, or discharge  ENMT:  No difficulty hearing, tinnitus, vertigo; No sinus or throat pain  NECK: No pain or stiffness  BREASTS: No pain, masses, or nipple discharge  RESPIRATORY: has cough, wheezing, no chills or hemoptysis; has shortness of breath  CARDIOVASCULAR: No chest pain, palpitations, dizziness, or leg swelling  GASTROINTESTINAL: No abdominal or epigastric pain. No nausea, vomiting, or hematemesis; No diarrhea or constipation. No melena or hematochezia.  GENITOURINARY: No dysuria, frequency, hematuria, or incontinence  NEUROLOGICAL: No headaches,  numbness, or tremors  SKIN: No itching, burning, rashes, or lesions   LYMPH NODES: No enlarged glands  ENDOCRINE: No heat or cold intolerance; No hair loss  MUSCULOSKELETAL: No joint pain or swelling; No muscle, back, or extremity pain, no calf tenderness  PSYCHIATRIC: No depression, anxiety, mood swings, or difficulty sleeping  HEME/LYMPH: No easy bruising, or bleeding gums  ALLERGY AND IMMUNOLOGIC: No hives or eczema      PHYSICAL EXAM:    GENERAL: mild resp distress, thin female, well-groomed, well-developed,   HEAD:  Atraumatic, Normocephalic  EYES: EOMI, PERRLA, conjunctiva and sclera clear  ENMT: No tonsillar erythema, exudates, or enlargement; Moist mucous membranes, Good dentition, No lesions  NECK: Supple, No JVD, Normal thyroid  NERVOUS SYSTEM:  Alert confused Motor Strength 5/5 B/L upper and lower extremities  CHEST/LUNG:few bilat, rhonchi, wheezing, good excursion, no rubs  HEART: Regular rate and rhythm; No murmurs, rubs, or gallops  ABDOMEN: Soft, Nontender, Nondistended; Bowel sounds present  EXTREMITIES:  2+ Peripheral Pulses, No clubbing, cyanosis, or edema  LYMPH: No lymphadenopathy noted  SKIN: No rashes or lesions        LABS:                        10.6   10.4  )-----------( 159      ( 2018 07:12 )             32.1     02-18    139  |  105  |  23  ----------------------------<  113<H>  4.1   |  25  |  1.40<H>    Ca    8.4      2018 07:12    TPro  5.6<L>  /  Alb  3.0<L>  /  TBili  1.2  /  DBili  x   /  AST  19  /  ALT  11  /  AlkPhos  102  02-    PT/INR - ( 2018 19:13 )   PT: 11.6 sec;   INR: 1.06 ratio         PTT - ( 2018 19:13 )  PTT:27.2 sec  Urinalysis Basic - ( 2018 20:34 )    Color: Yellow / Appearance: Clear / S.015 / pH: x  Gluc: x / Ketone: Negative  / Bili: Negative / Urobili: 4 mg/dL   Blood: x / Protein: 30 mg/dL / Nitrite: Negative   Leuk Esterase: Moderate / RBC: 6-10 /HPF / WBC >50   Sq Epi: x / Non Sq Epi: Few / Bacteria: Negative        vanco through     RADIOLOGY & ADDITIONAL STUDIES:< from: Xray Chest 1 View AP/PA (18 @ 19:39) >  EXAM:  XR CHEST AP OR PA 1V                                  PROCEDURE DATE:  2018          INTERPRETATION:  Exam Date: 2018 7:39 PM    History: Unresponsive    Technique: 2 frontal views of the chest with comparison to  2017    Findings:    Heart is enlarged. Mild to moderate bilateral pleural effusions with   bibasilar atelectasis worse as compared to prior exam. Minimal pulmonary   vascular congestion. The apices are unremarkable. Degenerative changes of   the visualized osseous structures.        Impression:    Mild to moderate bilateral pleural effusions with bibasilar atelectasis   worse as compared to prior exam. Minimal pulmonary vascular congestion      < end of copied text >        CRITICAL CARE TIME SPENT:

## 2018-02-19 NOTE — PHYSICAL THERAPY INITIAL EVALUATION ADULT - TRANSFER SAFETY CONCERNS NOTED: SIT/STAND, REHAB EVAL
decreased safety awareness/decreased step length/retropulsion
Breathing spontaneous and unlabored. Breath sounds clear and equal bilaterally with regular rhythm.

## 2018-02-19 NOTE — PROGRESS NOTE ADULT - SUBJECTIVE AND OBJECTIVE BOX
The patient is a 89 year-old woman with past medical history of dementia, depression, severe aortic incompetence, CVA with gait instability, severe GI bleed, Parkinson's Disease, hypothyroidism, CHF systolic/diastolic (patient had thoracentesis on 11/20), COPD, asymptomatic bacteruria, with h/o ESBL E coli colonization, CKD 3, and GERD who was admitted yesterday with lethargy, decreased oral intake, and diarrhea.  Gastroenterology is consulted for diarrhea.     Overnight  as per nurse no diarrhea, no rectal bleeding, no bm       MEDICATIONS  (STANDING):  ALBUTerol    0.083% 2.5 milliGRAM(s) Nebulizer every 6 hours  aztreonam  IVPB 500 milliGRAM(s) IV Intermittent every 8 hours    buPROPion XL . 150 milliGRAM(s) Oral daily  carbidopa/levodopa  25/100 1 Tablet(s) Oral daily  carvedilol 3.125 milliGRAM(s) Oral every 12 hours  hydrALAZINE 50 milliGRAM(s) Oral every 8 hours  isosorbide   dinitrate Tablet (ISORDIL) 10 milliGRAM(s) Oral three times a day  lactobacillus acidophilus 1 Tablet(s) Oral daily  levothyroxine 100 MICROGram(s) Oral daily  pantoprazole  Injectable 40 milliGRAM(s) IV Push daily  solumedrol 40 mg q 8    Allergies:  IV Contrast (Unknown)  penicillin (Unknown)      T, P, R, SpO2, BP    Temperature  Temp (F): 97.5 Degrees F  Temp (C) Temp (C): 36.4 Degrees C  Temp site Temp Site: axillary    Heart Rate  Heart Rate Heart Rate (beats/min): 80 /min  Heart Rate Method: noninvasive blood pressure monitor    Noninvasive Blood Pressure  BP Systolic Systolic: 191 mm Hg  BP Diastolic Diastolic (mm Hg): 95 mm Hg  Blood Pressure - Site Site: right upper arm  Blood Pressure - Method Method: electronic    Respiratory/Pulse Oximetry  Respiration Rate (breaths/min) Respiration Rate (breaths/min): 20 /min  SpO2 (%) SpO2 (%): 95 %  O2 delivery Patient On: supplemental O2          PHYSICAL EXAM:    GENERAL: lethargic, receiving breathing treatment   HEAD:  Atraumatic, + temporal wasting  EYES: Anicteric sclera  CHEST/LUNG: Decreased BS, with ex wheeze  HEART: Regular rate and rhythm  ABDOMEN: Soft, Non-tender, Mildly distended  EXTREMITIES:  No edema  SKIN: No jaundice    LABS:   pending  2/18 labs              10.6   10.4  )-----------( 159      ( 18 Feb 2018 07:12 )             32.1     02-18    139  |  105  |  23  ----------------------------<  113<H>  4.1   |  25  |  1.40<H>    Ca    8.4      18 Feb 2018 07:12    TPro  5.6<L>  /  Alb  3.0<L>  /  TBili  1.2  /  DBili  x   /  AST  19  /  ALT  11  /  AlkPhos  102  02-17    PT/INR - ( 17 Feb 2018 19:13 )   PT: 11.6 sec;   INR: 1.06 ratio             Assessment and Recommendation:   · Assessment		  IMPRESSIONS:  Diarrhea with mild distention- caesar specific gas pattern on x ray   GERD    RECOMMENDATIONS:    Stool studies including culture and C. difficile ordered but nothing sent no bm   probiotics while on antibiotics  protonx daily   Resume diet if able to swallow

## 2018-02-19 NOTE — SWALLOW BEDSIDE ASSESSMENT ADULT - PHARYNGEAL PHASE
Wet vocal quality post oral intake/Cough post oral intake/Delayed pharyngeal swallow/Decreased laryngeal elevation Delayed pharyngeal swallow/Decreased laryngeal elevation/Cough post oral intake

## 2018-02-19 NOTE — PROGRESS NOTE ADULT - ASSESSMENT
89 yr old with PMH of Dementia,depression,severe aortic incompetence ,, CVA- haemorruagic stroke in 2005,severe GI bleed 4 yrs back,, gait disbility following CVA, parkinson hypothyroid, lactose intolerant,allergic to penicillin and contrast dye,,   CHF systolic  diastolic with copd  asymptomatic bacteruria, with h/o ESBL e coli colonization, with  fraility , CKD 3., GERD.   patient had thoracentesis on 11/20, and it is transudative    brought back for woesening lethargy, poor po intake diarrhea,altered mental state likely encephalopathy with advanced dementia dehydration chavo ckd  IN ED suspected HCAP started on vanc and aztreonam, being admitted for further care   from: US Transthoracic Echocardiogram w/Doppler Complete (10.02.17 @ 09:05) >   Left ventricular hypertrophy with normal EF  2. Biatrial enlargement   3. aortic sclerosis moderate to  severe AI   4. mild Tricuspid regurgitation with RVSP 31 mm hg .  5. Moderate mitral regurgitation.  patient has AMS with metabolic encephalopathy with dehydration CHAVO with ckd3 with dehydration with poor PO intake with advanced dementia with behavioural disorder with valvular heart disease with diastolic heart failure with CLL, anemia of ch disease with diarrhea possible impaction, hypothermia, h/o CVA, esbl colonization, possible PNa, r/o sepsis   continue current care.   d/w son, pt DNR, prognosis poor   palliative care d/w son HCPcardio and Id consult    ID consult noted  Leukocytosis reactive resolved  Doubt pnumonia  or UTI   Hx of colonization with ESBL E coli

## 2018-02-19 NOTE — PROGRESS NOTE ADULT - SUBJECTIVE AND OBJECTIVE BOX
Patient is a 89y Female with a known history of :  COPD (chronic obstructive pulmonary disease) (J44.9)  Sinus bradycardia (R00.1)  Valvular heart disease (I38)  Diarrhea, unspecified type (R19.7)  Gastroesophageal reflux disease without esophagitis (K21.9)  Hypothyroidism, unspecified type (E03.9)  CLL (chronic lymphocytic leukemia) (C91.10)  Dementia with behavioral disturbance, unspecified dementia type (F03.91)  HTN (hypertension), benign (I10)  Coronary artery disease involving native coronary artery of native heart without angina pectoris (I25.10)  PNA (pneumonia) (J18.9)    HPI:  89 yr old with PMH of Dementia,depression,severe aortic incompetence ,, CVA- haemorruagic stroke in 2005,severe GI bleed 4 yrs back,, gait disbility following CVA, parkinson hypothyroid, lactose intolerant,allergic to penicillin and contrast dye,,   CHF systolic  diastolic with copd  asymptomatic bacteruria, with h/o ESBL e coli colonization, with  fraility , CKD 3., GERD.   patient had thoracentesis on 11/20, and it is transudative    brought back for woesening lethargy, poor po intake diarrhea,  IN ED suspected HCAP started on vanc and aztreonam, being admitted for further care (17 Feb 2018 21:15)        MEDICATIONS  (STANDING):  ALBUTerol    0.083% 2.5 milliGRAM(s) Nebulizer every 6 hours  aztreonam  IVPB 500 milliGRAM(s) IV Intermittent every 8 hours  aztreonam  IVPB      buDESOnide 160 MICROgram(s)/formoterol 4.5 MICROgram(s) Inhaler 2 Puff(s) Inhalation two times a day  dextrose 5% + sodium chloride 0.45%. 1000 milliLiter(s) (30 mL/Hr) IV Continuous <Continuous>  levothyroxine Injectable 50 MICROGram(s) IV Push <User Schedule>  methylPREDNISolone sodium succinate Injectable 40 milliGRAM(s) IV Push every 8 hours  pantoprazole  Injectable 40 milliGRAM(s) IV Push daily    MEDICATIONS  (PRN):  acetaminophen  Suppository 650 milliGRAM(s) Rectal every 6 hours PRN For Temp greater than 38 C (100.4 F)  acetaminophen  Suppository. 650 milliGRAM(s) Rectal every 6 hours PRN Moderate Pain (4 - 6)  hydrALAZINE Injectable 10 milliGRAM(s) IV Push every 4 hours PRN htn  metoprolol    tartrate Injectable 5 milliGRAM(s) IV Push every 6 hours PRN htn      ALLERGIES: IV Contrast (Unknown)  penicillin (Unknown)      FAMILY HISTORY:      Social history:  Alochol:   Smoking:   Drug Use:   Marital Status:     PHYSICAL EXAMINATION:  -----------------------------  T(C): 36.4 (02-19-18 @ 05:10), Max: 36.8 (02-18-18 @ 09:25)  HR: 74 (02-19-18 @ 07:42) (53 - 80)  BP: 180/73 (02-19-18 @ 07:42) (128/65 - 191/95)  RR: 20 (02-19-18 @ 05:10) (17 - 20)  SpO2: 99% (02-19-18 @ 07:26) (90% - 99%)  Wt(kg): --    02-18 @ 07:01  -  02-19 @ 07:00  --------------------------------------------------------  IN:    dextrose 5% + sodium chloride 0.45%.: 330 mL  Total IN: 330 mL    OUT:  Total OUT: 0 mL    Total NET: 330 mL            Constitutional: frail.  In no distress  Eyes: the conjunctiva exhibited no abnormalities and the eyelids demonstrated no xanthelasmas.   HEENT: normal oral mucosa, no oral pallor and no oral cyanosis.   Neck: normal jugular venous A waves present, normal jugular venous V waves present and no jugular venous leong A waves.   Pulmonary: fair air flow  Cardiovascular: heart rate and rhythm were normal, normal S1 and S2 distant   Musculoskeletal: the gait could not be assessed..   Extremities: no clubbing of the fingernails, no localized cyanosis, no petechial hemorrhages and no ischemic changes.   Skin: normal skin color and pigmentation, no rash, no venous stasis, no skin lesions, no skin ulcer and no xanthoma was observed.   Psychiatric: mildly confused    LABS:   --------  CBC Full  -  ( 19 Feb 2018 06:56 )  WBC Count : 11.1 K/uL  Hemoglobin : 10.6 g/dL  Hematocrit : 32.8 %  Platelet Count - Automated : 178 K/uL  Mean Cell Volume : 88.6 fl  Mean Cell Hemoglobin : 28.8 pg  Mean Cell Hemoglobin Concentration : 32.5 gm/dL  Auto Neutrophil # : x  Auto Lymphocyte # : x  Auto Monocyte # : x  Auto Eosinophil # : x  Auto Basophil # : x  Auto Neutrophil % : x  Auto Lymphocyte % : x  Auto Monocyte % : x  Auto Eosinophil % : x  Auto Basophil % : x      02-19    142  |  107  |  19  ----------------------------<  94  3.8   |  23  |  1.35<H>    Ca    8.2<L>      19 Feb 2018 06:56    TPro  5.6<L>  /  Alb  3.0<L>  /  TBili  1.2  /  DBili  x   /  AST  19  /  ALT  11  /  AlkPhos  102  02-17       PT/INR - ( 17 Feb 2018 19:13 )   PT: 11.6 sec;   INR: 1.06 ratio         PTT - ( 17 Feb 2018 19:13 )  PTT:27.2 sec  02-18 @ 11:46 BNP: 22471 pg/mL      129 mg/dL, 61 mg/dL, 48 mg/dL, 101 mg/dL    Culture Results:   No growth to date. (02-18 @ 01:06)  Culture Results:   No growth to date. (02-18 @ 01:06)  Culture Results:   No growth (02-17 @ 22:14)    02-18 @ 01:06    Organism --   Gram Stain Blood -- Gram Stain --  Specimen Source .Blood Blood-Peripheral  Culture-Blood --    02-17 @ 22:14    Organism --   Gram Stain Blood -- Gram Stain --  Specimen Source .Urine Catheterized  Culture-Blood --    2/19/2018:  On Tele = NSR  B/P elevated last few readings.    Radiology:

## 2018-02-19 NOTE — SWALLOW BEDSIDE ASSESSMENT ADULT - COMMENTS
Pt alert, cooperative, confused. Pt admitted with pna, decreased PO intake; PMH significant for CVA, dementia, CHF. Pt's son reported pt had hx of dysphagia when she had CVA. Pt currently presents with oropharyngeal dysphagia characterized by adequate oral prep, adequate formation of the bolus, latent AP transport, swallow delay, reduced laryngeal elevation. Overt s/s of aspiration for all PO. Recommend NPO. Discussed in depth with pt's son, RN.

## 2018-02-20 DIAGNOSIS — J96.01 ACUTE RESPIRATORY FAILURE WITH HYPOXIA: ICD-10-CM

## 2018-02-20 DIAGNOSIS — R13.12 DYSPHAGIA, OROPHARYNGEAL PHASE: ICD-10-CM

## 2018-02-20 LAB
ANION GAP SERPL CALC-SCNC: 8 MMOL/L — SIGNIFICANT CHANGE UP (ref 5–17)
BUN SERPL-MCNC: 23 MG/DL — SIGNIFICANT CHANGE UP (ref 7–23)
CALCIUM SERPL-MCNC: 7.9 MG/DL — LOW (ref 8.4–10.5)
CHLORIDE SERPL-SCNC: 108 MMOL/L — SIGNIFICANT CHANGE UP (ref 96–108)
CO2 SERPL-SCNC: 26 MMOL/L — SIGNIFICANT CHANGE UP (ref 22–31)
CREAT SERPL-MCNC: 1.32 MG/DL — HIGH (ref 0.5–1.3)
GLUCOSE SERPL-MCNC: 107 MG/DL — HIGH (ref 70–99)
HCT VFR BLD CALC: 32.4 % — LOW (ref 34.5–45)
HGB BLD-MCNC: 10.5 G/DL — LOW (ref 11.5–15.5)
MCHC RBC-ENTMCNC: 28.7 PG — SIGNIFICANT CHANGE UP (ref 27–34)
MCHC RBC-ENTMCNC: 32.4 GM/DL — SIGNIFICANT CHANGE UP (ref 32–36)
MCV RBC AUTO: 88.7 FL — SIGNIFICANT CHANGE UP (ref 80–100)
PLATELET # BLD AUTO: 189 K/UL — SIGNIFICANT CHANGE UP (ref 150–400)
POTASSIUM SERPL-MCNC: 3.8 MMOL/L — SIGNIFICANT CHANGE UP (ref 3.5–5.3)
POTASSIUM SERPL-SCNC: 3.8 MMOL/L — SIGNIFICANT CHANGE UP (ref 3.5–5.3)
RBC # BLD: 3.65 M/UL — LOW (ref 3.8–5.2)
RBC # FLD: 15.8 % — HIGH (ref 10.3–14.5)
SODIUM SERPL-SCNC: 142 MMOL/L — SIGNIFICANT CHANGE UP (ref 135–145)
WBC # BLD: 13.9 K/UL — HIGH (ref 3.8–10.5)
WBC # FLD AUTO: 13.9 K/UL — HIGH (ref 3.8–10.5)

## 2018-02-20 RX ADMIN — Medication 40 MILLIGRAM(S): at 17:19

## 2018-02-20 RX ADMIN — Medication 10 MILLIGRAM(S): at 14:45

## 2018-02-20 RX ADMIN — Medication 50 MICROGRAM(S): at 06:58

## 2018-02-20 RX ADMIN — Medication 40 MILLIGRAM(S): at 06:58

## 2018-02-20 RX ADMIN — ALBUTEROL 2.5 MILLIGRAM(S): 90 AEROSOL, METERED ORAL at 18:39

## 2018-02-20 RX ADMIN — ALBUTEROL 2.5 MILLIGRAM(S): 90 AEROSOL, METERED ORAL at 07:17

## 2018-02-20 RX ADMIN — ALBUTEROL 2.5 MILLIGRAM(S): 90 AEROSOL, METERED ORAL at 13:41

## 2018-02-20 RX ADMIN — PANTOPRAZOLE SODIUM 40 MILLIGRAM(S): 20 TABLET, DELAYED RELEASE ORAL at 11:27

## 2018-02-20 NOTE — PROGRESS NOTE ADULT - ASSESSMENT
89 yr old with PMH of Dementia,depression,severe aortic incompetence ,, CVA- haemorruagic stroke in 2005,severe GI bleed 4 yrs back,, gait disbility following CVA, parkinson hypothyroid, lactose intolerant,allergic to penicillin and contrast dye,,   CHF systolic  diastolic with copd  asymptomatic bacteruria, with h/o ESBL e coli colonization, with  fraility , CKD 3., GERD.   patient had thoracentesis on 11/20, and it is transudative    brought back for woesening lethargy, poor po intake diarrhea,altered mental state likely encephalopathy with advanced dementia dehydration chavo ckd  IN ED suspected HCAP started on vanc and aztreonam, being admitted for further care   from: US Transthoracic Echocardiogram w/Doppler Complete (10.02.17 @ 09:05) >   Left ventricular hypertrophy with normal EF  2. Biatrial enlargement   3. aortic sclerosis moderate to  severe AI   4. mild Tricuspid regurgitation with RVSP 31 mm hg .  5. Moderate mitral regurgitation.  patient has AMS with metabolic encephalopathy with dehydration CHAVO with ckd3 with dehydration with poor PO intake with advanced dementia with behavioural disorder with valvular heart disease with diastolic heart failure with CLL, anemia of ch disease with diarrhea possible impaction, hypothermia, h/o CVA, esbl colonization, possible PNa, r/o sepsis   continue current care.   d/w son, pt DNR, prognosis poor   palliative care d/w son HCPcardio and Id consult    ID consult noted  Leukocytosis reactive resolved  Doubt pnumonia  or UTI   Hx of colonization with ESBL E coli   pt has AMS with advanced dementia,with dysphagia, with metabolic encephalopathy with chavo CKD 3, with dehdration with poor po intake, valvular heart disease , cll, anmei of ch disease, uti sepsis ruled out, palliative care d/w son. steroids tapered, pulmonary edema when receives Iv,acute respiratory failure with COPd possible PNa possible recurrent aspiration, ,cad . 89 yr old with PMH of Dementia,depression,severe aortic incompetence ,, CVA- haemorruagic stroke in 2005,severe GI bleed 4 yrs back,, gait disbility following CVA, parkinson hypothyroid, lactose intolerant,allergic to penicillin and contrast dye,,   CHF systolic  diastolic with copd  asymptomatic bacteruria, with h/o ESBL e coli colonization, with  fraility , CKD 3., GERD.   patient had thoracentesis on 11/20, and it is transudative    brought back for woesening lethargy, poor po intake diarrhea,altered mental state likely encephalopathy with advanced dementia dehydration chavo ckd  IN ED suspected HCAP started on vanc and aztreonam, being admitted for further care   from: US Transthoracic Echocardiogram w/Doppler Complete (10.02.17 @ 09:05) >   Left ventricular hypertrophy with normal EF  2. Biatrial enlargement   3. aortic sclerosis moderate to  severe AI   4. mild Tricuspid regurgitation with RVSP 31 mm hg .  5. Moderate mitral regurgitation.  patient has AMS with metabolic encephalopathy with dehydration CHAVO with ckd3 with dehydration with poor PO intake with advanced dementia with behavioural disorder with valvular heart disease with diastolic heart failure with CLL, anemia of ch disease with diarrhea possible impaction, hypothermia, h/o CVA, esbl colonization, possible PNa, r/o sepsis   continue current care.   d/w son, pt DNR, prognosis poor   palliative care d/w son HCPcardio and Id consult    ID consult noted  Leukocytosis reactive resolved  Doubt pnumonia  or UTI   Hx of colonization with ESBL E coli   pt has AMS with advanced dementia,with dysphagia, with metabolic encephalopathy with chavo CKD 3, with dehdration with poor po intake, valvular heart disease , cll, anmei of ch disease, uti sepsis ruled out, palliative care d/w son. steroids tapered, pulmonary edema when receives Iv,acute respiratory failure with COPd possible PNa possible recurrent aspiration, ,cad , Multiorgan failure

## 2018-02-20 NOTE — PROGRESS NOTE ADULT - SUBJECTIVE AND OBJECTIVE BOX
Patient is a 89y Female with a known history of :  COPD (chronic obstructive pulmonary disease) (J44.9)  Sinus bradycardia (R00.1)  Valvular heart disease (I38)  Diarrhea, unspecified type (R19.7)  Gastroesophageal reflux disease without esophagitis (K21.9)  Hypothyroidism, unspecified type (E03.9)  CLL (chronic lymphocytic leukemia) (C91.10)  Dementia with behavioral disturbance, unspecified dementia type (F03.91)  HTN (hypertension), benign (I10)  Coronary artery disease involving native coronary artery of native heart without angina pectoris (I25.10)  PNA (pneumonia) (J18.9)    HPI:  89 yr old with PMH of Dementia,depression,severe aortic incompetence ,, CVA- haemorruagic stroke in 2005,severe GI bleed 4 yrs back,, gait disbility following CVA, parkinson hypothyroid, lactose intolerant,allergic to penicillin and contrast dye,,   CHF systolic  diastolic with copd  asymptomatic bacteruria, with h/o ESBL e coli colonization, with  fraility , CKD 3., GERD.   patient had thoracentesis on 11/20, and it is transudative    brought back for woesening lethargy, poor po intake diarrhea,  IN ED suspected HCAP started on vanc and aztreonam, being admitted for further care (17 Feb 2018 21:15)      REVIEW OF SYSTEMS:    CONSTITUTIONAL: No fever, weight loss, or fatigue  EYES: No eye pain, visual disturbances, or discharge  ENMT:  No difficulty hearing, tinnitus, vertigo; No sinus or throat pain  NECK: No pain or stiffness  BREASTS: No pain, masses, or nipple discharge  RESPIRATORY: No cough, wheezing, chills or hemoptysis; No shortness of breath  CARDIOVASCULAR: No chest pain, palpitations, dizziness, or leg swelling  GASTROINTESTINAL: No abdominal or epigastric pain. No nausea, vomiting, or hematemesis; No diarrhea or constipation. No melena or hematochezia.  GENITOURINARY: No dysuria, frequency, hematuria, or incontinence  NEUROLOGICAL: No headaches, memory loss, loss of strength, numbness, or tremors  SKIN: No itching, burning, rashes, or lesions   LYMPH NODES: No enlarged glands  ENDOCRINE: No heat or cold intolerance; No hair loss  MUSCULOSKELETAL: No joint pain or swelling; No muscle, back, or extremity pain  PSYCHIATRIC: No depression, anxiety, mood swings, or difficulty sleeping  HEME/LYMPH: No easy bruising, or bleeding gums  ALLERGY AND IMMUNOLOGIC: No hives or eczema    MEDICATIONS  (STANDING):  ALBUTerol    0.083% 2.5 milliGRAM(s) Nebulizer every 6 hours  buDESOnide 160 MICROgram(s)/formoterol 4.5 MICROgram(s) Inhaler 2 Puff(s) Inhalation two times a day  levothyroxine Injectable 50 MICROGram(s) IV Push <User Schedule>  methylPREDNISolone sodium succinate Injectable 40 milliGRAM(s) IV Push every 8 hours  pantoprazole  Injectable 40 milliGRAM(s) IV Push daily    MEDICATIONS  (PRN):  acetaminophen  Suppository 650 milliGRAM(s) Rectal every 6 hours PRN For Temp greater than 38 C (100.4 F)  acetaminophen  Suppository. 650 milliGRAM(s) Rectal every 6 hours PRN Moderate Pain (4 - 6)  hydrALAZINE Injectable 10 milliGRAM(s) IV Push every 4 hours PRN htn  metoprolol    tartrate Injectable 5 milliGRAM(s) IV Push every 6 hours PRN htn      ALLERGIES: IV Contrast (Unknown)  penicillin (Unknown)      FAMILY HISTORY:      Social history:  Alochol:   Smoking:   Drug Use:   Marital Status:     PHYSICAL EXAMINATION:  -----------------------------  T(C): 36.6 (02-20-18 @ 05:26), Max: 36.8 (02-19-18 @ 21:03)  HR: 80 (02-20-18 @ 07:29) (67 - 90)  BP: 143/65 (02-20-18 @ 05:26) (143/65 - 177/83)  RR: 16 (02-20-18 @ 05:26) (16 - 20)  SpO2: 97% (02-20-18 @ 07:29) (90% - 97%)  Wt(kg): --        Constitutional: well developed, normal appearance, well groomed, well nourished, no deformities and no acute distress.   Eyes: the conjunctiva exhibited no abnormalities and the eyelids demonstrated no xanthelasmas.   HEENT: normal oral mucosa, no oral pallor and no oral cyanosis.   Neck: normal jugular venous A waves present, normal jugular venous V waves present and no jugular venous leong A waves.   Pulmonary: no respiratory distress, normal respiratory rhythm and effort, no accessory muscle use and lungs were clear to auscultation bilaterally.   Cardiovascular: heart rate and rhythm were normal, normal S1 and S2 and no murmur, gallop, rub, heave or thrill are present.   Musculoskeletal: the gait could not be assessed.   Extremities: no clubbing of the fingernails, no localized cyanosis, no petechial hemorrhages and no ischemic changes.   Skin: normal skin color and pigmentation, no rash, no venous stasis, no skin lesions, no skin ulcer and no xanthoma was observed.     LABS:   --------  02-19    142  |  107  |  19  ----------------------------<  94  3.8   |  23  |  1.35<H>    Ca    8.2<L>      19 Feb 2018 06:56                           10.5   13.9  )-----------( 189      ( 20 Feb 2018 06:50 )             32.4           129 mg/dL, 61 mg/dL, 48 mg/dL, 101 mg/dL          Radiology:    < from: US Transthoracic Echocardiogram w/Doppler Complete (10.02.17 @ 09:05) >    EXAM:  US TTE W DOPPLER COMPLETE                                  PROCEDURE DATE:  10/02/2017          INTERPRETATION:    Indication: CHF    Technician: Mannie Tolliver    Study Quality:  fair     Measurements:   A root3.0 cm,LA 5.9cm, LVEDD 4.5cm,2.6cm, septal wall   thickness 1.3 cm, posterior wall thickness 1.3 cm   AV velocity 1,7   m/sec, MV velocity 1.1 m /sec  EF 65%    Mitral Valve: mild mitral annular calcification .thickened mitral valve   with moderate regurgitation ,  Aortic Valve:Thickened aortic valve with moderate to  severe   regurgitation   Left Atrium:  severely enlarged  Left Ventricle: Normal size , moderate left ventricular hypertrophy with   normal EF   ,  Pericardium: Trace pericardial effusion   Tricuspid Valve: Appears normal with mild regurgitation with RVSp 31  mm   hg   Pulmonic Valve: appears normal with mild regurgitation   Right Ventricle: Normal size with normal systolic function   Right Atrium: enlarged    Large pleural effusion noted     SUMMARY:  1. Left ventricular hypertrophy with normal EF  2. Biatrial enlargement   3. aortic sclerosis moderate to  severe AI   4. mild Tricuspid regurgitation with RVSP 31 mm hg .  5. Moderate mitral regurgitation.                  [facility]IOLOG[facility]  This document has been electronically signed. Oct  3 2017  8:16AM                < end of copied text > Patient is a 89y Female with a known history of :  COPD (chronic obstructive pulmonary disease) (J44.9)  Sinus bradycardia (R00.1)  Valvular heart disease (I38)  Diarrhea, unspecified type (R19.7)  Gastroesophageal reflux disease without esophagitis (K21.9)  Hypothyroidism, unspecified type (E03.9)  CLL (chronic lymphocytic leukemia) (C91.10)  Dementia with behavioral disturbance, unspecified dementia type (F03.91)  HTN (hypertension), benign (I10)  Coronary artery disease involving native coronary artery of native heart without angina pectoris (I25.10)  PNA (pneumonia) (J18.9)    HPI:  89 yr old with PMH of Dementia,depression,severe aortic incompetence ,, CVA- haemorruagic stroke in 2005,severe GI bleed 4 yrs back,, gait disbility following CVA, parkinson hypothyroid, lactose intolerant,allergic to penicillin and contrast dye,,   CHF systolic  diastolic with copd  asymptomatic bacteruria, with h/o ESBL e coli colonization, with  fraility , CKD 3., GERD.   patient had thoracentesis on 11/20, and it is transudative    brought back for woesening lethargy, poor po intake diarrhea,  IN ED suspected HCAP started on vanc and aztreonam, being admitted for further care (17 Feb 2018 21:15)      REVIEW OF SYSTEMS:    CONSTITUTIONAL: No fever, weight loss, or fatigue  EYES: No eye pain, visual disturbances, or discharge  ENMT:  No difficulty hearing, tinnitus, vertigo; No sinus or throat pain  NECK: No pain or stiffness  BREASTS: No pain, masses, or nipple discharge  RESPIRATORY: No cough, wheezing, chills or hemoptysis; No shortness of breath  CARDIOVASCULAR: No chest pain, palpitations, dizziness, or leg swelling  GASTROINTESTINAL: No abdominal or epigastric pain. No nausea, vomiting, or hematemesis; No diarrhea or constipation. No melena or hematochezia.  GENITOURINARY: No dysuria, frequency, hematuria, or incontinence  NEUROLOGICAL: No headaches, memory loss, loss of strength, numbness, or tremors  SKIN: No itching, burning, rashes, or lesions   LYMPH NODES: No enlarged glands  ENDOCRINE: No heat or cold intolerance; No hair loss  MUSCULOSKELETAL: No joint pain or swelling; No muscle, back, or extremity pain  PSYCHIATRIC: No depression, anxiety, mood swings, or difficulty sleeping  HEME/LYMPH: No easy bruising, or bleeding gums  ALLERGY AND IMMUNOLOGIC: No hives or eczema    MEDICATIONS  (STANDING):  ALBUTerol    0.083% 2.5 milliGRAM(s) Nebulizer every 6 hours  buDESOnide 160 MICROgram(s)/formoterol 4.5 MICROgram(s) Inhaler 2 Puff(s) Inhalation two times a day  levothyroxine Injectable 50 MICROGram(s) IV Push <User Schedule>  methylPREDNISolone sodium succinate Injectable 40 milliGRAM(s) IV Push every 8 hours  pantoprazole  Injectable 40 milliGRAM(s) IV Push daily    MEDICATIONS  (PRN):  acetaminophen  Suppository 650 milliGRAM(s) Rectal every 6 hours PRN For Temp greater than 38 C (100.4 F)  acetaminophen  Suppository. 650 milliGRAM(s) Rectal every 6 hours PRN Moderate Pain (4 - 6)  hydrALAZINE Injectable 10 milliGRAM(s) IV Push every 4 hours PRN htn  metoprolol    tartrate Injectable 5 milliGRAM(s) IV Push every 6 hours PRN htn      ALLERGIES: IV Contrast (Unknown)  penicillin (Unknown)      FAMILY HISTORY:      Social history:  Alochol:   Smoking:   Drug Use:   Marital Status:     PHYSICAL EXAMINATION:  -----------------------------  T(C): 36.6 (02-20-18 @ 05:26), Max: 36.8 (02-19-18 @ 21:03)  HR: 80 (02-20-18 @ 07:29) (67 - 90)  BP: 143/65 (02-20-18 @ 05:26) (143/65 - 177/83)  RR: 16 (02-20-18 @ 05:26) (16 - 20)  SpO2: 97% (02-20-18 @ 07:29) (90% - 97%)  Wt(kg): --        Constitutional: well developed, normal appearance, well groomed, well nourished, no deformities and no acute distress.   Eyes: the conjunctiva exhibited no abnormalities and the eyelids demonstrated no xanthelasmas.   HEENT: normal oral mucosa, no oral pallor and no oral cyanosis.   Neck: normal jugular venous A waves present, normal jugular venous V waves present and no jugular venous leong A waves.   Pulmonary: no respiratory distress, normal respiratory rhythm and effort, no accessory muscle use and lungs were clear to auscultation bilaterally. Anteriorly  Cardiovascular: heart rate and rhythm were normal, normal S1 and S2 with II/VI systolic murmur.  Musculoskeletal: the gait could not be assessed.   Extremities: no clubbing of the fingernails, no localized cyanosis, no petechial hemorrhages and no ischemic changes.   Skin: normal skin color and pigmentation, no rash, no venous stasis, no skin lesions, no skin ulcer and no xanthoma was observed.     LABS:   --------  02-19    142  |  107  |  19  ----------------------------<  94  3.8   |  23  |  1.35<H>    Ca    8.2<L>      19 Feb 2018 06:56                           10.5   13.9  )-----------( 189      ( 20 Feb 2018 06:50 )             32.4           129 mg/dL, 61 mg/dL, 48 mg/dL, 101 mg/dL          Radiology:    < from: US Transthoracic Echocardiogram w/Doppler Complete (10.02.17 @ 09:05) >    EXAM:  US TTE W DOPPLER COMPLETE                                  PROCEDURE DATE:  10/02/2017          INTERPRETATION:    Indication: CHF    Technician: Mannie Tolliver    Study Quality:  fair     Measurements:   A root3.0 cm,LA 5.9cm, LVEDD 4.5cm,2.6cm, septal wall   thickness 1.3 cm, posterior wall thickness 1.3 cm   AV velocity 1,7   m/sec, MV velocity 1.1 m /sec  EF 65%    Mitral Valve: mild mitral annular calcification .thickened mitral valve   with moderate regurgitation ,  Aortic Valve:Thickened aortic valve with moderate to  severe   regurgitation   Left Atrium:  severely enlarged  Left Ventricle: Normal size , moderate left ventricular hypertrophy with   normal EF   ,  Pericardium: Trace pericardial effusion   Tricuspid Valve: Appears normal with mild regurgitation with RVSp 31  mm   hg   Pulmonic Valve: appears normal with mild regurgitation   Right Ventricle: Normal size with normal systolic function   Right Atrium: enlarged    Large pleural effusion noted     SUMMARY:  1. Left ventricular hypertrophy with normal EF  2. Biatrial enlargement   3. aortic sclerosis moderate to  severe AI   4. mild Tricuspid regurgitation with RVSP 31 mm hg .  5. Moderate mitral regurgitation.                  [facility]IOLOG[facility]  This document has been electronically signed. Oct  3 2017  8:16AM                < end of copied text >

## 2018-02-20 NOTE — PROGRESS NOTE ADULT - SUBJECTIVE AND OBJECTIVE BOX
PULMONARY/CRITICAL CARE      INTERVAL HPI/OVERNIGHT EVENTS:  Looks much better, less sob.    89y FemaleHPI:  89 yr old with PMH of Dementia,depression,severe aortic incompetence ,, CVA- haemorruagic stroke in 2005,severe GI bleed 4 yrs back,, gait disbility following CVA, parkinson hypothyroid, lactose intolerant,allergic to penicillin and contrast dye,,   CHF systolic  diastolic with copd  asymptomatic bacteruria, with h/o ESBL e coli colonization, with  fraility , CKD 3., GERD.   patient had thoracentesis on 11/20, and it is transudative    brought back for woesening lethargy, poor po intake diarrhea,  IN ED suspected HCAP started on vanc and aztreonam, being admitted for further care (17 Feb 2018 21:15)        PAST MEDICAL & SURGICAL HISTORY:  CLL (chronic lymphocytic leukemia)  Dementia with behavioral disturbance, unspecified dementia type  Gastrointestinal hemorrhage, unspecified gastrointestinal hemorrhage type  Nontraumatic intracerebral hemorrhage, unspecified cerebral location, unspecified laterality  Rhinitis  Anxiety  GERD (gastroesophageal reflux disease)  Hypothyroidism  Coronary artery disease  COPD (chronic obstructive pulmonary disease)  Closed fracture of neck of left femur, initial encounter        ICU Vital Signs Last 24 Hrs  T(C): 36.6 (20 Feb 2018 05:26), Max: 36.8 (19 Feb 2018 21:03)  T(F): 97.8 (20 Feb 2018 05:26), Max: 98.2 (19 Feb 2018 21:03)  HR: 80 (20 Feb 2018 07:29) (67 - 90)  BP: 143/65 (20 Feb 2018 05:26) (143/65 - 177/83)  BP(mean): --  ABP: --  ABP(mean): --  RR: 16 (20 Feb 2018 05:26) (16 - 20)  SpO2: 97% (20 Feb 2018 07:29) (90% - 97%)    Qtts:     I&O's Summary    REVIEW OF SYSTEMS:    CONSTITUTIONAL: No fever, weight loss, or fatigue  EYES: No eye pain, visual disturbances, or discharge  ENMT:  No difficulty hearing, tinnitus, vertigo; No sinus or throat pain  NECK: No pain or stiffness  BREASTS: No pain, masses, or nipple discharge  RESPIRATORY: has cough, wheezing, no chills or hemoptysis; has shortness of breath  CARDIOVASCULAR: No chest pain, palpitations, dizziness, or leg swelling  GASTROINTESTINAL: No abdominal or epigastric pain. No nausea, vomiting, or hematemesis; No diarrhea or constipation. No melena or hematochezia.  GENITOURINARY: No dysuria, frequency, hematuria, or incontinence  NEUROLOGICAL: No headaches,  numbness, or tremors  SKIN: No itching, burning, rashes, or lesions   LYMPH NODES: No enlarged glands  ENDOCRINE: No heat or cold intolerance; No hair loss  MUSCULOSKELETAL: No joint pain or swelling; No muscle, back, or extremity pain, no calf tenderness  PSYCHIATRIC: No depression, anxiety, mood swings, or difficulty sleeping  HEME/LYMPH: No easy bruising, or bleeding gums  ALLERGY AND IMMUNOLOGIC: No hives or eczema      PHYSICAL EXAM:    GENERAL: no resp distress, thin female, well-groomed, well-developed,   HEAD:  Atraumatic, Normocephalic  EYES: EOMI, PERRLA, conjunctiva and sclera clear  ENMT: No tonsillar erythema, exudates, or enlargement; Moist mucous membranes, Good dentition, No lesions  NECK: Supple, No JVD, Normal thyroid  NERVOUS SYSTEM:  Alert confused Motor Strength 5/5 B/L upper and lower extremities  CHEST/LUNG: decreased bilat, rhonchi, wheezing, good excursion, no rubs  HEART: Regular rate and rhythm; No murmurs, rubs, or gallops  ABDOMEN: Soft, Nontender, Nondistended; Bowel sounds present  EXTREMITIES:  2+ Peripheral Pulses, No clubbing, cyanosis, or edema  LYMPH: No lymphadenopathy noted  SKIN: No rashes or lesions            LABS:                        10.5   13.9  )-----------( 189      ( 20 Feb 2018 06:50 )             32.4     02-20    142  |  108  |  23  ----------------------------<  107<H>  3.8   |  26  |  1.32<H>    Ca    7.9<L>      20 Feb 2018 06:50          ABG - ( 19 Feb 2018 07:34 )  pH: x     /  pCO2: 35    /  pO2: 158   / HCO3: 22    / Base Excess: -3.5  /  SaO2: 99                vanco through     RADIOLOGY & ADDITIONAL STUDIES:  < from: Xray Chest 1 View-PORTABLE IMMEDIATE (02.19.18 @ 08:10) >    EXAM:  XR CHEST PORTABLE IMMED 1V                                  PROCEDURE DATE:  02/19/2018          INTERPRETATION:  Chest one view    HISTORY: Respiratory distress    COMPARISON STUDY: 2/17/2018    Frontal expiratory view of the chest shows the heart to be similar in   size. Pulmonary vascularity shows more congestion. The lungs show   progression of pleural effusions and there is no evidence of pneumothorax.    IMPRESSION:  Congestive changes as described.    Thank you for the courtesy ofthis referral.                  TE MENDEZ M.D., ATTENDING RADIOLOGIST  This document has been electronically signed. Feb 19 2018  8:17AM              < end of copied text >      CRITICAL CARE TIME SPENT:

## 2018-02-20 NOTE — PROGRESS NOTE ADULT - SUBJECTIVE AND OBJECTIVE BOX
LATOSHA MOONEY is a 89yFemale , patient examined and chart reviewed. Patient seen and examined today being followed for respiratory distress and chf exacerbation       INTERVAL HPI/ OVERNIGHT EVENTS: Events noted,, still with cough . Failed swallow evaluation . Now NPO. Son in past has refused peg, she was on comfort care .    PAST MEDICAL & SURGICAL HISTORY:  CLL (chronic lymphocytic leukemia)  Dementia with behavioral disturbance, unspecified dementia type  Gastrointestinal hemorrhage, unspecified gastrointestinal hemorrhage type  Nontraumatic intracerebral hemorrhage, unspecified cerebral location, unspecified laterality  Rhinitis  Anxiety  GERD (gastroesophageal reflux disease)  Hypothyroidism  Coronary artery disease  COPD (chronic obstructive pulmonary disease)  Closed fracture of neck of left femur, initial encounter      For details regarding the patient's social history, family history, and other miscellaneous elements, please refer the initial infectious diseases consultation and/or the admitting history and physical examination for this admission.    ROS:  Unable to obtain due to : dementia       Allergies:  IV Contrast (Unknown)  penicillin (Unknown)      Current inpatient medications :    ANTIBIOTICS/RELEVANT:      acetaminophen  Suppository 650 milliGRAM(s) Rectal every 6 hours PRN  acetaminophen  Suppository. 650 milliGRAM(s) Rectal every 6 hours PRN  ALBUTerol    0.083% 2.5 milliGRAM(s) Nebulizer every 6 hours  buDESOnide 160 MICROgram(s)/formoterol 4.5 MICROgram(s) Inhaler 2 Puff(s) Inhalation two times a day  hydrALAZINE Injectable 10 milliGRAM(s) IV Push every 4 hours PRN  levothyroxine Injectable 50 MICROGram(s) IV Push <User Schedule>  methylPREDNISolone sodium succinate Injectable 40 milliGRAM(s) IV Push every 8 hours  metoprolol    tartrate Injectable 5 milliGRAM(s) IV Push every 6 hours PRN  pantoprazole  Injectable 40 milliGRAM(s) IV Push daily      Objective:    T(C): 36.6 (02-20-18 @ 05:26), Max: 36.8 (02-19-18 @ 21:03)  HR: 80 (02-20-18 @ 07:29) (67 - 90)  BP: 143/65 (02-20-18 @ 05:26) (143/65 - 177/83)  RR: 16 (02-20-18 @ 05:26) (16 - 20)  SpO2: 97% (02-20-18 @ 07:29) (90% - 97%)  Wt(kg): --      Physical Exam:  General: elderly cachectic female in mild cute distress  Eyes: sclera anicteric, pupils equal and reactive to light  ENMT: buccal mucosa dry, pharynx retained secretions   Neck: supple, trachea midline  Lungs: bilateral coarse rhonchi  Cardiovascular: regular rate and rhythm, S1 S2  Abdomen: soft, nontender, no organomegaly present, bowel sounds normal  Neurological: awake, confused , Cranial Nerves II-XII grossly intact  Skin: no increased ecchymosis/petechiae/purpura  Lymph Nodes: no palpable cervical/supraclavicular lymph nodes enlargements  Extremities: no cyanosis/clubbing/edema    LABS:                          10.5   13.9  )-----------( 189      ( 20 Feb 2018 06:50 )             32.4       02-20    142  |  108  |  23  ----------------------------<  107<H>  3.8   |  26  |  1.32<H>    Ca    7.9<L>      20 Feb 2018 06:50      Procalcitonin, Serum (02.19.18 @ 05:14)    Procalcitonin, Serum: 0.44:    Serum Pro-Brain Natriuretic Peptide (02.18.18 @ 11:46)    Serum Pro-Brain Natriuretic Peptide: 27996 pg/mL    ABG - ( 19 Feb 2018 07:34 )  pH: 7.39    /  pCO2: 35    /  pO2: 158   / HCO3: 22    / Base Excess: -3.5  /  SaO2: 99        RECENT CULTURES:    Culture - Blood (collected 18 Feb 2018 01:06)  Source: .Blood Blood-Peripheral  Preliminary Report (19 Feb 2018 02:02):    No growth to date.    Culture - Blood (collected 18 Feb 2018 01:06)  Source: .Blood Blood-Peripheral  Preliminary Report (19 Feb 2018 02:02):    No growth to date.    Culture - Urine (collected 17 Feb 2018 22:14)  Source: .Urine Catheterized  Final Report (18 Feb 2018 23:24):    No growth      RADIOLOGY & ADDITIONAL STUDIES:  Xray Chest 1 View-PORTABLE IMMEDIATE (02.19.18 @ 08:10) >  Frontal expiratory view of the chest shows the heart to be similar in   size. Pulmonary vascularity shows more congestion. The lungs show   progression of pleural effusions and there is no evidence of pneumothorax.    IMPRESSION:  Congestive changes as described.    Thank you for the courtesy ofthis referral.    Assessment :  89 yr old with PMH of Dementia, depression, severe aortic stenosis, CHF, CVA, GIB, CLL  admitted with AMS, and  SOB likely CHF exacerbation  Leukocytosis reactive resolved  Doubt pnumonia  or UTI   Hx of colonization with ESBL E coli   CKD  Failure to thrive   high risk for aspiration     Plan :   - consider taper and dc steroids   - needs diuretics   - will observe off antibiotics   - consider comfort care if family refuses NGT feeds     Continue with present regime .  Appropriate use of antibiotics and adverse effects reviewed.    Advanced directives noted , She is DNR/DNI     I have discussed the above plan of care with patient's  family in detail. They expressed understanding of the  treatment plan . Risks, benefits and alternatives discussed in detail. I have asked if they have any questions or concerns and appropriately addressed them to the best of my ability .    > 25 minutes were spent in direct patient care reviewing notes, medications ,labs data/ imaging , discussion with multidisciplinary team.    Thank you for allowing me to participate in care of your patient .    Ethan Ochoa MD  [phone]

## 2018-02-20 NOTE — PROGRESS NOTE ADULT - SUBJECTIVE AND OBJECTIVE BOX
Patient is a 89y old  Female who presents with a chief complaint of AMS/Lethargic (18 Feb 2018 01:56)      INTERVAL HPI/OVERNIGHT EVENTS:pt is unable to swallow    Home Medications:  bacitracin 500 units/g topical ointment: Apply topically to affected area once a day (19 Nov 2017 04:37)  buPROPion 150 mg/24 hours (XL) oral tablet, extended release: 1 tab(s) orally once a day (19 Nov 2017 04:37)  Calcium 600+D 600 mg-200 intl units oral tablet: 1 tab(s) orally once a day (19 Nov 2017 04:37)  carbidopa-levodopa 25 mg-100 mg oral tablet: 1 tab(s) orally once a day (19 Nov 2017 04:37)  carvedilol 3.125 mg oral tablet: 1 tab(s) orally every 12 hours (19 Nov 2017 04:37)  DuoNeb 0.5 mg-2.5 mg/3 mL inhalation solution: 3 milliliter(s) inhaled 2 times a day (19 Nov 2017 04:37)  fluticasone 50 mcg/inh nasal spray: 1 spray(s) in each nostril once a day (19 Nov 2017 04:37)  isosorbide dinitrate 30 mg oral tablet: 1 tab(s) orally once a day (19 Nov 2017 04:37)  Multiple Vitamins oral tablet: 1 tab(s) orally once a day (19 Nov 2017 04:37)  omeprazole 20 mg oral delayed release capsule: 1 cap(s) orally once a day (19 Nov 2017 04:37)  Symbicort 160 mcg-4.5 mcg/inh inhalation aerosol: 2 puff(s) inhaled 2 times a day (19 Nov 2017 04:37)      MEDICATIONS  (STANDING):  ALBUTerol    0.083% 2.5 milliGRAM(s) Nebulizer every 6 hours  buDESOnide 160 MICROgram(s)/formoterol 4.5 MICROgram(s) Inhaler 2 Puff(s) Inhalation two times a day  levothyroxine Injectable 50 MICROGram(s) IV Push <User Schedule>  methylPREDNISolone sodium succinate Injectable 40 milliGRAM(s) IV Push two times a day  pantoprazole  Injectable 40 milliGRAM(s) IV Push daily    MEDICATIONS  (PRN):  acetaminophen  Suppository 650 milliGRAM(s) Rectal every 6 hours PRN For Temp greater than 38 C (100.4 F)  acetaminophen  Suppository. 650 milliGRAM(s) Rectal every 6 hours PRN Moderate Pain (4 - 6)  hydrALAZINE Injectable 10 milliGRAM(s) IV Push every 4 hours PRN htn  metoprolol    tartrate Injectable 5 milliGRAM(s) IV Push every 6 hours PRN htn      Allergies    IV Contrast (Unknown)  penicillin (Unknown)    Intolerances        REVIEW OF SYSTEMS:  unable as pt is lethargic, non verbal,  Vital Signs Last 24 Hrs  T(C): 36.3 (20 Feb 2018 10:02), Max: 36.8 (19 Feb 2018 21:03)  T(F): 97.4 (20 Feb 2018 10:02), Max: 98.2 (19 Feb 2018 21:03)  HR: 90 (20 Feb 2018 10:02) (67 - 90)  BP: 170/57 (20 Feb 2018 10:02) (143/65 - 170/57)  BP(mean): --  RR: 20 (20 Feb 2018 10:02) (16 - 20)  SpO2: 96% (20 Feb 2018 10:02) (92% - 97%)    PHYSICAL EXAM:  GENERAL: lethargic, non verbal  HEAD:  Atraumatic, Normocephalic  EYES: EOMI, PERRLA, conjunctiva and sclera clear  ENMT: Moist mucous membranes,   NECK: Supple, No JVD, Normal thyroid  NERVOUS SYSTEM:  non verbal,Strength 2/5 B/L upper and lower extremities; DTRs 2+ intact and symmetric  CHEST/LUNG: Clear to percussion bilaterally; No rales, rhonchi, wheezing, or rubs  HEART: Regular rate and rhythm; No murmurs, rubs, or gallops  ABDOMEN: Soft, Nontender, Nondistended; Bowel sounds present  EXTREMITIES:  2+ Peripheral Pulses, No clubbing, cyanosis, or edema  LYMPH: No lymphadenopathy noted  SKIN: No rashes or lesions    LABS:                        10.5   13.9  )-----------( 189      ( 20 Feb 2018 06:50 )             32.4     02-20    142  |  108  |  23  ----------------------------<  107<H>  3.8   |  26  |  1.32<H>    Ca    7.9<L>      20 Feb 2018 06:50          CAPILLARY BLOOD GLUCOSE            Culture - Blood (collected 02-18-18 @ 01:06)  Source: .Blood Blood-Peripheral  Preliminary Report (02-19-18 @ 02:02):    No growth to date.    Culture - Blood (collected 02-18-18 @ 01:06)  Source: .Blood Blood-Peripheral  Preliminary Report (02-19-18 @ 02:02):    No growth to date.    Culture - Urine (collected 02-17-18 @ 22:14)  Source: .Urine Catheterized  Final Report (02-18-18 @ 23:24):    No growth        RADIOLOGY & ADDITIONAL TESTS:    Imaging Personally Reviewed:  x ] YES  [ ] NO    Consultant(s) Notes Reviewed:  [ x] YES  [ ] NO  x  Care Discussed with Consultants/Other Providers [ ] YES  [ ] NO

## 2018-02-20 NOTE — PROGRESS NOTE ADULT - ASSESSMENT
89y/o w/f seen at [facility] telemetry.  History HTN, CLL, AS, COPD, dementia, thyroid disease, GERD, s/p sub-arachnoid surgery, s/p GI bleed.    2/17/18 admitted for lethargy, diarrhea, decreased appetite.    2/20/18  Awake and alert.  No cardiac complaints offered.  "I feel fine."    Plan:  - Continue metoprolol.  - Encourage nutrition.  - Increase ambulation with assistance if possible.

## 2018-02-21 LAB
ANION GAP SERPL CALC-SCNC: 12 MMOL/L — SIGNIFICANT CHANGE UP (ref 5–17)
BUN SERPL-MCNC: 30 MG/DL — HIGH (ref 7–23)
CALCIUM SERPL-MCNC: 8.2 MG/DL — LOW (ref 8.4–10.5)
CHLORIDE SERPL-SCNC: 110 MMOL/L — HIGH (ref 96–108)
CO2 SERPL-SCNC: 26 MMOL/L — SIGNIFICANT CHANGE UP (ref 22–31)
CREAT SERPL-MCNC: 1.22 MG/DL — SIGNIFICANT CHANGE UP (ref 0.5–1.3)
GLUCOSE SERPL-MCNC: 81 MG/DL — SIGNIFICANT CHANGE UP (ref 70–99)
HCT VFR BLD CALC: 34.1 % — LOW (ref 34.5–45)
HGB BLD-MCNC: 10.8 G/DL — LOW (ref 11.5–15.5)
MCHC RBC-ENTMCNC: 28.3 PG — SIGNIFICANT CHANGE UP (ref 27–34)
MCHC RBC-ENTMCNC: 31.7 GM/DL — LOW (ref 32–36)
MCV RBC AUTO: 89.2 FL — SIGNIFICANT CHANGE UP (ref 80–100)
PLATELET # BLD AUTO: 198 K/UL — SIGNIFICANT CHANGE UP (ref 150–400)
POTASSIUM SERPL-MCNC: 3.7 MMOL/L — SIGNIFICANT CHANGE UP (ref 3.5–5.3)
POTASSIUM SERPL-SCNC: 3.7 MMOL/L — SIGNIFICANT CHANGE UP (ref 3.5–5.3)
RBC # BLD: 3.83 M/UL — SIGNIFICANT CHANGE UP (ref 3.8–5.2)
RBC # FLD: 15.6 % — HIGH (ref 10.3–14.5)
SODIUM SERPL-SCNC: 148 MMOL/L — HIGH (ref 135–145)
WBC # BLD: 19.4 K/UL — HIGH (ref 3.8–10.5)
WBC # FLD AUTO: 19.4 K/UL — HIGH (ref 3.8–10.5)

## 2018-02-21 PROCEDURE — 71045 X-RAY EXAM CHEST 1 VIEW: CPT | Mod: 26

## 2018-02-21 RX ADMIN — Medication 10 MILLIGRAM(S): at 09:52

## 2018-02-21 RX ADMIN — Medication 40 MILLIGRAM(S): at 05:52

## 2018-02-21 RX ADMIN — ALBUTEROL 2.5 MILLIGRAM(S): 90 AEROSOL, METERED ORAL at 07:38

## 2018-02-21 RX ADMIN — Medication 10 MILLIGRAM(S): at 22:16

## 2018-02-21 RX ADMIN — ALBUTEROL 2.5 MILLIGRAM(S): 90 AEROSOL, METERED ORAL at 13:51

## 2018-02-21 RX ADMIN — PANTOPRAZOLE SODIUM 40 MILLIGRAM(S): 20 TABLET, DELAYED RELEASE ORAL at 12:13

## 2018-02-21 RX ADMIN — ALBUTEROL 2.5 MILLIGRAM(S): 90 AEROSOL, METERED ORAL at 20:15

## 2018-02-21 RX ADMIN — Medication 10 MILLIGRAM(S): at 14:44

## 2018-02-21 RX ADMIN — Medication 50 MICROGRAM(S): at 05:53

## 2018-02-21 NOTE — PROGRESS NOTE ADULT - ASSESSMENT
89 yr old with PMH of Dementia,depression,severe aortic incompetence ,, CVA- haemorruagic stroke in 2005,severe GI bleed 4 yrs back,, gait disbility following CVA, parkinson hypothyroid, lactose intolerant,allergic to penicillin and contrast dye,,   CHF systolic  diastolic with copd  asymptomatic bacteruria, with h/o ESBL e coli colonization, with  fraility , CKD 3., GERD.   patient had thoracentesis on 11/20, and it is transudative    brought back for woesening lethargy, poor po intake diarrhea,altered mental state likely encephalopathy with advanced dementia dehydration chavo ckd  IN ED suspected HCAP started on vanc and aztreonam, being admitted for further care   from: US Transthoracic Echocardiogram w/Doppler Complete (10.02.17 @ 09:05) >   Left ventricular hypertrophy with normal EF  2. Biatrial enlargement   3. aortic sclerosis moderate to  severe AI   4. mild Tricuspid regurgitation with RVSP 31 mm hg .  5. Moderate mitral regurgitation.  patient has AMS with metabolic encephalopathy with dehydration CHAVO with ckd3 with dehydration with poor PO intake with advanced dementia with behavioural disorder with valvular heart disease with diastolic heart failure with CLL, anemia of ch disease with diarrhea possible impaction, hypothermia, h/o CVA, esbl colonization, possible PNa, r/o sepsis   ID consult noted  Leukocytosis reactive resolved  Doubt pnumonia  or UTI   Hx of colonization with ESBL E coli   pt has AMS with advanced dementia,with dysphagia, with metabolic encephalopathy with chavo CKD 3, with dehdration with poor po intake, valvular heart disease , cll, anemia  of ch disease, uti sepsis ruled out, palliative care d/w son. steroids tapered, pulmonary edema when receives Iv,acute respiratory failure with hypoxia, with COPd possible PNa possible recurrent aspiration, ,cad , Multiorgan failure. as patient not eating and not swallowing, on IV meds PRN. neuro consult noted advanced dementia with dysphagia, comfort care suggested as family do not want tube feeds  Family meeting on 2/21/18 to discuss palliative care.

## 2018-02-21 NOTE — PROGRESS NOTE ADULT - SUBJECTIVE AND OBJECTIVE BOX
LATOSHA MOONEY is a 89yFemale , patient examined and chart reviewed. Patient seen and examined today being followed for respiratory distress and chf exacerbation       INTERVAL HPI/ OVERNIGHT EVENTS: Events noted,, still with cough . Failed swallow evaluation . Now NPO. Son in past has refused peg, she was on comfort care . Scheduled for family meeting today     PAST MEDICAL & SURGICAL HISTORY:  CLL (chronic lymphocytic leukemia)  Dementia with behavioral disturbance, unspecified dementia type  Gastrointestinal hemorrhage, unspecified gastrointestinal hemorrhage type  Nontraumatic intracerebral hemorrhage, unspecified cerebral location, unspecified laterality  Rhinitis  Anxiety  GERD (gastroesophageal reflux disease)  Hypothyroidism  Coronary artery disease  COPD (chronic obstructive pulmonary disease)  Closed fracture of neck of left femur, initial encounter      For details regarding the patient's social history, family history, and other miscellaneous elements, please refer the initial infectious diseases consultation and/or the admitting history and physical examination for this admission.    ROS:  Unable to obtain due to : dementia       Allergies:  IV Contrast (Unknown)  penicillin (Unknown)      Current inpatient medications :  MEDICATIONS  (STANDING):  ALBUTerol    0.083% 2.5 milliGRAM(s) Nebulizer every 6 hours  buDESOnide 160 MICROgram(s)/formoterol 4.5 MICROgram(s) Inhaler 2 Puff(s) Inhalation two times a day  levothyroxine Injectable 50 MICROGram(s) IV Push <User Schedule>  methylPREDNISolone sodium succinate Injectable 40 milliGRAM(s) IV Push daily  pantoprazole  Injectable 40 milliGRAM(s) IV Push daily        Objective:  Vital Signs Last 24 Hrs  T(C): 36.7 (21 Feb 2018 09:47), Max: 36.9 (20 Feb 2018 14:44)  T(F): 98 (21 Feb 2018 09:47), Max: 98.4 (20 Feb 2018 14:44)  HR: 68 (21 Feb 2018 09:47) (68 - 86)  BP: 190/60 (21 Feb 2018 09:47) (154/77 - 190/60)  BP(mean): --  RR: 18 (21 Feb 2018 09:47) (18 - 20)  SpO2: 97% (21 Feb 2018 09:47) (95% - 98%)    Physical Exam:  General: elderly cachectic female in mild cute distress  Eyes: sclera anicteric, pupils equal and reactive to light  ENMT: buccal mucosa dry, pharynx retained secretions   Neck: supple, trachea midline  Lungs: bilateral coarse rhonchi  Cardiovascular: regular rate and rhythm, S1 S2  Abdomen: soft, nontender, no organomegaly present, bowel sounds normal  Neurological: awake, confused , Cranial Nerves II-XII grossly intact  Skin: no increased ecchymosis/petechiae/purpura  Lymph Nodes: no palpable cervical/supraclavicular lymph nodes enlargements  Extremities: no cyanosis/clubbing/edema    LABS:               10.8   19.4   )----------(  198       ( 21 Feb 2018 06:56 )               34.1      148    |  110    |  30     ----------------------------<  81         ( 21 Feb 2018 06:56 )  3.7     |  26     |  1.22     Ca    8.2        ( 21 Feb 2018 06:56 )      Procalcitonin, Serum (02.19.18 @ 05:14)    Procalcitonin, Serum: 0.44:    Serum Pro-Brain Natriuretic Peptide (02.18.18 @ 11:46)    Serum Pro-Brain Natriuretic Peptide: 13469 pg/mL    ABG - ( 19 Feb 2018 07:34 )  pH: 7.39    /  pCO2: 35    /  pO2: 158   / HCO3: 22    / Base Excess: -3.5  /  SaO2: 99        RECENT CULTURES:    Culture - Blood (collected 18 Feb 2018 01:06)  Source: .Blood Blood-Peripheral  Preliminary Report (19 Feb 2018 02:02):    No growth to date.    Culture - Blood (collected 18 Feb 2018 01:06)  Source: .Blood Blood-Peripheral  Preliminary Report (19 Feb 2018 02:02):    No growth to date.    Culture - Urine (collected 17 Feb 2018 22:14)  Source: .Urine Catheterized  Final Report (18 Feb 2018 23:24):    No growth      RADIOLOGY & ADDITIONAL STUDIES:  Xray Chest 1 View-PORTABLE IMMEDIATE (02.19.18 @ 08:10) >  Frontal expiratory view of the chest shows the heart to be similar in   size. Pulmonary vascularity shows more congestion. The lungs show   progression of pleural effusions and there is no evidence of pneumothorax.    IMPRESSION:  Congestive changes as described.    Thank you for the courtesy oftchis referral.    Assessment :  89 yr old with PMH of Dementia, depression, severe aortic stenosis, CHF, CVA, GIB, CLL  admitted with AMS, and  SOB likely CHF exacerbation  Leukocytosis reactive resolved  Doubt pnumonia  or UTI   Hx of colonization with ESBL E coli   CKD  Failure to thrive   high risk for aspiration   Leukocytosis secondary to steroids ?? aspiration     Plan :   - consider taper and dc steroids   - needs diuretics   - will observe off antibiotics   - consider comfort care Hospice as  family refuses NGT feeds , scheduled for family meeting today . if placed on comfort care, then dc back to [facility] on hospice      Continue with present regime .  Appropriate use of antibiotics and adverse effects reviewed.    Advanced directives noted , She is DNR/DNI     I have discussed the above plan of care with patient's  family in detail. They expressed understanding of the  treatment plan . Risks, benefits and alternatives discussed in detail. I have asked if they have any questions or concerns and appropriately addressed them to the best of my ability .    > 25 minutes were spent in direct patient care reviewing notes, medications ,labs data/ imaging , discussion with multidisciplinary team.    Thank you for allowing me to participate in care of your patient .    Ethan Ochoa MD  [phone]

## 2018-02-21 NOTE — CHART NOTE - NSCHARTNOTEFT_GEN_A_CORE
Assessment: Pt admitted from [facility] c  pna, CHF exacerbation. Hx COPD, dementia, CVA, parkinson's. Pt was assessed by SLP and NPO recommended. Family meeting pending to determine if enteral feeding desired.     Factors impacting intake: [ ] none [ ] nausea  [ ] vomiting [ ] diarrhea [ ] constipation  [ ]chewing problems [ x] swallowing issues  [ ] other:     Diet Presciption: Diet, NPO:   Except Medications (02-17-18 @ 21:36)    Intake: N/A NPO    Current Weight: Weight (kg): 45.4 (02-17 @ 18:32)  % Weight Change    Pertinent Medications: MEDICATIONS  (STANDING):  ALBUTerol    0.083% 2.5 milliGRAM(s) Nebulizer every 6 hours  buDESOnide 160 MICROgram(s)/formoterol 4.5 MICROgram(s) Inhaler 2 Puff(s) Inhalation two times a day  levothyroxine Injectable 50 MICROGram(s) IV Push <User Schedule>  methylPREDNISolone sodium succinate Injectable 40 milliGRAM(s) IV Push daily  pantoprazole  Injectable 40 milliGRAM(s) IV Push daily    MEDICATIONS  (PRN):  acetaminophen  Suppository 650 milliGRAM(s) Rectal every 6 hours PRN For Temp greater than 38 C (100.4 F)  acetaminophen  Suppository. 650 milliGRAM(s) Rectal every 6 hours PRN Moderate Pain (4 - 6)  hydrALAZINE Injectable 10 milliGRAM(s) IV Push every 4 hours PRN htn  metoprolol    tartrate Injectable 5 milliGRAM(s) IV Push every 6 hours PRN htn    Pertinent Labs: 02-21 Na148 mmol/L<H> Glu 81 mg/dL K+ 3.7 mmol/L Cr  1.22 mg/dL BUN 30 mg/dL<H> Phos n/a   Alb n/a   PAB n/a        CAPILLARY BLOOD GLUCOSE        Skin:     Estimated Needs:   x] no change since previous assessment  [ ] recalculated:     Previous Nutrition Diagnosis:   [ ] Inadequate Energy Intake [ ]Inadequate Oral Intake [ ] Excessive Energy Intake   [ ] Underweight [ ] Increased Nutrient Needs [ ] Overweight/Obesity   [ ] Altered GI Function [ ] Unintended Weight Loss [ ] Food & Nutrition Related Knowledge Deficit [ ] Malnutrition     Nutrition Diagnosis is [ ] ongoing  [ ] resolved [ ] not applicable     New Nutrition Diagnosis: [ ] not applicable       Interventions:   Recommend  [ ] Change Diet To:  [ ] Nutrition Supplement  [ ] Nutrition Support  [ ] Other:     Monitoring and Evaluation:   [ ] PO intake [ x ] Tolerance to diet prescription [ x ] weights [ x ] labs[ x ] follow up per protocol  [ ] other: Assessment: Pt admitted from [facility] c  pna, CHF exacerbation. Hx COPD, dementia, CVA, parkinson's. Agree c previous RD nutrition dx of malnutrition: NFPE reveals  severe depletion muscle and fat  Pt was assessed by SLP and NPO recommended. Family meeting pending to determine if enteral feeding desired.     Factors impacting intake: [ ] none [ ] nausea  [ ] vomiting [ ] diarrhea [ ] constipation  [ ]chewing problems [ x] swallowing issues  [ ] other:     Diet Presciption: Diet, NPO:   Except Medications (02-17-18 @ 21:36)    Intake: N/A NPO    Current Weight: Weight (kg): 45.4 (02-17 @ 18:32)  % Weight Change    Pertinent Medications: MEDICATIONS  (STANDING):  ALBUTerol    0.083% 2.5 milliGRAM(s) Nebulizer every 6 hours  buDESOnide 160 MICROgram(s)/formoterol 4.5 MICROgram(s) Inhaler 2 Puff(s) Inhalation two times a day  levothyroxine Injectable 50 MICROGram(s) IV Push <User Schedule>  methylPREDNISolone sodium succinate Injectable 40 milliGRAM(s) IV Push daily  pantoprazole  Injectable 40 milliGRAM(s) IV Push daily    MEDICATIONS  (PRN):  acetaminophen  Suppository 650 milliGRAM(s) Rectal every 6 hours PRN For Temp greater than 38 C (100.4 F)  acetaminophen  Suppository. 650 milliGRAM(s) Rectal every 6 hours PRN Moderate Pain (4 - 6)  hydrALAZINE Injectable 10 milliGRAM(s) IV Push every 4 hours PRN htn  metoprolol    tartrate Injectable 5 milliGRAM(s) IV Push every 6 hours PRN htn    Pertinent Labs: 02-21 Na148 mmol/L<H> Glu 81 mg/dL K+ 3.7 mmol/L Cr  1.22 mg/dL BUN 30 mg/dL<H> Phos n/a   Alb n/a   PAB n/a        CAPILLARY BLOOD GLUCOSE        Skin:     Estimated Needs:   x] no change since previous assessment  [ ] recalculated:     Previous Nutrition Diagnosis:   [ ] Inadequate Energy Intake [ ]Inadequate Oral Intake [ ] Excessive Energy Intake   [ ] Underweight [ ] Increased Nutrient Needs [ ] Overweight/Obesity   [ ] Altered GI Function [ ] Unintended Weight Loss [ ] Food & Nutrition Related Knowledge Deficit [x ] Malnutrition     Nutrition Diagnosis is [x ] ongoing  [ ] resolved [ ] not applicable     New Nutrition Diagnosis: [ ] not applicable       Interventions:   Recommend  [ ] Change Diet To:  [ ] Nutrition Supplement  [ ] Nutrition Support  [x ] Other: Family to decide on feeding tube vs comfort care    Monitoring and Evaluation:   [ ] PO intake [ x ] Tolerance to diet prescription [ x ] weights [ x ] labs[ x ] follow up per protocol  [ ] other: Assessment: Pt admitted from [facility] c  pna, CHF exacerbation. Hx COPD, dementia, CVA, parkinson's. Agree c previous RD nutrition dx of malnutrition: NFPE reveals  severe depletion muscle and fat  Pt was assessed by SLP and NPO recommended. Family meeting pending to determine if enteral feeding desired.     Factors impacting intake: [ ] none [ ] nausea  [ ] vomiting [ ] diarrhea [ ] constipation  [ ]chewing problems [ x] swallowing issues  [ ] other:     Diet Presciption: Diet, NPO:   Except Medications (02-17-18 @ 21:36)    Intake: N/A NPO    Current Weight: Weight (kg): 45.4 (02-17 @ 18:32)  % Weight Change admit 99# 2/20 97# (-2.0#) 2% significant loss    Pertinent Medications: MEDICATIONS  (STANDING):  ALBUTerol    0.083% 2.5 milliGRAM(s) Nebulizer every 6 hours  buDESOnide 160 MICROgram(s)/formoterol 4.5 MICROgram(s) Inhaler 2 Puff(s) Inhalation two times a day  levothyroxine Injectable 50 MICROGram(s) IV Push <User Schedule>  methylPREDNISolone sodium succinate Injectable 40 milliGRAM(s) IV Push daily  pantoprazole  Injectable 40 milliGRAM(s) IV Push daily    MEDICATIONS  (PRN):  acetaminophen  Suppository 650 milliGRAM(s) Rectal every 6 hours PRN For Temp greater than 38 C (100.4 F)  acetaminophen  Suppository. 650 milliGRAM(s) Rectal every 6 hours PRN Moderate Pain (4 - 6)  hydrALAZINE Injectable 10 milliGRAM(s) IV Push every 4 hours PRN htn  metoprolol    tartrate Injectable 5 milliGRAM(s) IV Push every 6 hours PRN htn    Pertinent Labs: 02-21 Na148 mmol/L<H> Glu 81 mg/dL K+ 3.7 mmol/L Cr  1.22 mg/dL BUN 30 mg/dL<H> Phos n/a   Alb n/a   PAB n/a        CAPILLARY BLOOD GLUCOSE        Skin: sacral p/u stage I, L heel stage I    Estimated Needs:   x] no change since previous assessment  [ ] recalculated:     Previous Nutrition Diagnosis:   [ ] Inadequate Energy Intake [ ]Inadequate Oral Intake [ ] Excessive Energy Intake   [ ] Underweight [ ] Increased Nutrient Needs [ ] Overweight/Obesity   [ ] Altered GI Function [ ] Unintended Weight Loss [ ] Food & Nutrition Related Knowledge Deficit [x ] Malnutrition     Nutrition Diagnosis is [x ] ongoing  [ ] resolved [ ] not applicable     New Nutrition Diagnosis: [ ] not applicable       Interventions:   Recommend  [ ] Change Diet To:  [ ] Nutrition Supplement  [ ] Nutrition Support  [x ] Other: Family to decide on feeding tube vs comfort care    Monitoring and Evaluation:   [ ] PO intake [ x ] Tolerance to diet prescription [ x ] weights [ x ] labs[ x ] follow up per protocol  [ ] other:

## 2018-02-21 NOTE — PROGRESS NOTE ADULT - SUBJECTIVE AND OBJECTIVE BOX
PULMONARY/CRITICAL CARE      INTERVAL HPI/OVERNIGHT EVENTS:  Less sob. No fever  89y FemaleHPI:  89 yr old with PMH of Dementia,depression,severe aortic incompetence ,, CVA- haemorruagic stroke in 2005,severe GI bleed 4 yrs back,, gait disbility following CVA, parkinson hypothyroid, lactose intolerant,allergic to penicillin and contrast dye,,   CHF systolic  diastolic with copd  asymptomatic bacteruria, with h/o ESBL e coli colonization, with  fraility , CKD 3., GERD.   patient had thoracentesis on 11/20, and it is transudative    brought back for woesening lethargy, poor po intake diarrhea,  IN ED suspected HCAP started on vanc and aztreonam, being admitted for further care (17 Feb 2018 21:15)        PAST MEDICAL & SURGICAL HISTORY:  CLL (chronic lymphocytic leukemia)  Dementia with behavioral disturbance, unspecified dementia type  Gastrointestinal hemorrhage, unspecified gastrointestinal hemorrhage type  Nontraumatic intracerebral hemorrhage, unspecified cerebral location, unspecified laterality  Rhinitis  Anxiety  GERD (gastroesophageal reflux disease)  Hypothyroidism  Coronary artery disease  COPD (chronic obstructive pulmonary disease)  Closed fracture of neck of left femur, initial encounter        ICU Vital Signs Last 24 Hrs  T(C): 36.7 (21 Feb 2018 09:47), Max: 36.9 (20 Feb 2018 14:44)  T(F): 98 (21 Feb 2018 09:47), Max: 98.4 (20 Feb 2018 14:44)  HR: 68 (21 Feb 2018 09:47) (68 - 86)  BP: 190/60 (21 Feb 2018 09:47) (154/77 - 190/60)  BP(mean): --  ABP: --  ABP(mean): --  RR: 18 (21 Feb 2018 09:47) (18 - 20)  SpO2: 97% (21 Feb 2018 09:47) (95% - 98%)    Qtts:     I&O's Summary      REVIEW OF SYSTEMS:    CONSTITUTIONAL: No fever, weight loss, or fatigue  EYES: No eye pain, visual disturbances, or discharge  ENMT:  No difficulty hearing, tinnitus, vertigo; No sinus or throat pain  NECK: No pain or stiffness  BREASTS: No pain, masses, or nipple discharge  RESPIRATORY: has cough, wheezing, no chills or hemoptysis; has shortness of breath  CARDIOVASCULAR: No chest pain, palpitations, dizziness, or leg swelling  GASTROINTESTINAL: No abdominal or epigastric pain. No nausea, vomiting, or hematemesis; No diarrhea or constipation. No melena or hematochezia.  GENITOURINARY: No dysuria, frequency, hematuria, or incontinence  NEUROLOGICAL: No headaches,  numbness, or tremors  SKIN: No itching, burning, rashes, or lesions   LYMPH NODES: No enlarged glands  ENDOCRINE: No heat or cold intolerance; No hair loss  MUSCULOSKELETAL: No joint pain or swelling; No muscle, back, or extremity pain, no calf tenderness  PSYCHIATRIC: No depression, anxiety, mood swings, or difficulty sleeping  HEME/LYMPH: No easy bruising, or bleeding gums  ALLERGY AND IMMUNOLOGIC: No hives or eczema      PHYSICAL EXAM:    GENERAL: no resp distress, thin female, well-groomed, well-developed,   HEAD:  Atraumatic, Normocephalic  EYES: EOMI, PERRLA, conjunctiva and sclera clear  ENMT: No tonsillar erythema, exudates, or enlargement; Moist mucous membranes, Good dentition, No lesions  NECK: Supple, No JVD, Normal thyroid  NERVOUS SYSTEM:  Alert confused Motor Strength 5/5 B/L upper and lower extremities  CHEST/LUNG: decreased bilat, rhonchi, wheezing, good excursion, no rubs  HEART: Regular rate and rhythm; No murmurs, rubs, or gallops  ABDOMEN: Soft, Nontender, Nondistended; Bowel sounds present  EXTREMITIES:  2+ Peripheral Pulses, No clubbing, cyanosis, or edema  LYMPH: No lymphadenopathy noted  SKIN: No rashes or lesions              LABS:                        10.8   19.4  )-----------( 198      ( 21 Feb 2018 06:56 )             34.1     02-21    148<H>  |  110<H>  |  30<H>  ----------------------------<  81  3.7   |  26  |  1.22    Ca    8.2<L>      21 Feb 2018 06:56            vanco through     RADIOLOGY & ADDITIONAL STUDIES:      CRITICAL CARE TIME SPENT:

## 2018-02-21 NOTE — PROGRESS NOTE ADULT - SUBJECTIVE AND OBJECTIVE BOX
Patient is a 89y Female with a known history of :  Oropharyngeal dysphagia (R13.12)  Acute respiratory failure with hypoxia (J96.01)  COPD (chronic obstructive pulmonary disease) (J44.9)  Sinus bradycardia (R00.1)  Valvular heart disease (I38)  Diarrhea, unspecified type (R19.7)  Gastroesophageal reflux disease without esophagitis (K21.9)  Hypothyroidism, unspecified type (E03.9)  CLL (chronic lymphocytic leukemia) (C91.10)  Dementia with behavioral disturbance, unspecified dementia type (F03.91)  HTN (hypertension), benign (I10)  Coronary artery disease involving native coronary artery of native heart without angina pectoris (I25.10)  PNA (pneumonia) (J18.9)    HPI:  89 yr old with PMH of Dementia,depression,severe aortic incompetence ,, CVA- haemorruagic stroke in 2005,severe GI bleed 4 yrs back,, gait disbility following CVA, parkinson hypothyroid, lactose intolerant,allergic to penicillin and contrast dye,,   CHF systolic  diastolic with copd  asymptomatic bacteruria, with h/o ESBL e coli colonization, with  fraility , CKD 3., GERD.   patient had thoracentesis on 11/20, and it is transudative    brought back for woesening lethargy, poor po intake diarrhea,  IN ED suspected HCAP started on vanc and aztreonam, being admitted for further care (17 Feb 2018 21:15)          MEDICATIONS  (STANDING):  ALBUTerol    0.083% 2.5 milliGRAM(s) Nebulizer every 6 hours  buDESOnide 160 MICROgram(s)/formoterol 4.5 MICROgram(s) Inhaler 2 Puff(s) Inhalation two times a day  levothyroxine Injectable 50 MICROGram(s) IV Push <User Schedule>  methylPREDNISolone sodium succinate Injectable 40 milliGRAM(s) IV Push two times a day  pantoprazole  Injectable 40 milliGRAM(s) IV Push daily    MEDICATIONS  (PRN):  acetaminophen  Suppository 650 milliGRAM(s) Rectal every 6 hours PRN For Temp greater than 38 C (100.4 F)  acetaminophen  Suppository. 650 milliGRAM(s) Rectal every 6 hours PRN Moderate Pain (4 - 6)  hydrALAZINE Injectable 10 milliGRAM(s) IV Push every 4 hours PRN htn  metoprolol    tartrate Injectable 5 milliGRAM(s) IV Push every 6 hours PRN htn      ALLERGIES: IV Contrast (Unknown)  penicillin (Unknown)      FAMILY HISTORY:      Social history:  Alochol:   Smoking:   Drug Use:   Marital Status:     PHYSICAL EXAMINATION:  -----------------------------  T(C): 36.8 (02-21-18 @ 05:12), Max: 36.9 (02-20-18 @ 14:44)  HR: 74 (02-21-18 @ 05:12) (69 - 90)  BP: 184/72 (02-21-18 @ 05:12) (154/77 - 184/72)  RR: 18 (02-21-18 @ 05:12) (18 - 20)  SpO2: 98% (02-21-18 @ 05:12) (95% - 98%)  Wt(kg): --        Constitutional: lying in bed, not very alert.  Appears to be failing   HEENT: normal oral mucosa, no oral pallor and no oral cyanosis.   Neck: normal jugular venous A waves present, normal jugular venous V waves present and no jugular venous leong A waves.   Pulmonary: fair air flow  Cardiovascular: S1S2 distant  Musculoskeletal: the gait could not be assessed..   Extremities: no clubbing of the fingernails, no localized cyanosis, no petechial hemorrhages and no ischemic changes.   Skin: dry.  Tongue dry    LABS:   --------  CBC Full  -  ( 21 Feb 2018 06:56 )  WBC Count : x  Hemoglobin : 10.8 g/dL  Hematocrit : 34.1 %  Platelet Count - Automated : 198 K/uL  Mean Cell Volume : 89.2 fl  Mean Cell Hemoglobin : 28.3 pg  Mean Cell Hemoglobin Concentration : 31.7 gm/dL  Auto Neutrophil # : x  Auto Lymphocyte # : x  Auto Monocyte # : x  Auto Eosinophil # : x  Auto Basophil # : x  Auto Neutrophil % : x  Auto Lymphocyte % : x  Auto Monocyte % : x  Auto Eosinophil % : x  Auto Basophil % : x      02-21    148<H>  |  110<H>  |  30<H>  ----------------------------<  81  3.7   |  26  |  1.22    Ca    8.2<L>      21 Feb 2018 06:56         2/21/2018:  On Floor bed  Failed speech and swallow  Peg declined by family.  Possible comfort care.  B/P on occasion elevated.              Radiology:

## 2018-02-21 NOTE — PROGRESS NOTE ADULT - SUBJECTIVE AND OBJECTIVE BOX
Patient is a 89y old  Female who presents with a chief complaint of AMS/Lethargic (18 Feb 2018 01:56)      INTERVAL HPI/OVERNIGHT EVENTS:no acute event    Home Medications:  bacitracin 500 units/g topical ointment: Apply topically to affected area once a day (19 Nov 2017 04:37)  buPROPion 150 mg/24 hours (XL) oral tablet, extended release: 1 tab(s) orally once a day (19 Nov 2017 04:37)  Calcium 600+D 600 mg-200 intl units oral tablet: 1 tab(s) orally once a day (19 Nov 2017 04:37)  carbidopa-levodopa 25 mg-100 mg oral tablet: 1 tab(s) orally once a day (19 Nov 2017 04:37)  carvedilol 3.125 mg oral tablet: 1 tab(s) orally every 12 hours (19 Nov 2017 04:37)  DuoNeb 0.5 mg-2.5 mg/3 mL inhalation solution: 3 milliliter(s) inhaled 2 times a day (19 Nov 2017 04:37)  fluticasone 50 mcg/inh nasal spray: 1 spray(s) in each nostril once a day (19 Nov 2017 04:37)  isosorbide dinitrate 30 mg oral tablet: 1 tab(s) orally once a day (19 Nov 2017 04:37)  Multiple Vitamins oral tablet: 1 tab(s) orally once a day (19 Nov 2017 04:37)  omeprazole 20 mg oral delayed release capsule: 1 cap(s) orally once a day (19 Nov 2017 04:37)  Symbicort 160 mcg-4.5 mcg/inh inhalation aerosol: 2 puff(s) inhaled 2 times a day (19 Nov 2017 04:37)      MEDICATIONS  (STANDING):  ALBUTerol    0.083% 2.5 milliGRAM(s) Nebulizer every 6 hours  buDESOnide 160 MICROgram(s)/formoterol 4.5 MICROgram(s) Inhaler 2 Puff(s) Inhalation two times a day  levothyroxine Injectable 50 MICROGram(s) IV Push <User Schedule>  methylPREDNISolone sodium succinate Injectable 40 milliGRAM(s) IV Push two times a day  pantoprazole  Injectable 40 milliGRAM(s) IV Push daily    MEDICATIONS  (PRN):  acetaminophen  Suppository 650 milliGRAM(s) Rectal every 6 hours PRN For Temp greater than 38 C (100.4 F)  acetaminophen  Suppository. 650 milliGRAM(s) Rectal every 6 hours PRN Moderate Pain (4 - 6)  hydrALAZINE Injectable 10 milliGRAM(s) IV Push every 4 hours PRN htn  metoprolol    tartrate Injectable 5 milliGRAM(s) IV Push every 6 hours PRN htn      Allergies    IV Contrast (Unknown)  penicillin (Unknown)    Intolerances        REVIEW OF SYSTEMS:  unable as pt confused refusing to eat drink, unable to swallow  Vital Signs Last 24 Hrs  T(C): 36.7 (21 Feb 2018 09:47), Max: 36.9 (20 Feb 2018 14:44)  T(F): 98 (21 Feb 2018 09:47), Max: 98.4 (20 Feb 2018 14:44)  HR: 68 (21 Feb 2018 09:47) (68 - 86)  BP: 190/60 (21 Feb 2018 09:47) (154/77 - 190/60)  BP(mean): --  RR: 18 (21 Feb 2018 09:47) (18 - 20)  SpO2: 97% (21 Feb 2018 09:47) (95% - 98%)    PHYSICAL EXAM:  GENERAL: lethargic, confused  HEAD:  Atraumatic, Normocephalic  EYES: EOMI, PERRLA, conjunctiva and sclera clear  ENMT: dry mucous membranes,   NECK: Supple, No JVD, Normal thyroid  NERVOUS SYSTEM:  confused Motor Strength 3/5 B/L upper and lower extremities; DTRs 2+ intact and symmetric  CHEST/LUNG: BS decreased at bases  HEART: Regular rate and rhythm; No murmurs, rubs, or gallops  ABDOMEN: Soft, Nontender, Nondistended; Bowel sounds present  EXTREMITIES:  2+ Peripheral Pulses, No clubbing, cyanosis, or edema  LYMPH: No lymphadenopathy noted  SKIN: No rashes or lesions    LABS:                        10.8   19.4  )-----------( 198      ( 21 Feb 2018 06:56 )             34.1     02-21    148<H>  |  110<H>  |  30<H>  ----------------------------<  81  3.7   |  26  |  1.22    Ca    8.2<L>      21 Feb 2018 06:56          CAPILLARY BLOOD GLUCOSE            Culture - Blood (collected 02-18-18 @ 01:06)  Source: .Blood Blood-Peripheral  Preliminary Report (02-19-18 @ 02:02):    No growth to date.    Culture - Blood (collected 02-18-18 @ 01:06)  Source: .Blood Blood-Peripheral  Preliminary Report (02-19-18 @ 02:02):    No growth to date.    Culture - Urine (collected 02-17-18 @ 22:14)  Source: .Urine Catheterized  Final Report (02-18-18 @ 23:24):    No growth  < from: Xray Chest 1 View- PORTABLE-Routine (02.21.18 @ 06:41) >  here is pulmonary vascular congestion and interstitial edema   and small to moderate bilateral pleural effusions consistent with an   aggressive heart failure.    When compared with the previous chest film, there is no significant   interval change.      < end of copied text >        RADIOLOGY & ADDITIONAL TESTS:    Imaging Personally Reviewed:  [x ] YES  [ ] NO    Consultant(s) Notes Reviewed:  [x ] YES  [ ] NO    Care Discussed with Consultants/Other Providers [x ] YES  [ ] NO

## 2018-02-22 ENCOUNTER — TRANSCRIPTION ENCOUNTER (OUTPATIENT)
Age: 83
End: 2018-02-22

## 2018-02-22 VITALS
HEART RATE: 67 BPM | SYSTOLIC BLOOD PRESSURE: 170 MMHG | TEMPERATURE: 98 F | RESPIRATION RATE: 18 BRPM | DIASTOLIC BLOOD PRESSURE: 85 MMHG | OXYGEN SATURATION: 98 %

## 2018-02-22 LAB
ANION GAP SERPL CALC-SCNC: 11 MMOL/L — SIGNIFICANT CHANGE UP (ref 5–17)
BUN SERPL-MCNC: 32 MG/DL — HIGH (ref 7–23)
CALCIUM SERPL-MCNC: 8.6 MG/DL — SIGNIFICANT CHANGE UP (ref 8.4–10.5)
CHLORIDE SERPL-SCNC: 110 MMOL/L — HIGH (ref 96–108)
CO2 SERPL-SCNC: 26 MMOL/L — SIGNIFICANT CHANGE UP (ref 22–31)
CREAT SERPL-MCNC: 1.05 MG/DL — SIGNIFICANT CHANGE UP (ref 0.5–1.3)
GLUCOSE SERPL-MCNC: 69 MG/DL — LOW (ref 70–99)
HCT VFR BLD CALC: 32.7 % — LOW (ref 34.5–45)
HGB BLD-MCNC: 10.5 G/DL — LOW (ref 11.5–15.5)
MCHC RBC-ENTMCNC: 28.4 PG — SIGNIFICANT CHANGE UP (ref 27–34)
MCHC RBC-ENTMCNC: 32 GM/DL — SIGNIFICANT CHANGE UP (ref 32–36)
MCV RBC AUTO: 88.7 FL — SIGNIFICANT CHANGE UP (ref 80–100)
PLATELET # BLD AUTO: 180 K/UL — SIGNIFICANT CHANGE UP (ref 150–400)
POTASSIUM SERPL-MCNC: 3.7 MMOL/L — SIGNIFICANT CHANGE UP (ref 3.5–5.3)
POTASSIUM SERPL-SCNC: 3.7 MMOL/L — SIGNIFICANT CHANGE UP (ref 3.5–5.3)
RBC # BLD: 3.69 M/UL — LOW (ref 3.8–5.2)
RBC # FLD: 15.9 % — HIGH (ref 10.3–14.5)
SODIUM SERPL-SCNC: 147 MMOL/L — HIGH (ref 135–145)
WBC # BLD: 17 K/UL — HIGH (ref 3.8–10.5)
WBC # FLD AUTO: 17 K/UL — HIGH (ref 3.8–10.5)

## 2018-02-22 PROCEDURE — 82803 BLOOD GASES ANY COMBINATION: CPT

## 2018-02-22 PROCEDURE — 97162 PT EVAL MOD COMPLEX 30 MIN: CPT

## 2018-02-22 PROCEDURE — 84443 ASSAY THYROID STIM HORMONE: CPT

## 2018-02-22 PROCEDURE — 97530 THERAPEUTIC ACTIVITIES: CPT

## 2018-02-22 PROCEDURE — 74018 RADEX ABDOMEN 1 VIEW: CPT

## 2018-02-22 PROCEDURE — 84145 PROCALCITONIN (PCT): CPT

## 2018-02-22 PROCEDURE — 84480 ASSAY TRIIODOTHYRONINE (T3): CPT

## 2018-02-22 PROCEDURE — 83880 ASSAY OF NATRIURETIC PEPTIDE: CPT

## 2018-02-22 PROCEDURE — 99285 EMERGENCY DEPT VISIT HI MDM: CPT | Mod: 25

## 2018-02-22 PROCEDURE — 97110 THERAPEUTIC EXERCISES: CPT

## 2018-02-22 PROCEDURE — 86901 BLOOD TYPING SEROLOGIC RH(D): CPT

## 2018-02-22 PROCEDURE — 36600 WITHDRAWAL OF ARTERIAL BLOOD: CPT

## 2018-02-22 PROCEDURE — 94664 DEMO&/EVAL PT USE INHALER: CPT

## 2018-02-22 PROCEDURE — 85027 COMPLETE CBC AUTOMATED: CPT

## 2018-02-22 PROCEDURE — 80048 BASIC METABOLIC PNL TOTAL CA: CPT

## 2018-02-22 PROCEDURE — 94640 AIRWAY INHALATION TREATMENT: CPT

## 2018-02-22 PROCEDURE — 81001 URINALYSIS AUTO W/SCOPE: CPT

## 2018-02-22 PROCEDURE — 71045 X-RAY EXAM CHEST 1 VIEW: CPT

## 2018-02-22 PROCEDURE — 86140 C-REACTIVE PROTEIN: CPT

## 2018-02-22 PROCEDURE — 96360 HYDRATION IV INFUSION INIT: CPT

## 2018-02-22 PROCEDURE — 84436 ASSAY OF TOTAL THYROXINE: CPT

## 2018-02-22 PROCEDURE — 86900 BLOOD TYPING SEROLOGIC ABO: CPT

## 2018-02-22 PROCEDURE — 70450 CT HEAD/BRAIN W/O DYE: CPT

## 2018-02-22 PROCEDURE — 83605 ASSAY OF LACTIC ACID: CPT

## 2018-02-22 PROCEDURE — 85730 THROMBOPLASTIN TIME PARTIAL: CPT

## 2018-02-22 PROCEDURE — 80061 LIPID PANEL: CPT

## 2018-02-22 PROCEDURE — 87040 BLOOD CULTURE FOR BACTERIA: CPT

## 2018-02-22 PROCEDURE — 85610 PROTHROMBIN TIME: CPT

## 2018-02-22 PROCEDURE — 87086 URINE CULTURE/COLONY COUNT: CPT

## 2018-02-22 PROCEDURE — 80053 COMPREHEN METABOLIC PANEL: CPT

## 2018-02-22 PROCEDURE — 86850 RBC ANTIBODY SCREEN: CPT

## 2018-02-22 RX ORDER — ATROPINE SULFATE 1 %
1 DROPS OPHTHALMIC (EYE) EVERY 8 HOURS
Qty: 0 | Refills: 0 | Status: DISCONTINUED | OUTPATIENT
Start: 2018-02-22 | End: 2018-02-22

## 2018-02-22 RX ORDER — FLUTICASONE PROPIONATE 50 MCG
1 SPRAY, SUSPENSION NASAL
Qty: 0 | Refills: 0 | COMMUNITY

## 2018-02-22 RX ORDER — BUDESONIDE AND FORMOTEROL FUMARATE DIHYDRATE 160; 4.5 UG/1; UG/1
2 AEROSOL RESPIRATORY (INHALATION)
Qty: 0 | Refills: 0 | COMMUNITY

## 2018-02-22 RX ORDER — MORPHINE SULFATE 50 MG/1
2 CAPSULE, EXTENDED RELEASE ORAL EVERY 4 HOURS
Qty: 0 | Refills: 0 | Status: DISCONTINUED | OUTPATIENT
Start: 2018-02-22 | End: 2018-02-22

## 2018-02-22 RX ORDER — IPRATROPIUM/ALBUTEROL SULFATE 18-103MCG
3 AEROSOL WITH ADAPTER (GRAM) INHALATION
Qty: 0 | Refills: 0 | COMMUNITY

## 2018-02-22 RX ORDER — OMEPRAZOLE 10 MG/1
1 CAPSULE, DELAYED RELEASE ORAL
Qty: 0 | Refills: 0 | COMMUNITY

## 2018-02-22 RX ORDER — BACITRACIN ZINC 500 UNIT/G
1 OINTMENT IN PACKET (EA) TOPICAL
Qty: 0 | Refills: 0 | COMMUNITY

## 2018-02-22 RX ADMIN — ALBUTEROL 2.5 MILLIGRAM(S): 90 AEROSOL, METERED ORAL at 07:45

## 2018-02-22 RX ADMIN — Medication 40 MILLIGRAM(S): at 06:37

## 2018-02-22 RX ADMIN — MORPHINE SULFATE 2 MILLIGRAM(S): 50 CAPSULE, EXTENDED RELEASE ORAL at 09:51

## 2018-02-22 RX ADMIN — MORPHINE SULFATE 2 MILLIGRAM(S): 50 CAPSULE, EXTENDED RELEASE ORAL at 11:08

## 2018-02-22 RX ADMIN — BUDESONIDE AND FORMOTEROL FUMARATE DIHYDRATE 2 PUFF(S): 160; 4.5 AEROSOL RESPIRATORY (INHALATION) at 06:36

## 2018-02-22 RX ADMIN — Medication 50 MICROGRAM(S): at 06:36

## 2018-02-22 RX ADMIN — MORPHINE SULFATE 2 MILLIGRAM(S): 50 CAPSULE, EXTENDED RELEASE ORAL at 15:13

## 2018-02-22 RX ADMIN — Medication 10 MILLIGRAM(S): at 06:37

## 2018-02-22 NOTE — DISCHARGE NOTE ADULT - SECONDARY DIAGNOSIS.
CLL (chronic lymphocytic leukemia) Chronic obstructive pulmonary disease, unspecified COPD type Dementia with behavioral disturbance, unspecified dementia type Oropharyngeal dysphagia

## 2018-02-22 NOTE — PROGRESS NOTE ADULT - NSHPATTENDINGPLANDISCUSS_GEN_ALL_CORE
son, staff, Juan
Rt and nursing in detail
son, staff, Juan
son, staff, Juan
son, staff, dr chen, family
son, staff, dr chen, family,

## 2018-02-22 NOTE — PROGRESS NOTE ADULT - PROBLEM SELECTOR PROBLEM 7
Hypothyroidism, unspecified type
Gastroesophageal reflux disease without esophagitis
Hypothyroidism, unspecified type
Hypothyroidism, unspecified type
Gastroesophageal reflux disease without esophagitis

## 2018-02-22 NOTE — PROGRESS NOTE ADULT - PROBLEM SELECTOR PLAN 2
vanc aztreonam, stopped, nebs as needed, o2 as needed, pulmonary consult noted, ID consult noted
Antibiotics
Aortic insufficiency.  No intervention planned
Occasional elevated readings.  At this time, just monitor the BP
cont cardiac meds avoid fluid overload, cardio consult
vanc aztreonam, stopped, nebs as needed, o2 as needed, pulmonary consult noted, ID consult noted, ruled out PNA
vanc aztreonam, stopped, nebs as needed, o2 as needed, pulmonary consult noted, ID consult noted, ruled out PNA
cont cardiac meds avoid fluid overload, cardio consult

## 2018-02-22 NOTE — DISCHARGE NOTE ADULT - MEDICATION SUMMARY - MEDICATIONS TO STOP TAKING
I will STOP taking the medications listed below when I get home from the hospital:    Calcium 600+D 600 mg-200 intl units oral tablet  -- 1 tab(s) by mouth once a day    Multiple Vitamins oral tablet  -- 1 tab(s) by mouth once a day    omeprazole 20 mg oral delayed release capsule  -- 1 cap(s) by mouth once a day    fluticasone 50 mcg/inh nasal spray  -- 1 spray(s) in each nostril once a day    DuoNeb 0.5 mg-2.5 mg/3 mL inhalation solution  -- 3 milliliter(s) inhaled 2 times a day    Symbicort 160 mcg-4.5 mcg/inh inhalation aerosol  -- 2 puff(s) inhaled 2 times a day    bacitracin 500 units/g topical ointment  -- Apply on skin to affected area once a day    isosorbide dinitrate 30 mg oral tablet  -- 1 tab(s) by mouth once a day    buPROPion 150 mg/24 hours (XL) oral tablet, extended release  -- 1 tab(s) by mouth once a day    carbidopa-levodopa 25 mg-100 mg oral tablet  -- 1 tab(s) by mouth once a day    carvedilol 3.125 mg oral tablet  -- 1 tab(s) by mouth every 12 hours    levothyroxine 100 mcg (0.1 mg) oral tablet  -- 1 tab(s) by mouth once a day    potassium chloride 20 mEq/15 mL oral liquid  -- 15 milliliter(s) by mouth 2 times a day    hydrALAZINE 50 mg oral tablet  -- 1 tab(s) by mouth every 8 hours

## 2018-02-22 NOTE — PROGRESS NOTE ADULT - PROBLEM SELECTOR PROBLEM 6
CLL (chronic lymphocytic leukemia)
Hypothyroidism, unspecified type
Hypothyroidism, unspecified type

## 2018-02-22 NOTE — PROGRESS NOTE ADULT - SUBJECTIVE AND OBJECTIVE BOX
LATOSHA MOONEY is a 89yFemale , patient examined and chart reviewed. Patient seen and examined today being followed for respiratory distress and chf exacerbation       INTERVAL HPI/ OVERNIGHT EVENTS: Events noted,, now placed on comfort care and referred to hospice     PAST MEDICAL & SURGICAL HISTORY:  CLL (chronic lymphocytic leukemia)  Dementia with behavioral disturbance, unspecified dementia type  Gastrointestinal hemorrhage, unspecified gastrointestinal hemorrhage type  Nontraumatic intracerebral hemorrhage, unspecified cerebral location, unspecified laterality  Rhinitis  Anxiety  GERD (gastroesophageal reflux disease)  Hypothyroidism  Coronary artery disease  COPD (chronic obstructive pulmonary disease)  Closed fracture of neck of left femur, initial encounter      For details regarding the patient's social history, family history, and other miscellaneous elements, please refer the initial infectious diseases consultation and/or the admitting history and physical examination for this admission.    ROS:  Unable to obtain due to : dementia       Allergies:  IV Contrast (Unknown)  penicillin (Unknown)      Current inpatient medications :  MEDICATIONS  (STANDING):    MEDICATIONS  (PRN):  atropine 1% Ophthalmic Solution for SubLingual Use 1 Drop(s) SubLingual every 8 hours PRN secretions  LORazepam    Concentrate 1 milliGRAM(s) Oral every 6 hours PRN Anxiety  morphine  - Injectable 2 milliGRAM(s) IV Push every 4 hours PRN pain, sob, anxiety      Objective:  Vital Signs Last 24 Hrs  T(C): 36.4 (22 Feb 2018 09:05), Max: 37 (22 Feb 2018 05:38)  T(F): 97.5 (22 Feb 2018 09:05), Max: 98.6 (22 Feb 2018 05:38)  HR: 70 (22 Feb 2018 09:05) (66 - 90)  BP: 173/65 (22 Feb 2018 09:05) (150/61 - 192/76)  BP(mean): --  RR: 17 (22 Feb 2018 09:05) (17 - 20)  SpO2: 97% (22 Feb 2018 09:05) (92% - 97%)    Physical Exam:  General: elderly cachectic female in mild cute distress  Eyes: sclera anicteric, pupils equal and reactive to light  ENMT: buccal mucosa dry, pharynx retained secretions   Neck: supple, trachea midline  Lungs: bilateral coarse rhonchi  Cardiovascular: regular rate and rhythm, S1 S2  Abdomen: soft, nontender, no organomegaly present, bowel sounds normal  Neurological: awake, confused , Cranial Nerves II-XII grossly intact  Skin: no increased ecchymosis/petechiae/purpura  Lymph Nodes: no palpable cervical/supraclavicular lymph nodes enlargements  Extremities: no cyanosis/clubbing/edema    LABS:               10.8   19.4   )----------(  198       ( 21 Feb 2018 06:56 )               34.1      148    |  110    |  30     ----------------------------<  81         ( 21 Feb 2018 06:56 )  3.7     |  26     |  1.22     Ca    8.2        ( 21 Feb 2018 06:56 )      Procalcitonin, Serum (02.19.18 @ 05:14)    Procalcitonin, Serum: 0.44:    Serum Pro-Brain Natriuretic Peptide (02.18.18 @ 11:46)    Serum Pro-Brain Natriuretic Peptide: 86567 pg/mL    ABG - ( 19 Feb 2018 07:34 )  pH: 7.39    /  pCO2: 35    /  pO2: 158   / HCO3: 22    / Base Excess: -3.5  /  SaO2: 99        RECENT CULTURES:    Culture - Blood (collected 18 Feb 2018 01:06)  Source: .Blood Blood-Peripheral  Preliminary Report (19 Feb 2018 02:02):    No growth to date.    Culture - Blood (collected 18 Feb 2018 01:06)  Source: .Blood Blood-Peripheral  Preliminary Report (19 Feb 2018 02:02):    No growth to date.    Culture - Urine (collected 17 Feb 2018 22:14)  Source: .Urine Catheterized  Final Report (18 Feb 2018 23:24):    No growth      RADIOLOGY & ADDITIONAL STUDIES:  Xray Chest 1 View-PORTABLE IMMEDIATE (02.19.18 @ 08:10) >  Frontal expiratory view of the chest shows the heart to be similar in   size. Pulmonary vascularity shows more congestion. The lungs show   progression of pleural effusions and there is no evidence of pneumothorax.    IMPRESSION:  Congestive changes as described.    Thank you for the courtesy oftchis referral.    Assessment :  89 yr old with PMH of Dementia, depression, severe aortic stenosis, CHF, CVA, GIB, CLL  admitted with AMS, and  SOB likely CHF exacerbation  Leukocytosis reactive resolved  Doubt pnumonia  or UTI   Hx of colonization with ESBL E coli   CKD  Failure to thrive   high risk for aspiration   Leukocytosis secondary to steroids ?? aspiration     Plan :   - on comfort care measures only , await inpatient hospice placement   - consider taper and dc steroids   - needs diuretics   - will observe off antibiotics   -     Continue with present regime .  Appropriate use of antibiotics and adverse effects reviewed.    Advanced directives noted , She is DNR/DNI     I have discussed the above plan of care with patient's  family in detail. They expressed understanding of the  treatment plan . Risks, benefits and alternatives discussed in detail. I have asked if they have any questions or concerns and appropriately addressed them to the best of my ability .    I will sign off as she is on comfort care     > 25 minutes were spent in direct patient care reviewing notes, medications ,labs data/ imaging , discussion with multidisciplinary team.    Thank you for allowing me to participate in care of your patient .    Ethan Ochoa MD  [phone]

## 2018-02-22 NOTE — PROGRESS NOTE ADULT - PROBLEM SELECTOR PROBLEM 8
Diarrhea, unspecified type
Gastroesophageal reflux disease without esophagitis
Diarrhea, unspecified type
Gastroesophageal reflux disease without esophagitis
Gastroesophageal reflux disease without esophagitis

## 2018-02-22 NOTE — PROGRESS NOTE ADULT - ATTENDING COMMENTS
45 minutes spent on this visit, 50% visit time spent in care co-ordination with other attendings and counselling patient  I have discussed care plan with patient and HCP,expressed understanding of problems treatment and their effect and side effects, alternatives in detail,I have asked if they have any questions and concerns and appropriately addressed them to best of my ability  Reviewed all diagonostic tests, lab results and drug drug interactions, and medications
60 minutes spent on this visit, 50% visit time spent in care co-ordination with other attendings and counselling patient  I have discussed care plan with patient and HCP,expressed understanding of problems treatment and their effect and side effects, alternatives in detail,I have asked if they have any questions and concerns and appropriately addressed them to best of my ability  Reviewed all diagonostic tests, lab results and drug drug interactions, and medications

## 2018-02-22 NOTE — PROGRESS NOTE ADULT - SUBJECTIVE AND OBJECTIVE BOX
Patient is a 89y old  Female who presents with a chief complaint of AMS/Lethargic (18 Feb 2018 01:56)      INTERVAL HPI/OVERNIGHT EVENTS:no acute event    Home Medications:  bacitracin 500 units/g topical ointment: Apply topically to affected area once a day (19 Nov 2017 04:37)  buPROPion 150 mg/24 hours (XL) oral tablet, extended release: 1 tab(s) orally once a day (19 Nov 2017 04:37)  Calcium 600+D 600 mg-200 intl units oral tablet: 1 tab(s) orally once a day (19 Nov 2017 04:37)  carbidopa-levodopa 25 mg-100 mg oral tablet: 1 tab(s) orally once a day (19 Nov 2017 04:37)  carvedilol 3.125 mg oral tablet: 1 tab(s) orally every 12 hours (19 Nov 2017 04:37)  DuoNeb 0.5 mg-2.5 mg/3 mL inhalation solution: 3 milliliter(s) inhaled 2 times a day (19 Nov 2017 04:37)  fluticasone 50 mcg/inh nasal spray: 1 spray(s) in each nostril once a day (19 Nov 2017 04:37)  isosorbide dinitrate 30 mg oral tablet: 1 tab(s) orally once a day (19 Nov 2017 04:37)  Multiple Vitamins oral tablet: 1 tab(s) orally once a day (19 Nov 2017 04:37)  omeprazole 20 mg oral delayed release capsule: 1 cap(s) orally once a day (19 Nov 2017 04:37)  Symbicort 160 mcg-4.5 mcg/inh inhalation aerosol: 2 puff(s) inhaled 2 times a day (19 Nov 2017 04:37)      MEDICATIONS  (STANDING):    MEDICATIONS  (PRN):  atropine 1% Ophthalmic Solution for SubLingual Use 1 Drop(s) SubLingual every 8 hours PRN secretions  LORazepam    Concentrate 1 milliGRAM(s) Oral every 6 hours PRN Anxiety  morphine  - Injectable 2 milliGRAM(s) IV Push every 4 hours PRN pain, sob, anxiety      Allergies    IV Contrast (Unknown)  penicillin (Unknown)    Intolerances        REVIEW OF SYSTEMS:  unable,as pt confused and lethargic  Vital Signs Last 24 Hrs  T(C): 37 (22 Feb 2018 05:38), Max: 37 (22 Feb 2018 05:38)  T(F): 98.6 (22 Feb 2018 05:38), Max: 98.6 (22 Feb 2018 05:38)  HR: 74 (22 Feb 2018 07:45) (66 - 90)  BP: 174/73 (22 Feb 2018 05:38) (150/61 - 192/76)  BP(mean): --  RR: 17 (22 Feb 2018 05:38) (17 - 20)  SpO2: 95% (22 Feb 2018 07:45) (92% - 97%)    PHYSICAL EXAM:  GENERAL: lethargic  HEAD:  Atraumatic, Normocephalic  EYES: EOMI, PERRLA, conjunctiva and sclera clear  ENMT: Moist mucous membranes,   NECK: Supple, No JVD, Normal thyroid  NERVOUS SYSTEM:  confused, Motor Strength 3/5 B/L upper and lower extremities; DTRs 2+ intact and symmetric  CHEST/LUNG: Clear to percussion bilaterally; No rales, rhonchi, wheezing, or rubs  HEART: Regular rate and rhythm; No murmurs, rubs, or gallops  ABDOMEN: Soft, Nontender, Nondistended; Bowel sounds present  EXTREMITIES:  2+ Peripheral Pulses, No clubbing, cyanosis, or edema  LYMPH: No lymphadenopathy noted  SKIN: No rashes or lesions    LABS:                        10.5   17.0  )-----------( 180      ( 22 Feb 2018 07:01 )             32.7     02-22    147<H>  |  110<H>  |  32<H>  ----------------------------<  69<L>  3.7   |  26  |  1.05    Ca    8.6      22 Feb 2018 07:01          CAPILLARY BLOOD GLUCOSE            Culture - Blood (collected 02-18-18 @ 01:06)  Source: .Blood Blood-Peripheral  Preliminary Report (02-19-18 @ 02:02):    No growth to date.    Culture - Blood (collected 02-18-18 @ 01:06)  Source: .Blood Blood-Peripheral  Preliminary Report (02-19-18 @ 02:02):    No growth to date.    Culture - Urine (collected 02-17-18 @ 22:14)  Source: .Urine Catheterized  Final Report (02-18-18 @ 23:24):    No growth        RADIOLOGY & ADDITIONAL TESTS:    Imaging Personally Reviewed:  [x ] YES  [ ] NO    Consultant(s) Notes Reviewed:  [x ] YES  [ ] NO    Care Discussed with Consultants/Other Providers [ x] YES  [ ] NO

## 2018-02-22 NOTE — PROGRESS NOTE ADULT - PROBLEM SELECTOR PLAN 1
Appears dehydrated due to not eating.  Comfort Care contemplated.
Follow Pulmonary
HHN  Steroids
HHN  Steroids
HHN  Steroids  Check cxr
avoid overhydration, o2 as needed,
comfort measures, morphine prn
vanc aztreonam, nebs as needed, o2 as needed, pulmonary consult
vanc aztreonam, nebs as needed, o2 as needed, pulmonary consult noted, ID consult noted
avoid overhydration, o2 as needed,

## 2018-02-22 NOTE — PROGRESS NOTE ADULT - PROBLEM SELECTOR PROBLEM 2
PNA (pneumonia)
Coronary artery disease involving native coronary artery of native heart without angina pectoris
HTN (hypertension), benign
PNA (pneumonia)
Valvular heart disease
Coronary artery disease involving native coronary artery of native heart without angina pectoris

## 2018-02-22 NOTE — PROGRESS NOTE ADULT - PROBLEM SELECTOR PROBLEM 3
Coronary artery disease involving native coronary artery of native heart without angina pectoris
COPD (chronic obstructive pulmonary disease)
Coronary artery disease involving native coronary artery of native heart without angina pectoris
Coronary artery disease involving native coronary artery of native heart without angina pectoris
HTN (hypertension), benign

## 2018-02-22 NOTE — PROGRESS NOTE ADULT - SUBJECTIVE AND OBJECTIVE BOX
Patient is a 89y Female with a known history of :  Oropharyngeal dysphagia (R13.12)  Acute respiratory failure with hypoxia (J96.01)  COPD (chronic obstructive pulmonary disease) (J44.9)  Sinus bradycardia (R00.1)  Valvular heart disease (I38)  Diarrhea, unspecified type (R19.7)  Gastroesophageal reflux disease without esophagitis (K21.9)  Hypothyroidism, unspecified type (E03.9)  CLL (chronic lymphocytic leukemia) (C91.10)  Dementia with behavioral disturbance, unspecified dementia type (F03.91)  HTN (hypertension), benign (I10)  Coronary artery disease involving native coronary artery of native heart without angina pectoris (I25.10)  PNA (pneumonia) (J18.9)    HPI:  89 yr old with PMH of Dementia,depression,severe aortic incompetence ,, CVA- haemorruagic stroke in 2005,severe GI bleed 4 yrs back,, gait disbility following CVA, parkinson hypothyroid, lactose intolerant,allergic to penicillin and contrast dye,,   CHF systolic  diastolic with copd  asymptomatic bacteruria, with h/o ESBL e coli colonization, with  fraility , CKD 3., GERD.   patient had thoracentesis on 11/20, and it is transudative    brought back for woesening lethargy, poor po intake diarrhea,  IN ED suspected HCAP started on vanc and aztreonam, being admitted for further care (17 Feb 2018 21:15)      REVIEW OF SYSTEMS:    CONSTITUTIONAL: No fever, weight loss, or fatigue  EYES: No eye pain, visual disturbances, or discharge  ENMT:  No difficulty hearing, tinnitus, vertigo; No sinus or throat pain  NECK: No pain or stiffness  BREASTS: No pain, masses, or nipple discharge  RESPIRATORY: No cough, wheezing, chills or hemoptysis; No shortness of breath  CARDIOVASCULAR: No chest pain, palpitations, dizziness, or leg swelling  GASTROINTESTINAL: No abdominal or epigastric pain. No nausea, vomiting, or hematemesis; No diarrhea or constipation. No melena or hematochezia.  GENITOURINARY: No dysuria, frequency, hematuria, or incontinence  NEUROLOGICAL: No headaches, memory loss, loss of strength, numbness, or tremors  SKIN: No itching, burning, rashes, or lesions   LYMPH NODES: No enlarged glands  ENDOCRINE: No heat or cold intolerance; No hair loss  MUSCULOSKELETAL: No joint pain or swelling; No muscle, back, or extremity pain  PSYCHIATRIC: No depression, anxiety, mood swings, or difficulty sleeping  HEME/LYMPH: No easy bruising, or bleeding gums  ALLERGY AND IMMUNOLOGIC: No hives or eczema    MEDICATIONS  (STANDING):  ALBUTerol    0.083% 2.5 milliGRAM(s) Nebulizer every 6 hours  buDESOnide 160 MICROgram(s)/formoterol 4.5 MICROgram(s) Inhaler 2 Puff(s) Inhalation two times a day  levothyroxine Injectable 50 MICROGram(s) IV Push <User Schedule>  methylPREDNISolone sodium succinate Injectable 40 milliGRAM(s) IV Push daily  pantoprazole  Injectable 40 milliGRAM(s) IV Push daily    MEDICATIONS  (PRN):  acetaminophen  Suppository 650 milliGRAM(s) Rectal every 6 hours PRN For Temp greater than 38 C (100.4 F)  acetaminophen  Suppository. 650 milliGRAM(s) Rectal every 6 hours PRN Moderate Pain (4 - 6)  hydrALAZINE Injectable 10 milliGRAM(s) IV Push every 4 hours PRN htn  metoprolol    tartrate Injectable 5 milliGRAM(s) IV Push every 6 hours PRN htn      ALLERGIES: IV Contrast (Unknown)  penicillin (Unknown)      FAMILY HISTORY:      Social history:  Alochol:   Smoking:   Drug Use:   Marital Status:     PHYSICAL EXAMINATION:  -----------------------------  T(C): 37 (02-22-18 @ 05:38), Max: 37 (02-22-18 @ 05:38)  HR: 74 (02-22-18 @ 07:45) (66 - 90)  BP: 174/73 (02-22-18 @ 05:38) (150/61 - 192/76)  RR: 17 (02-22-18 @ 05:38) (17 - 20)  SpO2: 95% (02-22-18 @ 07:45) (92% - 97%)  Wt(kg): --        Constitutional: well developed, normal appearance, well groomed, well nourished, no deformities and no acute distress.   Eyes: the conjunctiva exhibited no abnormalities and the eyelids demonstrated no xanthelasmas.   HEENT: normal oral mucosa, no oral pallor and no oral cyanosis.   Neck: normal jugular venous A waves present, normal jugular venous V waves present and no jugular venous leong A waves.   Pulmonary: no respiratory distress, normal respiratory rhythm and effort, no accessory muscle use and lungs were clear to auscultation bilaterally. Anteriorly  Cardiovascular: heart rate and rhythm were normal, normal S1 and S2 with II/VI systolic murmur.  Musculoskeletal: the gait could not be assessed.   Extremities: no clubbing of the fingernails, no localized cyanosis, no petechial hemorrhages and no ischemic changes.   Skin: normal skin color and pigmentation, no rash, no venous stasis, no skin lesions, no skin ulcer and no xanthoma was observed.     LABS:   --------  02-22    147<H>  |  110<H>  |  32<H>  ----------------------------<  69<L>  3.7   |  26  |  1.05    Ca    8.6      22 Feb 2018 07:01                           10.5   17.0  )-----------( 180      ( 22 Feb 2018 07:01 )             32.7                   Radiology:

## 2018-02-22 NOTE — PROGRESS NOTE ADULT - PROBLEM SELECTOR PLAN 3
cont cardiac meds avoid fluid overload, cardio consult
Elevated just today.  To continue to monitor.
Symbicort  Steroids
comfort care
cont cardiac meds avoid fluid overload, cardio consult
cont cardiac meds, cardiac monitoring, observe for change in vital signs, watch for fluid overload
cont cardiac meds, cardiac monitoring, observe for change in vital signs, watch for fluid overload

## 2018-02-22 NOTE — PROGRESS NOTE ADULT - ASSESSMENT
· Assessment		  89y/o w/f seen at [facility] telemetry.  History HTN, CLL, AS, COPD, dementia, thyroid disease, GERD, s/p sub-arachnoid surgery, s/p GI bleed.    2/17/18 admitted for lethargy, diarrhea, decreased appetite.    2/22/18  Patient sleeping comfortably, lying flat.  Easily arousable.  Patient appears more lethargic.  No cardiac complaints offered.  "I feel fine."    Plan:  - Continue metoprolol.  - Encourage nutrition.  - Increase ambulation with assistance if possible.  - Comfort care.  - Discharge planning.

## 2018-02-22 NOTE — PROGRESS NOTE ADULT - PROBLEM SELECTOR PLAN 8
iv hydration,stool exam, culture
pantoprazole
comfort care
iv hydration,stool exam, culture
pantoprazole

## 2018-02-22 NOTE — DISCHARGE NOTE ADULT - HOSPITAL COURSE
89 yr old with PMH of Dementia,depression,severe aortic incompetence ,, CVA- haemorruagic stroke in 2005,severe GI bleed 4 yrs back,, gait disbility following CVA, parkinson hypothyroid, lactose intolerant,allergic to penicillin and contrast dye,,   CHF systolic  diastolic with copd  asymptomatic bacteruria, with h/o ESBL e coli colonization, with  fraility , CKD 3., GERD.   patient had thoracentesis on 11/20, and it is transudative    brought back for woesening lethargy, poor po intake diarrhea,altered mental state likely encephalopathy with advanced dementia dehydration chavo ckd  IN ED suspected HCAP started on vanc and aztreonam, being admitted for further care   from: US Transthoracic Echocardiogram w/Doppler Complete (10.02.17 @ 09:05) >   Left ventricular hypertrophy with normal EF  2. Biatrial enlargement   3. aortic sclerosis moderate to  severe AI   4. mild Tricuspid regurgitation with RVSP 31 mm hg .  5. Moderate mitral regurgitation.  patient has AMS with metabolic encephalopathy with dehydration CHAVO with ckd3 with dehydration with poor PO intake with advanced dementia with behavioural disorder with valvular heart disease with diastolic heart failure with CLL, anemia of ch disease with diarrhea possible impaction, hypothermia, h/o CVA, esbl colonization, possible PNa, r/o sepsis   ID consult noted  Leukocytosis reactive resolved  Doubt pnumonia  or UTI   Hx of colonization with ESBL E coli   pt has AMS with advanced dementia,with dysphagia, with metabolic encephalopathy with chavo CKD 3, with dehdration with poor po intake, valvular heart disease , cll, anemia  of ch disease, uti sepsis ruled out, palliative care d/w son. steroids tapered, pulmonary edema when receives Iv,acute respiratory failure with hypoxia, with COPd possible PNa possible recurrent aspiration, ,cad , Multiorgan failure. as patient not eating and not swallowing, on IV meds PRN. neuro consult noted advanced dementia with dysphagia, with multiorgan failure with failure to thrive,sepsis ruled out, , comfort care suggested as family do not want tube feeds  Family meeting on 2/21/18 to discuss palliative care.  Family agreed for comfort care, and started on comfort measures and referred to hospice,, discharged to hospice care

## 2018-02-22 NOTE — DISCHARGE NOTE ADULT - CARE PLAN
Principal Discharge DX:	Acute respiratory failure with hypoxia and hypercapnia  Goal:	comfort care  Assessment and plan of treatment:	comfort care  Secondary Diagnosis:	CLL (chronic lymphocytic leukemia)  Assessment and plan of treatment:	hospice care  Secondary Diagnosis:	Chronic obstructive pulmonary disease, unspecified COPD type  Assessment and plan of treatment:	hospice care  Secondary Diagnosis:	Dementia with behavioral disturbance, unspecified dementia type  Assessment and plan of treatment:	hospice care  Secondary Diagnosis:	Oropharyngeal dysphagia  Assessment and plan of treatment:	comfort care

## 2018-02-22 NOTE — PROGRESS NOTE ADULT - PROBLEM SELECTOR PLAN 9
continue cardiac meds, avoid fluid overload
continue cardiac meds, avoid fluid overload
comfort care
continue cardiac meds, avoid fluid overload
continue cardiac meds, avoid fluid overload

## 2018-02-22 NOTE — PROGRESS NOTE ADULT - PROBLEM SELECTOR PROBLEM 4
HTN (hypertension), benign
Coronary artery disease involving native coronary artery of native heart without angina pectoris
Dementia with behavioral disturbance, unspecified dementia type
HTN (hypertension), benign
HTN (hypertension), benign
Dementia with behavioral disturbance, unspecified dementia type

## 2018-02-22 NOTE — PROGRESS NOTE ADULT - PROBLEM SELECTOR PROBLEM 5
Dementia with behavioral disturbance, unspecified dementia type
Hypothyroidism, unspecified type
CLL (chronic lymphocytic leukemia)
Dementia with behavioral disturbance, unspecified dementia type
Dementia with behavioral disturbance, unspecified dementia type
CLL (chronic lymphocytic leukemia)

## 2018-02-22 NOTE — PROGRESS NOTE ADULT - SUBJECTIVE AND OBJECTIVE BOX
Neurology follow up note    LATOSHA [id number]      Interval History:    Patient resting in bed     MEDICATIONS    acetaminophen  Suppository 650 milliGRAM(s) Rectal every 6 hours PRN  acetaminophen  Suppository. 650 milliGRAM(s) Rectal every 6 hours PRN  ALBUTerol    0.083% 2.5 milliGRAM(s) Nebulizer every 6 hours  buDESOnide 160 MICROgram(s)/formoterol 4.5 MICROgram(s) Inhaler 2 Puff(s) Inhalation two times a day  hydrALAZINE Injectable 10 milliGRAM(s) IV Push every 4 hours PRN  levothyroxine Injectable 50 MICROGram(s) IV Push <User Schedule>  methylPREDNISolone sodium succinate Injectable 40 milliGRAM(s) IV Push daily  metoprolol    tartrate Injectable 5 milliGRAM(s) IV Push every 6 hours PRN  pantoprazole  Injectable 40 milliGRAM(s) IV Push daily      Allergies    IV Contrast (Unknown)  penicillin (Unknown)    Intolerances            Vital Signs Last 24 Hrs  T(C): 37 (22 Feb 2018 05:38), Max: 37 (22 Feb 2018 05:38)  T(F): 98.6 (22 Feb 2018 05:38), Max: 98.6 (22 Feb 2018 05:38)  HR: 74 (22 Feb 2018 07:45) (66 - 90)  BP: 174/73 (22 Feb 2018 05:38) (150/61 - 192/76)  BP(mean): --  RR: 17 (22 Feb 2018 05:38) (17 - 20)  SpO2: 95% (22 Feb 2018 07:45) (92% - 97%)      REVIEW OF SYSTEMS: Non Verbal  Limit or unable to obtain secondary to patient's poor mental status.      On Neurological Examination: The patient is awake and alert.     Extraocular movements were intact.      Pupils were equal, round, and reactive bilaterally, 3 mm to 2.     Speech was fluent.     Smile symmetric.  Motor, bilateral upper appeared to be 3+/5, right lower no significant movement, left lower subtle flexion of the hip and knee.      Sensory, bilateral upper and lower appeared intact to light touch.    Follow simple commands    GENERAL Exam: Nontoxic , No Acute Distress   	  HEENT:  normocephalic, atraumatic  		  LUNGS: Clear bilaterally      HEART: Normal S1S2   No murmur RRR        	  GI/ ABDOMEN:  Soft  Non tender    EXTREMITIES:   No Edema  No Clubbing  No Cyanosis No Edema    MUSCULOSKELETAL: decreased Range of Motion all 4 extremities   	   SKIN: Normal  No Ecchymosis               LABS:  CBC Full  -  ( 22 Feb 2018 07:01 )  WBC Count : 17.0 K/uL  Hemoglobin : 10.5 g/dL  Hematocrit : 32.7 %  Platelet Count - Automated : 180 K/uL  Mean Cell Volume : 88.7 fl  Mean Cell Hemoglobin : 28.4 pg  Mean Cell Hemoglobin Concentration : 32.0 gm/dL  Auto Neutrophil # : x  Auto Lymphocyte # : x  Auto Monocyte # : x  Auto Eosinophil # : x  Auto Basophil # : x  Auto Neutrophil % : x  Auto Lymphocyte % : x  Auto Monocyte % : x  Auto Eosinophil % : x  Auto Basophil % : x      02-22    147<H>  |  110<H>  |  32<H>  ----------------------------<  69<L>  3.7   |  26  |  1.05    Ca    8.6      22 Feb 2018 07:01      Hemoglobin A1C:       Vitamin B12         RADIOLOGY    ANALYSIS AND PLAN:  A 89-year-old with episode of changes in mental status, history of subarachnoid hemorrhage, dementia, and hypothyroidism.  1.	For changes in mental status and lethargy, most likely secondary to metabolic encephalopathy from congestive heart failure.  2.	I would recommend to monitor the patient's oxygen saturation.  3.	For history of subarachnoid hemorrhage, there is no new sign of bleeding from CT imaging of the brain done upon initial presentation.  I would recommend supportive therapy.  4.	For dementia secondary to age, I would recommend supportive therapy.  5.	For hypothyroidism, continue Synthroid as needed.  6.	Aspiration precautions.  7.	Spoke with sonHiren.  Telephone numbers are [phone], alternate number is [phone].  He understands the reasoning and thought process. 2/22/18  8.	Spoke to son for hospice   Thank you for the courtesy of consultation.    Greater than 45 minutes spent in direct patient care reviewing  the notes, lab data/ imaging , discussion with multidisciplinary team.

## 2018-02-22 NOTE — PROGRESS NOTE ADULT - PROBLEM SELECTOR PROBLEM 9
Valvular heart disease

## 2018-02-22 NOTE — DISCHARGE NOTE ADULT - PATIENT PORTAL LINK FT
You can access the [facility] Patient Portal, offered by [facility], by registering with the following website: [email or web address]

## 2018-02-22 NOTE — PROGRESS NOTE ADULT - PROBLEM SELECTOR PLAN 4
cont cardiac meds, cardiac monitoring, observe for change in vital signs, watch for fluid overload
cont cardiac meds, cardiac monitoring, observe for change in vital signs, watch for fluid overload
morphine prn
supportive care
supportive care

## 2018-02-22 NOTE — PROGRESS NOTE ADULT - PROBLEM SELECTOR PROBLEM 1
Acute respiratory failure with hypoxia
PNA (pneumonia)
PNA (pneumonia)
R/O Acute respiratory failure
PNA (pneumonia)

## 2018-04-25 NOTE — ED ADULT NURSE NOTE - NSSISCREENINGQ5_ED_A_ED
Prozac 20 mg po qd with goal dose of 60 mg  Risperidone 1 mg po qhs  Inc metoprolol to 100 mg po qd
No

## 2018-10-07 NOTE — DIETITIAN INITIAL EVALUATION ADULT. - NS AS NUTRI DX FUNTIONAL
Pt states pain has decreased some. ERP in to discuss plan of care. Patient provided printed discharge instructions which included signs and symptoms to look out for, why to return to ER, and other follow up appointment to make with [facility]. Patient stated they understand discharge instructions and had no further questions or concerns at this time. Patient discharged to home with family. Patient ambulated out of ER with stable gait.   Swallowing difficulty

## 2019-01-10 NOTE — PROGRESS NOTE ADULT - PROBLEM SELECTOR PROBLEM 2
Dementia with behavioral disturbance, unspecified dementia type
Chronic obstructive pulmonary disease, unspecified COPD type
Dementia with behavioral disturbance, unspecified dementia type
Gastroesophageal reflux disease without esophagitis
Patient requests all Lab and Radiology Results on their Discharge Instructions
Dementia with behavioral disturbance, unspecified dementia type
Dementia with behavioral disturbance, unspecified dementia type

## 2019-03-28 NOTE — DIETITIAN INITIAL EVALUATION ADULT. - FEEDING SKILL
Nutrition Assessment    Type and Reason for Visit: Reassess    Nutrition Recommendations:   1. High nutrition concern with limited access to nutrition, high protein needs, poor tolerance to nutrition supplements, and extended hospital stay. 2.  May need to consider alternate nutrition methods to prevent worsening nutritional status  3. Will monitor nutritional adequacy, nutrition-related labs, weights, BMs, and clinical progress     Nutrition Assessment: Follow up:  Nutritional status at risk for decline with increased nutrient needs and limited access to nutrition. NPO for chest x ray today showing bilateral effusions larger on the right; patient to have drain placed to treat. Will continue to monitor. Malnutrition Assessment:  · Malnutrition Status: Meets the criteria for moderate malnutrition  · Context: Acute illness or injury  · Findings of the 6 clinical characteristics of malnutrition (Minimum of 2 out of 6 clinical characteristics is required to make the diagnosis of moderate or severe Protein Calorie Malnutrition based on AND/ASPEN Guidelines):  1. Energy Intake-Less than or equal to 75% of estimated energy requirement, Greater than or equal to 7 days    2. Weight Loss-No significant weight loss,    3. Fat Loss-Mild subcutaneous fat loss, Orbital  4. Muscle Loss-Moderate muscle mass loss, Temples (temporalis muscle), Clavicles (pectoralis and deltoids)  5. Fluid Accumulation-Unable to assess,    6.   Strength-Not measured    Nutrition Risk Level: High    Nutrient Needs:  · Estimated Daily Total Kcal: 0389-8681(28-30 kcal/kg)  · Estimated Daily Protein (g): 61-77  · Estimated Daily Total Fluid (ml/day): 1 ml/kcal or per MD    Nutrition Diagnosis:   · Problem: Increased nutrient needs(protein)  · Etiology: related to Increased demand for energy/nutrients     Signs and symptoms:  as evidenced by Presence of wounds    Objective Information:  · Nutrition-Focused Physical Findings: fever overnight  · Wound Type: Multiple, Pressure Ulcer, Stage II, Stage III(Sacrum, R heel, Buttock)  · Current Nutrition Therapies:  · Oral Diet Orders: Low Fiber   · Oral Diet intake: Unable to assess, 1-25%(no intakes per EMR, pt reports bites of food)  · Oral Nutrition Supplement (ONS) Orders: None  · ONS intake: 0%, Refused  · Anthropometric Measures:  · Ht: 4' 10\" (147.3 cm)   · Current Body Wt: 113 lb (51.3 kg)  · % Weight Change:  ,  No weight change per EMR  · Ideal Body Wt: 96 lb (43.5 kg), % Ideal Body    · BMI Classification: BMI 18.5 - 24.9 Normal Weight    Nutrition Interventions:   (may need alternate nutrition in the next 1-2 days)  Continued Inpatient Monitoring    Nutrition Evaluation:   · Evaluation: No progress toward goals   · Goals: Pt will advance to PO diet with intakes of 50% or greater and no s/s of N/V    · Monitoring: Nutrition Progression, Meal Intake, Pertinent Labs      Electronically signed by Barrie White RD, LD on 3/28/19 at 3:58 PM    Contact Number: Office: [phone]; [address]: [phone] age appropriate assistance

## 2019-05-22 NOTE — ED PROVIDER NOTE - OBJECTIVE STATEMENT
LMP
89 yo from [address] with SOB today. Pt with dementia cannot give hx. Denies pain, cough or SOB at present.

## 2019-08-09 NOTE — CONSULT NOTE ADULT - CONSULT REQUESTED BY NAME
Dr. Barton V-Y Flap Text: The defect edges were debeveled with a #15 scalpel blade.  Given the location of the defect, shape of the defect and the proximity to free margins a V-Y flap was deemed most appropriate.  Using a sterile surgical marker, an appropriate advancement flap was drawn incorporating the defect and placing the expected incisions within the relaxed skin tension lines where possible.    The area thus outlined was incised deep to adipose tissue with a #15 scalpel blade.  The skin margins were undermined to an appropriate distance in all directions utilizing iris scissors.

## 2019-10-17 NOTE — PATIENT PROFILE ADULT. - FUNCTIONAL SCREEN CURRENT LEVEL: BATHING, MLM
Patient needs a refill on her Omeprazole sent in to [facility] waited to long to order from [facility]    (4) completely dependent

## 2020-08-06 NOTE — PROGRESS NOTE ADULT - NEUROLOGICAL SYMPTOMS
difficulty walking
confusion
difficulty walking
difficulty walking/confusion
difficulty walking/confusion
confusion
confusion
1

## 2020-11-08 NOTE — PROGRESS NOTE ADULT - CARDIOVASCULAR
details… detailed exam You can access the FollowMyHealth Patient Portal offered by [facility] by registering at the following website: [email or web address]. By joining [facility]’s FollowMyHealth portal, you will also be able to view your health information using other applications (apps) compatible with our system.

## 2020-11-30 NOTE — DIETITIAN INITIAL EVALUATION ADULT. - PROBLEM SELECTOR PROBLEM 7
[At Term] : at term [Normal Vaginal Route] : by normal vaginal route [None] : there were no delivery complications [de-identified] : forceps assisted [FreeTextEntry1] : 8 lbs 3 oz [FreeTextEntry6] : None Leukocytosis, unspecified type

## 2021-01-06 NOTE — PROGRESS NOTE ADULT - BACK EXAM
CARDIOVASCULAR TRANSFER OF CARE/CONSULTATION    Date of admission: 1/6/2021  9:13 AM  Date of consultation: 01/06/21    Primary Cardiologist: Dr. Richmond     REASON FOR CONSULTATION: Chest pain / SOB     Referring provider: Lizett Ceja MD    HISTORY OF PRESENT ILLNESS    Carol Nova is 81 year old female with a history of chronic HFpEF s/p CardioMEMs 8/12/2020, nonobstructive CAD by cath 2016 , NICMP, SSS s/p PPM,  PAF, AVR by Dr. Fitzgerald who presented to [facility] early this morning with c/o chest pian that woke her from sleep. Per chart review, her chest pain started in the epigastric area and radiated to her upper chest, pt notes SOB associated with chest pain. Of note, she was c/o weakness, fatigue, loss of apeitie with weight loss and nausea which has now worsened. She called EMS and given 324mg ASA and 1 nitro by EMS which did not help to relieve her sx.     In ED: BP low 90s-100s, HR 60s-70s, afebrile, Na 128, K 2.9, creat 1.38, pBNP 29K, troponin negative, WBC 13.3, d. Dimer 0.82, CXR showing right pleural effusion and small left pleural effusion. She was given a 500 cc bolus in ED, K 40 mEq x1. Since admit, thoracentesis has been ordered for right pleural effusion.     PAST HISTORIES      Past Medical History:   Diagnosis Date   • Alcoholism /alcohol abuse (CMS/HCC)    • Anxiety    • Aortic stenosis     S/P AVR   • Atrial fibrillation (CMS/HCC)    • Benign positional vertigo    • Bilateral age-related macular degeneration     gets around OK but has difficulty reading   • Bilateral carotid artery stenosis    • Colon polyps     tubular adenoma   • Congestive cardiac failure (CMS/HCC)    • Coronary artery disease    • Depression    • Diverticulosis    • Gastroesophageal reflux disease    • GI bleed 08/2017    while on Warfarin   • Hyperlipidemia    • Lung nodule    • Malignant neoplasm of breast (female), unspecified site 2009    Stage I left breast invasive ductal carcinoma, 1.1 cm, ER/OH positive, HER-2/obie 
negative, 5 lymph nodes negative for metastatic disease, s/p lumpectomy July 20, 2009 with intermediate Onco Dx score.      • Mesenteric artery insufficiency (CMS/HCC)    • Mitral regurgitation     mild-moderate   • Murmur, heart    • Neuropathy of both feet     Uses Tylenol   • Non-ischemic cardiomyopathy (CMS/HCC) 2020    EF 54%   • Pacemaker 2012    Medtronic   • Pleural effusion on right 05/2020    chest tube   • Pneumonia    • SSS (sick sinus syndrome) (CMS/HCC)    • SUPRAVENTRICULAR TACHYCARDIA    • Thyroid nodule    • Type II or unspecified type diabetes mellitus without mention of complication, not stated as uncontrolled    • Unspecified essential hypertension    • Wears dentures     full upper   • Wears glasses    • Wears partial dentures     upper denture       Past Surgical History:   Procedure Laterality Date   • Artery surgery  08/22/2012    mesenteric artery ischemia stent placed   • Breast surgery Left 07/20/2009    lumpectomy   • Cardiac catherization  12/09/2016    50% p LAD   • Cardiac surgery  08/08/2014    AVR  tissue   • Colonoscopy w/ polypectomy  03/2007    polyps (tubular adenoma), diverticulosis   • Colonoscopy w/ polypectomy  08/15/2012    polyps (tubular adenoma), diverticulosis   • Colonoscopy w/ polypectomy  05/16/2017    polyps (tubular adenoma), diverticulosis   • Ct guided chest tube insertion  05/2020    Right pleural effusion   • Ct salivary glands combo  01/01/1989    Left submandibular gland excision   • Eye surgery Left 10/08/2012    vitrectomy with membrane peel   • Eye surgery Right 11/27/2012    vitrectomy with membrane peel   • Eye surgery Left 07/20/2011    cataract extraction with IOL implant   • Eye surgery Right 10/12/2011    cataract extraction with IOL implant   • Hysterectomy  01/01/1980    one ovary left   • Pa pressure sensor implant - cv  08/12/2020   • Pacemaker implant  09/17/2012    Medtronic for SSS   • Tonsillectomy      with adnoids as a child   • Us guide 
thyroid biopsy         ALLERGIES:   Allergen Reactions   • Ace Inhibitors Other (See Comments)     cough   • Ezetimibe-Simvastatin NAUSEA   • Lisinopril HEADACHES, DIARRHEA and PRURITUS   • Statins GI UPSET       Social History     Tobacco Use   • Smoking status: Never Smoker   • Smokeless tobacco: Never Used   Substance Use Topics   • Alcohol use: Not Currently     Alcohol/week: 2.0 - 3.0 standard drinks     Types: 2 - 3 Glasses of wine per week     Frequency: Monthly or less     Drinks per session: 1 or 2     Binge frequency: Never     Comment: alcohol abuse prior to May 2012   • Drug use: No        Family History   Problem Relation Age of Onset   • Neurological Disorder Mother         Alzheimer's   • Cancer Father         Lung cancer   • Cancer Sister         Melanoma   • Cancer Sister         Mesothelioma   • Heart Brother         AMI - half brother       CURRENT MEDICATIONS:  Scheduled:   • aspirin  81 mg Oral Daily   • vitamin - therapeutic multivitamins w/minerals  1 tablet Oral Daily   • sildenafil  20 mg Oral TID   • potassium chloride  40 mEq Oral BID WC   • pantoprazole  40 mg Oral Daily   • vitamin - therapeutic multivitamins w/minerals  1 tablet Oral Daily   • metoPROLOL tartrate  50 mg Oral BID   • escitalopram  10 mg Oral Daily   • dilTIAZem  240 mg Oral Daily   • sodium chloride (PF)  2 mL Intracatheter 2 times per day   • enoxaparin  30 mg Subcutaneous Daily   • insulin lispro   Subcutaneous TID AC   • Potassium Standard Replacement Protocol   Does not apply See Admin Instructions   • Magnesium Standard Replacement Protocol   Does not apply See Admin Instructions   • Phosphorus Standard Replacement Protocol   Does not apply See Admin Instructions       Continuous Infusions:   • sodium chloride 0.9% infusion         REVIEW OF SYSTEMS      Constitutional: No fever or chills, + wt loss.   Cardiovascular: Baseline physical activities NYHA class III. No chest pain, lower extremity edema, palpitations or 
kyphosis
syncope/presyncope. No claudication or rest foot pain.  Respiratory: + shortness of breath, + cough no sputum or hemoptysis.  Skin: No rash or color change. No itching, petechia or bruising.  Eyes: No visual disturbance or diplopia  Ears/Nose/Throat: No nose bleeds, dysphagia or slurred speech.  Gastrointestinal: No abdominal pain, +nausea no vomiting. No melena, hematochezia or hematemesis.  Genitourinary: No hematuria, no incontinence.  Musculoskeletal: No foot ulcer or gangrene.  Neurologic: No loss of consciousness, no weakness or numbness.  Psychiatric: Denies depression, anxiety. No confusion/agitation.  Endocrine: No polyuria, polydipsia, heat or cold intolerance.    PHYSICAL EXAMINATION     Vital Last Value 24 Hour Range   Temperature 98.1 °F (36.7 °C) (01/06/21 1311) Temp  Min: 97.7 °F (36.5 °C)  Max: 98.1 °F (36.7 °C)   Pulse 63 (01/06/21 1334) Pulse  Min: 61  Max: 79   Respiratory 18 (01/06/21 1334) Resp  Min: 16  Max: 20   Non-invasive  blood pressure 100/55 (01/06/21 1300) BP  Min: 91/56  Max: 108/68   Arterial  blood pressure   No data recorded   Pulse oximetry 92 % (01/06/21 1334) SpO2  Min: 89 %  Max: 95 %     Vital Today Admission   Weight 51.8 kg (01/06/21 0930) Weight: 51.8 kg (01/06/21 0930)     Weight over the past 48 Hours:  Patient Vitals for the past 48 hrs:   Weight   01/06/21 0930 51.8 kg        Hemodynamics:   Last Value 24 Hour Range   CVP   No data recorded   PAS/PAD   No data recorded   PCWP   No data recorded   CO   No data recorded   CI   No data recorded   SV02   No data recorded     Intake/Output:  No intake or output data in the 24 hours ending 01/06/21 1359    TELEMETRY (personal interpretation): V paced this AM    CONSTITUTIONAL: Appears frail.   HEENT: Normocephalic, conjunctivae not pale, no scleral icterus. Moist mucous membranes. Pupils symmetrical.  NECK: No jugular venous distention, trachea midline. No carotid bruits bilaterally.  RESP: Symmetrical lung expansions.Lungs 
diminished.   CARDIOVASCULAR: S1 and S2 normal intensity, regular rate and rhythm. + murmurs. .No S3, S4. No edema in lower extremities or sacral region.  Pulses symmetrical 2+ in radial; Good capillary refill bilateral fingers and toes. No Foot/leg ulcer   ABDOMEN: Soft, nontender. Normoactive bowel sounds. Aorta not palpable.  EXTREMITIES: No limb atrophy. Warm, well perfused all 4 extremities.  NEUROLOGIC: Alert and oriented x3.Moves all 4 extremities equally.  SKIN: Not diaphoretic, warm. No bruising, no new rash.    RESULTS REVIEWED     Recent Labs   Lab 01/06/21  0926 01/06/21  0920   WBC  --  13.3*   HCT  --  33.4*   HGB  --  10.9*   PLT  --  251   SODIUM  --  128*   POTASSIUM  --  2.9*   CHLORIDE  --  86*   CO2  --  29   GLUCOSE  --  230*   BUN  --  126*   CREATININE 1.60* 1.38*       ECG       Imaging:  CT Chest 1/7/2021  1.  Diffuse cardiogenic pulmonary edema. Severe four-chamber cardiac  enlargement.  2.  Large loculated recurrent right and small left pleural effusions with  underlying significant atelectasis in both lower lobes, right greater than  left. Underlying pneumonic process can't be excluded.   3.  Persistent reactive mediastinal lymphadenopathy.  4.  Round dense object in the distal esophagus, likely represent a retained  pill. This may be a sign of underlying esophageal dysmotility.  5.  Anasarca.    NM Perfusion 1/6/2020  Indeterminate exam given a couple of peripheral wedge-shaped defects, one  of which correlates with an enlarging pleural effusion/opacity. Underlying  PE is not excluded. Recommend CT angiogram of the chest or correlation with  ventilatory imaging if pending COVID test is negative.     As ventilation images were not performed, a probability for pulmonary  embolus cannot be assigned.      Findings were communicated by phone to Dr. MODESTO SOTO on 1/6/2021 12:01  PM by Don Mcmillan MD.    CXR 16/2021  1. Moderate amounts of mixed interstitial and air space density 
are  demonstrated in the right base. Mild interstitial densities with Kerley B  lines are evident on the left. A moderate size right-sided pleural effusion  and a small left-sided pleural effusion are present. Differential diagnosis  includes most likely sequela of congestive heart failure although  asymmetric. The pulmonary vessels are normally distributed at this time.  2. Mild cardiomegaly.    CardioMEMs reading       Staged intervention for cardioMEMs 8/12/2020  Intervention:    Status post CardioMEMS placement  Sheath 12 Fr     Plan:   1. Access site management per protocol  2. Aspirin and plavix for 1 month    ECHO 5/15/2020  Status post aortic valve replacement (21 mm Solomon Magna bioprosthesis 2014)  Left ventricular ejection fraction, 54 %.  Abnormal (paradoxical) septal motion consistent with postoperative status.  Normal right ventricular systolic function.  Right ventricular systolic pressure 45 mmHg.  Severely increased left atrial volume 67.3 ml/m².  Mild-to-moderate mitral valve regurgitation.  Bioprosthetic aortic valve well seated.  Aortic valve mean gradient is 18.5 mmHg.  Doppler velocity index by VTI is 0.45.  Moderate tricuspid valve regurgitation.  No pericardial effusion.  No significant change since the prior study of April 8, 2018.    ASSESSMENT AND PLAN   DUMONT likely 2/2 pleural effusions and b/l lung infiltrates  - Mod right and small left pleural effusion. Unable to perform thoracentesis d/t ASA therapy. Recommend R chest tube placement both diagnostic and therapeutic.   - Unable to complete VQ scan. CTA contraindicated with BUFFY. Further workup up per primary --> CT Chest as above.     Chest pain 2/2 fluid overload  - Troponin negative. ECG showing paced rhythm. Chest pain atypical.  - No further ishcemic evaluation needed.     Chronic HFpEF   - pBNP 29 K. CXR showing b/l pleural effusions with R>L  -  C/o orthopnea and DUMONT. PTA on Torsemide 60/40 daily and Metolazone 2.5 mg qOday.   - 
Restart Torsemide 40 mg daily and likely will need to up-titrate. PA sensor check 31 mmHg. Slightly above goal   - Cardiac diet / I & Os / daily wts    Severe pulmonary hypertension  - Recently just started Revatio 20 mg TID  - Diuretic management as above     Valvular heart disease s/p bioprosthetic AVR  - Last ECHO showing moderate TR, mild-mod MR. Repeat ECHO appears valvular heart disease has worsened. Will await final report.     Hypokalemia   - K 2.9 on admit. Pt stopped taking K supplements d/t difficulty swallowing them  - Now resolved.     Moderate coronary artery disease by cath 2016  - Continue BB/ASA (statin intolerant)     Paroxysmal A. Fib / Sick Sinus Syndrome s/p PPM   - No longer on OAC d/t bleeding risk.   - Continue ASA, Cardizem 240 daily, Lopressor 50 mg BID    Valvular heart disease s/p bioprosthetic AVR, mod TR, mild-mod MR   - Stable per last ECHO 5/2020     Hypertension  - Stable but on the lower side. Recommending continuing BB and CCB.     Discussed plan with Dr. Richmond     Findings and recommendations were discussed in detail with patient who expressed agreement. Further recommendations will be made according to results of pending investigations and clinical response.    Thank you for involving us in the care of this interesting patient, we will follow closely with you.     JL Gay  _______________________________________________  I have reviewed the whole case in detail, interviewed and personally examined the patient. I edited the note to reflect my findings. The diagnostic and treatment plan documented above was formulated under my supervision. I have explained and discussed in detail with the patient who has expressed her agreement with this plan.    Currently chest pain free, SOB with minimal activity, no palpitations, lightheadedness  S1S2 RRR, 3/6 holosystolic murmur apex and sternal border. Pos JVD, no LE edema, lungs decreased BS bases.     Rocky Richmond MD    
kyphosis
kyphosis/scoliosis
kyphosis

## 2021-06-01 NOTE — DISCHARGE NOTE ADULT - BECAUSE OF A PHYSICAL, MENTAL OR EMOTIONAL CONDITION, DO YOU HAVE DIFFICULTY DOING  ERRANDS ALONE LIKE VISITING A DOCTOR'S OFFICE OR SHOPPING (15 YEARS AND OLDER)
Achilles Tendonitis     *Chronic condition   *Characterized by pain and swelling in the Achilles tendon.     Symptoms are often due to swelling and inflammation of the tissue (synovial lining) that surrounds the Achilles tendon. Therefore the condition can be described as an Achilles Tenosynovitis (Inflammation of the lining surrounding the Achilles tendon).     Associated with:  *Increase in activity level   *Irritation to the synovial sheath = direct pressure from shoe wear or   *Irregularity in the Achilles tendon (ex. a nodule from Achilles tendonosis).   *Despite pain, the tendon itself is usually intact   *Not believed to increase the risk of suffering an Achilles Tendon ruputure    Physical Examination:  *Swelling   *Tenderness around the Achilles tendon.   *Collins Test = The calf muscle is then squeezed and the foot should move if the Achilles is intact.    Imaging Studies  *X-rays will usually be negative   *MRI can give a detailed view of the soft tissue, but is usually not indicated for an initial assessment of Achilles tendonitis    Treatment  *Involves a period to let the symptoms settle followed by a gradual return to normal activities. The elements of non-operative treatment may include:  Heel Lift: Takes tension off Achilles Tendon    Activity Modification    *Stop any precipitating activities     *New training programs    *New shoes    *Any new activity that loads the Achilles     *Aggravating shoes should be modified or discarded     Shoewear Modification    Well fitted “comfort” shoes    Anti-Inflammatory Medication (NSAIDs)  Anti-inflammatory medications taken 1-3 times per day can help to settle symptoms in the short-term and break the cycle of pain and discomfort.   Trolamine Salicylate commonly known as Aspercreme is a topical aspirin pain reliever. It works by reducing swelling and inflammation in soft tissue. It is applied to the skin over the area where you are having pain or swelling.  Please apply 3 to 4 times daily to this area as stated in the directions on the tube. Please let us know if you have any problems with aspirin containing products.     Calf and Achilles Stretching  A consistent calf stretching program is an important component of treatment.     Double leg heel rises  A high repetition, low resistence calf strengthening program can be very helpful in stretching and strengthening the Achilles tendon and calf muscle. Double leg heel rises done in a controled manner and performed while standing on the edge of stairs (to allow for more excursion theorugh the ankle) can be very helpful. Consider starting with 5 sets of 5 repetitions and slowly over a period of weeks advance to 10-15 sets of 15 repetitions.     Eccentric Achilles Tendon Exercises (ex. Heel Drops)  Controlled exercises whereby the Achilles tendon is being lengthened while the calf muscle contracts have been shown to be very helpful in improving the symptoms associated with Achilles tendonitis. An example of this type of exercise is a “Heel drop” where the patient stands on their toes while positioned on the edge of a ledge such as a stair. They then slowly lower   their heels down below the ledge simultaneously stretching and strengthening the Achilles tendon. This can be done with both legs at a time (bilaterally) or for a more concentrated effort -one leg at a time. It can also be done with the knees straight (loading the gastrocnemius) or the knees bent (loading the soleus).      Patients should gradually work up to performing 5 sets of 10 repetitions. These  exercises should be performed 5-6 days per week during the active treatment  phase and then 3 times per week to minimize the chance of developing recurrent  symptoms.    Information taken from and can be found on [email or web address].com    Yes

## 2021-06-12 NOTE — DIETITIAN INITIAL EVALUATION ADULT. - PROBLEM SELECTOR PROBLEM 5
Patient attended group class to review pre-op instructions for upcoming surgery.  Discussed admission process and hospital course.  Pharmacy information packet given and explained. Patient was given exercises to work on post-op for maintaining muscle mass and strengthening that was created by Physical Therapy.  Bariatric quiz reviewed. Rationale for correct answers given and pt. verbalized understanding. Discharge instructions, information card and follow up appointments given and reviewed with pt at this time and patient verbalized understanding.      Pt will do his pre-op labs, EKG and CXR at his PCP clinic with  when he is there to have his pre-op H&P.      Nellie Marcano RN, [facility]  P [phone]  F [phone]     Gastroesophageal reflux disease without esophagitis

## 2021-06-29 NOTE — H&P ADULT - PROBLEM SELECTOR PROBLEM 5
Patient Information    Lab -- Please go to the lab now to have your labwork completed. Your doctor's office will notify you of your results once reviewed.    Follow Up  -- Follow up with your regular Primary Care Provider.     --one week before date of surgery, stop taking any over the counter vitamins/supplements, aspirin or anti-inflammatory medications (ibuprofen, advil, aleve, motrin).    --ok to use tylenol the week before surgery if needed.    Additional Educational Resources:  For additional resources regarding your symptoms, diagnosis, or further health information, please visit the Health Resources section on [email or web address] or the Online Health Resources section in [employer].       CLL (chronic lymphocytic leukemia)

## 2021-09-28 NOTE — ED PROVIDER NOTE - TOBACCO USE
Spoke with pharmacy to clarify that both meds are on the pt medication list and pt is to take both.    Unknown if ever smoked

## 2022-01-25 NOTE — ED ADULT TRIAGE NOTE - AS O2 DELIVERY
room air Winlevi Pregnancy And Lactation Text: This medication is considered safe during pregnancy and breastfeeding.

## 2023-06-05 NOTE — ED PROVIDER NOTE - CONDUCTED A DETAILED DISCUSSION WITH PATIENT AND/OR GUARDIAN REGARDING, MDM
radiology results/lab results Sski Pregnancy And Lactation Text: This medication is Pregnancy Category D and isn't considered safe during pregnancy. It is excreted in breast milk.

## 2023-10-17 NOTE — PATIENT PROFILE ADULT. - AS [facility] NUTRITION
Video visit with patient and mother at home.. Greater than 50% of the time spent in counseling for medication. 30 minutes total visit and documentation. Patient did not think that there was anything that they needed to work on. Reason for visit: F/u mood, ADHD, anxiety, PTSD. She is angry \"not that often because I'm taking my meds. She denies she is sad a lot or anxious a lot. Denies being overly negative or overly positive. Enjoys usual activities. Denies feeling hopeless or helpless. Denies urges to self-harm or suicidal thoughts or wishing they were dead. Patient says they are getting their schoolwork done. The patient says that they are able to sit still \"kind of\", able to keep quiet, and are distracted easily, thinks more than peers. Mornings were about the same as afternoon. They deny blurting out. She daydreams. Denies making funny noises in class. Denies leaving the house or the classroom without permission. She says she has not missed school when not sick. She thinks about bad things from the past a lot. She denies nightmares. Denies side effects from medication. Medicine helps with \"keeping my mood stabilized\". She still smokes marijuana \"I'm trying to slow it down\". She says she smokes every day still but not as much as she used to. She vapes every day, tries to quit. She does crave it. She craves marijuana sometimes. Otherwise denies substance use or abuse. Denies hitting others or objects or throwing or breaking things. She thinks she has a lot of control when angry. Breathing exercises or self-talk help. She wants to go to [facility] for cosmetology for nails and then wants to go to 's school. She takes lamotrigine every day 50 mg. She has not been taking trazodone as mother goes to sleep first and mother has it locked up. She feels wide awake. She does take melatonin two to three 5 mg which helps. She takes Focalin just on school days. She is not driving. Mother says she has been doing okay with  school. She has a lot of cavities and has had to be off a lot for teeth. She is taking better care of her teeth. One of the dogs passed away, handling is okay. Mother says they argue about her room, about her not cleaning it as well as she should but that is the only thing they really argue about. We discussed titrating lamotrigine back up as originally planned and mother and patient agreeable to this. Mental status: Alert, oriented to person, place, and date. Mood euthymic, affect euthymic. Speech normal rate and tone, logical, goal oriented. Psychomotor normal. Denies suicidal or homicidal ideation.  Denies auditory or visual hallucinations.  Does not appear internally preoccupied. Attention and concentration good. No motor or vocal tics noted. Insight and judgment mostly good-compliant with medication, making efforts for school, not arguing with mother about things other than cleaning her room. Diagnoses and all orders for this visit:    Bipolar II disorder (CMD)    Attention deficit hyperactivity disorder, combined type  -     dexmethylphenidate (Focalin XR) 20 MG 24 hr capsule; Take one at 11 am    Other mixed anxiety disorders    PTSD (post-traumatic stress disorder)    Other orders  -     lamoTRIgine (LaMICtal) 25 MG tablet; TAKE 3 TABLETS DAILY FOR 2 WEEKS  -     lamoTRIgine (LaMICtal) 100 MG tablet; Take 1 tablet by mouth daily. (after two weeks of three 25 mg)    Mostly doing pretty well in terms of mood, able to focus for school. Denies being anxious a lot. Denies nightmares but sometimes thinks of negative things from the past during the day. Plan is to increase lamotrigine to 75 mg daily for 2 weeks, then 100 mg daily. We will continue Focalin the same. Prescriptions sent to [facility] on [address] in [place]. Mother said she would call and set up a 1 month-follow-up.   (3) adequate

## 2024-02-15 NOTE — DIETITIAN INITIAL EVALUATION ADULT. - OTHER INFO
for input(s): \"BNP\" in the last 72 hours.  PT/INR: No results for input(s): \"PROTIME\", \"INR\" in the last 72 hours.  APTT: No results for input(s): \"APTT\" in the last 72 hours.  CARDIAC ENZYMES: No results for input(s): \"CKMB\", \"CKMBINDEX\", \"TROPONINT\" in the last 72 hours.    Invalid input(s): \"CKTOTAL;3\" troponins   FASTING LIPID PANEL:No results found for: \"CHOL\", \"HDL\", \"TRIG\"  LIVER PROFILE:   No results for input(s): \"AST\", \"ALT\", \"ALB\", \"BILIDIR\", \"BILITOT\", \"ALKPHOS\" in the last 72 hours.       Current Medications:   Current Facility-Administered Medications: ipratropium 0.5 mg-albuterol 2.5 mg (DUONEB) nebulizer solution 1 Dose, 1 Dose, Inhalation, TID RT  docusate sodium (ENEMEEZ) enema 283 mg, 1 enema, Rectal, Daily PRN  [Held by provider] lisinopril (PRINIVIL;ZESTRIL) tablet 5 mg, 5 mg, Oral, Daily  dextromethorphan (DELSYM) 30 MG/5ML extended release liquid 30 mg, 30 mg, Oral, Daily  topiramate (TOPAMAX) tablet 25 mg, 25 mg, Oral, BID  amitriptyline (ELAVIL) tablet 25 mg, 25 mg, Oral, Nightly  butalbital-APAP-caffeine -40 MG per capsule 1 capsule, 1 capsule, Oral, Daily PRN  polyethylene glycol (GLYCOLAX) packet 17 g, 17 g, Oral, Daily PRN  baclofen (LIORESAL) tablet 5 mg, 5 mg, Oral, BID  Benzocaine-Menthol (CEPACOL) 1 lozenge, 1 lozenge, Oral, Q2H PRN  diclofenac sodium (VOLTAREN) 1 % gel 2 g, 2 g, Topical, BID  acetaminophen (TYLENOL) tablet 650 mg, 650 mg, Oral, Q4H PRN  bisacodyl (DULCOLAX) suppository 10 mg, 10 mg, Rectal, Daily PRN  apixaban (ELIQUIS) tablet 2.5 mg, 2.5 mg, Oral, BID  citalopram (CELEXA) tablet 10 mg, 10 mg, Oral, Daily  ferrous sulfate (IRON 325) tablet 325 mg, 325 mg, Oral, Every Other Day  gabapentin (NEURONTIN) capsule 100 mg, 100 mg, Oral, TID  lacosamide (VIMPAT) tablet 100 mg, 100 mg, Oral, BID  lidocaine 4 % external patch 1 patch, 1 patch, TransDERmal, Daily PRN  aspirin chewable tablet 81 mg, 81 mg, Oral, Daily  atorvastatin (LIPITOR) tablet 80 mg, 80 mg, Oral,  Nightly  melatonin tablet 6 mg, 6 mg, Oral, Nightly  modafinil (PROVIGIL) tablet 200 mg, 200 mg, Oral, Daily  therapeutic multivitamin-minerals 1 tablet, 1 tablet, Oral, Daily  senna (SENOKOT) tablet 17.2 mg, 2 tablet, Oral, Nightly      Impression/Plan:   Impaired ADLs, gait, and mobility due to:      L nondominant 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 PT/OT  for gait, mobility, strengthening, endurance, ADLs, and self care. For cranioplasty with Dr. Corbett - has new patient appointment 2/26  Dural venous sinus thrombosis/abdominal venous thrombosis: on Eliquis - lower dose due to bleeding/anemia  Dysphagia: SLP treating. Mild oral stage  Spasticity: on baclofen - low dose BID  Headache: Neurology adjusted medications for headache. Weaned from Depakote. Increased Elavil and Topamax. Has Fioricet prn and Toradol IM prn. Neuro signed off.  Seizure: on Vimpat  Pseudobulbar affect: psychiatry treating with dextromethorphan.   Impaired focus/attention: Improved. on modafinil   Thrombocytopenia: worsened. Internal medicine monitoring platelet count and Eliquis closely.   Anemia: improved. Monitoring  L arm neurologic pain: on gabapentin and lidoderm patch  OA B knees: has Voltaren gel  Cellulitis: related to peripheral IV. Completed Keflex 1/29/24.  Severe emphysema: stable on room air  Infrarenal AAA: measures 4.5 cm. For outpatient follow up  Mild hyponatremia: Stable. Monitoring  Insomnia: on melatonin  CAD: Hx MI, CVA and CABG, stents. On ASA, atorvastatin.   HTN: on carvedilol, lisinopril - IM titrating to effective dose and discontinued lisinopril for hypotension  HLD: on atorvastatin.  Seborrheic dermatitis: OK for wife to apply baby oil topically but with no pressure over craniectomy site  Depression/anxiety: on Celexa. Psychiatry following and managing.   Bowel Management: Miralax  89F adm from [facility] c SOB, dx c dCHF, COPD, PE- s/p thoracentesis today. Pt presently on pureed diet c nectar thick flds- visited pt while eating lunch, pt can feed self c tray set up. Po intake fair, consumed 100% of meat, carrots, pudding, drank most of juice, some potato. Lactose allergy noted. Pt adm in Oct 2017, per dietitian initial assessment, pt was on pureed diet (transfer papers do not appear in chart at present). Pt appears to tolerate pureed diet/nectar flds at time of visit. Pt confused and was unable to state ht or cbw; said she has noticed her clothing fitting looser when asked (unable to quantify wt loss at present). Per physical assessment, pt c decreased fat/muscle mass (triceps, back, shoulders), possibly related to malnutrition, likely age related. Orbital fat, temporal muscle mass appears appropriate given advanced age. Skin: sacrum stage I, L heel stage I, r heel stage I.

## 2024-07-09 NOTE — GOALS OF CARE CONVERSATION - PERSONAL ADVANCE DIRECTIVE - PHONE #
07/09/24 [id number]   OTHER   Discipline physical therapist   Rehab Time/Intention   Session Not Performed other (see comments)  (Elevated BP hindering participation in PT session this date. Nursing aware/further monitoring.)     Of note, BP in bed 181/101 mmhg. Nursing aware is elevated/ is currently addressing. Will f/u as appropriate.    [phone]

## 2025-01-29 NOTE — DISCHARGE NOTE ADULT - DISCHARGE TO
Detail Level: Simple Price (Do Not Change): 0.00 Instructions: This plan will send the code FBSD to the PM system.  DO NOT or CHANGE the price. Assisted Living

## 2025-04-04 NOTE — ED PROVIDER NOTE - CARDIAC, MLM
Detail Level: Detailed Normal rate, regular rhythm.  Heart sounds S1, S2.  No murmurs, rubs or gallops.  R LE WITH NON PITTING EDEMA.

## 2025-05-15 NOTE — CONSULT NOTE ADULT - PROBLEM SELECTOR RECOMMENDATION 3
Likely initially in the setting of AHRF 2' asthma exacerbation.  - Currently mental status more likely driven by her psych condition. Catatonia treatment as discussed above.  clinically improved with ECT  doing well Await Echo, as physical exam does not suggest critical AS.